# Patient Record
Sex: MALE | Race: BLACK OR AFRICAN AMERICAN | NOT HISPANIC OR LATINO | Employment: OTHER | URBAN - METROPOLITAN AREA
[De-identification: names, ages, dates, MRNs, and addresses within clinical notes are randomized per-mention and may not be internally consistent; named-entity substitution may affect disease eponyms.]

---

## 2018-01-01 ENCOUNTER — HOSPITAL ENCOUNTER (EMERGENCY)
Facility: HOSPITAL | Age: 83
Discharge: HOME | End: 2018-04-05
Attending: EMERGENCY MEDICINE
Payer: MEDICARE

## 2018-01-01 ENCOUNTER — ANESTHESIA EVENT (OUTPATIENT)
Dept: SURGERY | Facility: HOSPITAL | Age: 83
DRG: 335 | End: 2018-01-01
Payer: MEDICARE

## 2018-01-01 ENCOUNTER — PES CALL (OUTPATIENT)
Dept: ADMINISTRATIVE | Facility: CLINIC | Age: 83
End: 2018-01-01

## 2018-01-01 ENCOUNTER — HOSPITAL ENCOUNTER (INPATIENT)
Facility: HOSPITAL | Age: 83
LOS: 16 days | DRG: 335 | End: 2018-05-12
Attending: EMERGENCY MEDICINE | Admitting: INTERNAL MEDICINE
Payer: MEDICARE

## 2018-01-01 ENCOUNTER — HOSPITAL ENCOUNTER (EMERGENCY)
Facility: HOSPITAL | Age: 83
Discharge: HOME | End: 2018-04-22
Attending: EMERGENCY MEDICINE
Payer: MEDICARE

## 2018-01-01 ENCOUNTER — ANESTHESIA (OUTPATIENT)
Dept: SURGERY | Facility: HOSPITAL | Age: 83
DRG: 335 | End: 2018-01-01
Payer: MEDICARE

## 2018-01-01 VITALS
WEIGHT: 180 LBS | OXYGEN SATURATION: 98 % | TEMPERATURE: 98 F | SYSTOLIC BLOOD PRESSURE: 141 MMHG | DIASTOLIC BLOOD PRESSURE: 70 MMHG | BODY MASS INDEX: 24.38 KG/M2 | HEIGHT: 72 IN | RESPIRATION RATE: 20 BRPM | HEART RATE: 58 BPM

## 2018-01-01 VITALS
HEIGHT: 72 IN | DIASTOLIC BLOOD PRESSURE: 55 MMHG | BODY MASS INDEX: 25.06 KG/M2 | OXYGEN SATURATION: 97 % | TEMPERATURE: 98 F | WEIGHT: 185 LBS | HEART RATE: 66 BPM | SYSTOLIC BLOOD PRESSURE: 107 MMHG | RESPIRATION RATE: 18 BRPM

## 2018-01-01 VITALS
WEIGHT: 197.56 LBS | RESPIRATION RATE: 8 BRPM | SYSTOLIC BLOOD PRESSURE: 111 MMHG | DIASTOLIC BLOOD PRESSURE: 61 MMHG | HEART RATE: 26 BPM | BODY MASS INDEX: 26.76 KG/M2 | TEMPERATURE: 96 F | OXYGEN SATURATION: 58 % | HEIGHT: 72 IN

## 2018-01-01 DIAGNOSIS — K56.609 SBO (SMALL BOWEL OBSTRUCTION): ICD-10-CM

## 2018-01-01 DIAGNOSIS — R11.2 NON-INTRACTABLE VOMITING WITH NAUSEA, UNSPECIFIED VOMITING TYPE: Primary | ICD-10-CM

## 2018-01-01 DIAGNOSIS — N39.0 ACUTE UTI: Primary | ICD-10-CM

## 2018-01-01 DIAGNOSIS — K56.7 ILEUS: ICD-10-CM

## 2018-01-01 DIAGNOSIS — R07.9 CHEST PAIN: ICD-10-CM

## 2018-01-01 DIAGNOSIS — K59.00 CONSTIPATION, UNSPECIFIED CONSTIPATION TYPE: Primary | ICD-10-CM

## 2018-01-01 DIAGNOSIS — B18.2 CHRONIC HEPATITIS C WITHOUT HEPATIC COMA: ICD-10-CM

## 2018-01-01 DIAGNOSIS — I25.10 CORONARY ARTERY DISEASE, ANGINA PRESENCE UNSPECIFIED, UNSPECIFIED VESSEL OR LESION TYPE, UNSPECIFIED WHETHER NATIVE OR TRANSPLANTED HEART: ICD-10-CM

## 2018-01-01 DIAGNOSIS — R10.13 EPIGASTRIC PAIN: ICD-10-CM

## 2018-01-01 DIAGNOSIS — E44.0 MALNUTRITION OF MODERATE DEGREE: ICD-10-CM

## 2018-01-01 LAB
ALBUMIN SERPL BCP-MCNC: 1.8 G/DL
ALBUMIN SERPL BCP-MCNC: 1.9 G/DL
ALBUMIN SERPL BCP-MCNC: 1.9 G/DL
ALBUMIN SERPL BCP-MCNC: 2 G/DL
ALBUMIN SERPL BCP-MCNC: 2.1 G/DL
ALBUMIN SERPL BCP-MCNC: 2.2 G/DL
ALBUMIN SERPL BCP-MCNC: 2.4 G/DL
ALBUMIN SERPL BCP-MCNC: 2.5 G/DL
ALBUMIN SERPL BCP-MCNC: 2.8 G/DL
ALBUMIN SERPL BCP-MCNC: 2.9 G/DL
ALBUMIN SERPL BCP-MCNC: 3.1 G/DL
ALLENS TEST: ABNORMAL
ALP SERPL-CCNC: 101 U/L
ALP SERPL-CCNC: 49 U/L
ALP SERPL-CCNC: 49 U/L
ALP SERPL-CCNC: 51 U/L
ALP SERPL-CCNC: 53 U/L
ALP SERPL-CCNC: 55 U/L
ALP SERPL-CCNC: 56 U/L
ALP SERPL-CCNC: 56 U/L
ALP SERPL-CCNC: 58 U/L
ALP SERPL-CCNC: 61 U/L
ALP SERPL-CCNC: 63 U/L
ALP SERPL-CCNC: 69 U/L
ALP SERPL-CCNC: 71 U/L
ALP SERPL-CCNC: 82 U/L
ALP SERPL-CCNC: 85 U/L
ALP SERPL-CCNC: 92 U/L
ALP SERPL-CCNC: 94 U/L
ALT SERPL W/O P-5'-P-CCNC: 18 U/L
ALT SERPL W/O P-5'-P-CCNC: 20 U/L
ALT SERPL W/O P-5'-P-CCNC: 20 U/L
ALT SERPL W/O P-5'-P-CCNC: 21 U/L
ALT SERPL W/O P-5'-P-CCNC: 22 U/L
ALT SERPL W/O P-5'-P-CCNC: 22 U/L
ALT SERPL W/O P-5'-P-CCNC: 24 U/L
ALT SERPL W/O P-5'-P-CCNC: 24 U/L
ALT SERPL W/O P-5'-P-CCNC: 26 U/L
ALT SERPL W/O P-5'-P-CCNC: 27 U/L
ALT SERPL W/O P-5'-P-CCNC: 29 U/L
ALT SERPL W/O P-5'-P-CCNC: 29 U/L
ALT SERPL W/O P-5'-P-CCNC: 32 U/L
ALT SERPL W/O P-5'-P-CCNC: 34 U/L
ALT SERPL W/O P-5'-P-CCNC: 39 U/L
ALT SERPL W/O P-5'-P-CCNC: 40 U/L
ALT SERPL W/O P-5'-P-CCNC: 58 U/L
ALT SERPL W/O P-5'-P-CCNC: 66 U/L
ALT SERPL W/O P-5'-P-CCNC: 75 U/L
ANION GAP SERPL CALC-SCNC: 10 MMOL/L
ANION GAP SERPL CALC-SCNC: 10 MMOL/L
ANION GAP SERPL CALC-SCNC: 4 MMOL/L
ANION GAP SERPL CALC-SCNC: 5 MMOL/L
ANION GAP SERPL CALC-SCNC: 5 MMOL/L
ANION GAP SERPL CALC-SCNC: 6 MMOL/L
ANION GAP SERPL CALC-SCNC: 7 MMOL/L
ANION GAP SERPL CALC-SCNC: 9 MMOL/L
AST SERPL-CCNC: 27 U/L
AST SERPL-CCNC: 27 U/L
AST SERPL-CCNC: 29 U/L
AST SERPL-CCNC: 30 U/L
AST SERPL-CCNC: 31 U/L
AST SERPL-CCNC: 32 U/L
AST SERPL-CCNC: 32 U/L
AST SERPL-CCNC: 33 U/L
AST SERPL-CCNC: 34 U/L
AST SERPL-CCNC: 38 U/L
AST SERPL-CCNC: 41 U/L
AST SERPL-CCNC: 46 U/L
AST SERPL-CCNC: 48 U/L
AST SERPL-CCNC: 49 U/L
AST SERPL-CCNC: 49 U/L
AST SERPL-CCNC: 80 U/L
AST SERPL-CCNC: 82 U/L
BACTERIA #/AREA URNS HPF: ABNORMAL /HPF
BACTERIA #/AREA URNS HPF: ABNORMAL /HPF
BACTERIA BLD CULT: NORMAL
BACTERIA BLD CULT: NORMAL
BACTERIA SPEC AEROBE CULT: NORMAL
BACTERIA UR CULT: NO GROWTH
BACTERIA UR CULT: NORMAL
BACTERIA UR CULT: NORMAL
BASOPHILS # BLD AUTO: 0 K/UL
BASOPHILS # BLD AUTO: 0.1 K/UL
BASOPHILS # BLD AUTO: ABNORMAL K/UL
BASOPHILS NFR BLD: 0 %
BASOPHILS NFR BLD: 0.1 %
BASOPHILS NFR BLD: 0.2 %
BASOPHILS NFR BLD: 0.2 %
BASOPHILS NFR BLD: 0.3 %
BASOPHILS NFR BLD: 0.6 %
BASOPHILS NFR BLD: 0.6 %
BILIRUB SERPL-MCNC: 0.7 MG/DL
BILIRUB SERPL-MCNC: 0.8 MG/DL
BILIRUB SERPL-MCNC: 0.8 MG/DL
BILIRUB SERPL-MCNC: 0.9 MG/DL
BILIRUB SERPL-MCNC: 1 MG/DL
BILIRUB SERPL-MCNC: 1.1 MG/DL
BILIRUB SERPL-MCNC: 1.1 MG/DL
BILIRUB SERPL-MCNC: 1.2 MG/DL
BILIRUB SERPL-MCNC: 1.3 MG/DL
BILIRUB SERPL-MCNC: 1.6 MG/DL
BILIRUB UR QL STRIP: NEGATIVE
BNP SERPL-MCNC: 231 PG/ML
BUN SERPL-MCNC: 10 MG/DL
BUN SERPL-MCNC: 11 MG/DL
BUN SERPL-MCNC: 11 MG/DL
BUN SERPL-MCNC: 12 MG/DL
BUN SERPL-MCNC: 13 MG/DL
BUN SERPL-MCNC: 14 MG/DL
BUN SERPL-MCNC: 14 MG/DL
BUN SERPL-MCNC: 15 MG/DL
BUN SERPL-MCNC: 16 MG/DL
BUN SERPL-MCNC: 16 MG/DL
BUN SERPL-MCNC: 17 MG/DL
BUN SERPL-MCNC: 18 MG/DL
BUN SERPL-MCNC: 21 MG/DL
CALCIUM SERPL-MCNC: 7.8 MG/DL
CALCIUM SERPL-MCNC: 7.8 MG/DL
CALCIUM SERPL-MCNC: 7.9 MG/DL
CALCIUM SERPL-MCNC: 8 MG/DL
CALCIUM SERPL-MCNC: 8 MG/DL
CALCIUM SERPL-MCNC: 8.1 MG/DL
CALCIUM SERPL-MCNC: 8.2 MG/DL
CALCIUM SERPL-MCNC: 8.2 MG/DL
CALCIUM SERPL-MCNC: 8.3 MG/DL
CALCIUM SERPL-MCNC: 8.3 MG/DL
CALCIUM SERPL-MCNC: 8.4 MG/DL
CALCIUM SERPL-MCNC: 8.4 MG/DL
CALCIUM SERPL-MCNC: 8.9 MG/DL
CALCIUM SERPL-MCNC: 9 MG/DL
CALCIUM SERPL-MCNC: 9 MG/DL
CHLORIDE SERPL-SCNC: 100 MMOL/L
CHLORIDE SERPL-SCNC: 101 MMOL/L
CHLORIDE SERPL-SCNC: 102 MMOL/L
CHLORIDE SERPL-SCNC: 103 MMOL/L
CHLORIDE SERPL-SCNC: 107 MMOL/L
CHLORIDE SERPL-SCNC: 95 MMOL/L
CHLORIDE SERPL-SCNC: 95 MMOL/L
CHLORIDE SERPL-SCNC: 96 MMOL/L
CHLORIDE SERPL-SCNC: 97 MMOL/L
CHLORIDE SERPL-SCNC: 98 MMOL/L
CHLORIDE SERPL-SCNC: 98 MMOL/L
CHLORIDE SERPL-SCNC: 99 MMOL/L
CHLORIDE SERPL-SCNC: 99 MMOL/L
CLARITY UR: ABNORMAL
CLARITY UR: ABNORMAL
CLARITY UR: CLEAR
CO2 SERPL-SCNC: 21 MMOL/L
CO2 SERPL-SCNC: 21 MMOL/L
CO2 SERPL-SCNC: 23 MMOL/L
CO2 SERPL-SCNC: 24 MMOL/L
CO2 SERPL-SCNC: 25 MMOL/L
CO2 SERPL-SCNC: 25 MMOL/L
CO2 SERPL-SCNC: 26 MMOL/L
CO2 SERPL-SCNC: 27 MMOL/L
CO2 SERPL-SCNC: 27 MMOL/L
CO2 SERPL-SCNC: 28 MMOL/L
CO2 SERPL-SCNC: 29 MMOL/L
CO2 SERPL-SCNC: 30 MMOL/L
CO2 SERPL-SCNC: 32 MMOL/L
COLOR UR: YELLOW
CREAT SERPL-MCNC: 0.6 MG/DL
CREAT SERPL-MCNC: 0.7 MG/DL
CREAT SERPL-MCNC: 0.8 MG/DL
CREAT SERPL-MCNC: 0.9 MG/DL
DELSYS: ABNORMAL
DIFFERENTIAL METHOD: ABNORMAL
EOSINOPHIL # BLD AUTO: 0 K/UL
EOSINOPHIL # BLD AUTO: 0.1 K/UL
EOSINOPHIL # BLD AUTO: 0.2 K/UL
EOSINOPHIL # BLD AUTO: 0.3 K/UL
EOSINOPHIL # BLD AUTO: 0.3 K/UL
EOSINOPHIL # BLD AUTO: ABNORMAL K/UL
EOSINOPHIL NFR BLD: 0 %
EOSINOPHIL NFR BLD: 0.2 %
EOSINOPHIL NFR BLD: 0.2 %
EOSINOPHIL NFR BLD: 0.3 %
EOSINOPHIL NFR BLD: 0.4 %
EOSINOPHIL NFR BLD: 1 %
EOSINOPHIL NFR BLD: 1.8 %
EOSINOPHIL NFR BLD: 2.1 %
EOSINOPHIL NFR BLD: 2.3 %
EOSINOPHIL NFR BLD: 2.4 %
EOSINOPHIL NFR BLD: 2.9 %
EOSINOPHIL NFR BLD: 3 %
EOSINOPHIL NFR BLD: 3.2 %
EOSINOPHIL NFR BLD: 5 %
EP: 12
EP: 6
EP: 6
ERYTHROCYTE [DISTWIDTH] IN BLOOD BY AUTOMATED COUNT: 12.8 %
ERYTHROCYTE [DISTWIDTH] IN BLOOD BY AUTOMATED COUNT: 13 %
ERYTHROCYTE [DISTWIDTH] IN BLOOD BY AUTOMATED COUNT: 13 %
ERYTHROCYTE [DISTWIDTH] IN BLOOD BY AUTOMATED COUNT: 13.1 %
ERYTHROCYTE [DISTWIDTH] IN BLOOD BY AUTOMATED COUNT: 13.2 %
ERYTHROCYTE [DISTWIDTH] IN BLOOD BY AUTOMATED COUNT: 13.3 %
ERYTHROCYTE [DISTWIDTH] IN BLOOD BY AUTOMATED COUNT: 13.6 %
ERYTHROCYTE [DISTWIDTH] IN BLOOD BY AUTOMATED COUNT: 13.7 %
ERYTHROCYTE [DISTWIDTH] IN BLOOD BY AUTOMATED COUNT: 14.2 %
ERYTHROCYTE [SEDIMENTATION RATE] IN BLOOD BY WESTERGREN METHOD: 10 MM/H
ERYTHROCYTE [SEDIMENTATION RATE] IN BLOOD BY WESTERGREN METHOD: 16 MM/H
ERYTHROCYTE [SEDIMENTATION RATE] IN BLOOD BY WESTERGREN METHOD: 20 MM/H
ERYTHROCYTE [SEDIMENTATION RATE] IN BLOOD BY WESTERGREN METHOD: 20 MM/H
ERYTHROCYTE [SEDIMENTATION RATE] IN BLOOD BY WESTERGREN METHOD: 26 MM/H
ERYTHROCYTE [SEDIMENTATION RATE] IN BLOOD BY WESTERGREN METHOD: 30 MM/H
ERYTHROCYTE [SEDIMENTATION RATE] IN BLOOD BY WESTERGREN METHOD: 30 MM/H
EST. GFR  (AFRICAN AMERICAN): >60 ML/MIN/1.73 M^2
EST. GFR  (NON AFRICAN AMERICAN): >60 ML/MIN/1.73 M^2
ETCO2: 24
ETCO2: 26
ETCO2: 29
ETCO2: 32
FIO2: 0.7
FIO2: 1
FIO2: 1
FIO2: 40
FIO2: 50
FIO2: 60
FIO2: 70
FIO2: 90
FIO2: 90
FLOW: 12
GLUCOSE SERPL-MCNC: 104 MG/DL
GLUCOSE SERPL-MCNC: 110 MG/DL
GLUCOSE SERPL-MCNC: 111 MG/DL
GLUCOSE SERPL-MCNC: 113 MG/DL
GLUCOSE SERPL-MCNC: 113 MG/DL
GLUCOSE SERPL-MCNC: 114 MG/DL
GLUCOSE SERPL-MCNC: 117 MG/DL
GLUCOSE SERPL-MCNC: 119 MG/DL
GLUCOSE SERPL-MCNC: 119 MG/DL
GLUCOSE SERPL-MCNC: 121 MG/DL
GLUCOSE SERPL-MCNC: 122 MG/DL
GLUCOSE SERPL-MCNC: 124 MG/DL
GLUCOSE SERPL-MCNC: 127 MG/DL
GLUCOSE SERPL-MCNC: 129 MG/DL
GLUCOSE SERPL-MCNC: 141 MG/DL
GLUCOSE SERPL-MCNC: 84 MG/DL
GLUCOSE SERPL-MCNC: 88 MG/DL
GLUCOSE SERPL-MCNC: 91 MG/DL
GLUCOSE SERPL-MCNC: 96 MG/DL
GLUCOSE UR QL STRIP: NEGATIVE
GRAM STN SPEC: NORMAL
HCO3 UR-SCNC: 24.6 MMOL/L (ref 24–28)
HCO3 UR-SCNC: 25.6 MMOL/L (ref 24–28)
HCO3 UR-SCNC: 26.7 MMOL/L (ref 24–28)
HCO3 UR-SCNC: 27.6 MMOL/L (ref 24–28)
HCO3 UR-SCNC: 27.6 MMOL/L (ref 24–28)
HCO3 UR-SCNC: 27.7 MMOL/L (ref 24–28)
HCO3 UR-SCNC: 27.9 MMOL/L (ref 24–28)
HCO3 UR-SCNC: 28.7 MMOL/L (ref 24–28)
HCO3 UR-SCNC: 29.5 MMOL/L (ref 24–28)
HCO3 UR-SCNC: 30 MMOL/L (ref 24–28)
HCO3 UR-SCNC: 30.1 MMOL/L (ref 24–28)
HCO3 UR-SCNC: 30.2 MMOL/L (ref 24–28)
HCO3 UR-SCNC: 31.9 MMOL/L (ref 24–28)
HCO3 UR-SCNC: 32.1 MMOL/L (ref 24–28)
HCT VFR BLD AUTO: 29.4 %
HCT VFR BLD AUTO: 29.8 %
HCT VFR BLD AUTO: 30.6 %
HCT VFR BLD AUTO: 30.7 %
HCT VFR BLD AUTO: 30.9 %
HCT VFR BLD AUTO: 31.3 %
HCT VFR BLD AUTO: 32.8 %
HCT VFR BLD AUTO: 33.7 %
HCT VFR BLD AUTO: 34.1 %
HCT VFR BLD AUTO: 34.7 %
HCT VFR BLD AUTO: 34.7 %
HCT VFR BLD AUTO: 35.1 %
HCT VFR BLD AUTO: 35.6 %
HCT VFR BLD AUTO: 35.7 %
HCT VFR BLD AUTO: 37.3 %
HCT VFR BLD AUTO: 37.7 %
HCT VFR BLD AUTO: 37.9 %
HCT VFR BLD AUTO: 39.5 %
HCT VFR BLD AUTO: 42.4 %
HEPARIN INDUCED THROMBOCYTOPENIA: NORMAL
HGB BLD-MCNC: 10 G/DL
HGB BLD-MCNC: 10 G/DL
HGB BLD-MCNC: 10.1 G/DL
HGB BLD-MCNC: 10.2 G/DL
HGB BLD-MCNC: 11 G/DL
HGB BLD-MCNC: 11.2 G/DL
HGB BLD-MCNC: 11.3 G/DL
HGB BLD-MCNC: 11.5 G/DL
HGB BLD-MCNC: 11.6 G/DL
HGB BLD-MCNC: 11.7 G/DL
HGB BLD-MCNC: 11.8 G/DL
HGB BLD-MCNC: 12.1 G/DL
HGB BLD-MCNC: 12.2 G/DL
HGB BLD-MCNC: 12.6 G/DL
HGB BLD-MCNC: 12.6 G/DL
HGB BLD-MCNC: 13 G/DL
HGB BLD-MCNC: 14 G/DL
HGB BLD-MCNC: 9.8 G/DL
HGB BLD-MCNC: 9.9 G/DL
HGB UR QL STRIP: ABNORMAL
IP: 12
IP: 12
IP: 18
KETONES UR QL STRIP: ABNORMAL
KETONES UR QL STRIP: NEGATIVE
KETONES UR QL STRIP: NEGATIVE
LACTATE SERPL-SCNC: 0.9 MMOL/L
LACTATE SERPL-SCNC: 1 MMOL/L
LACTATE SERPL-SCNC: 2.5 MMOL/L
LEUKOCYTE ESTERASE UR QL STRIP: ABNORMAL
LEUKOCYTE ESTERASE UR QL STRIP: ABNORMAL
LEUKOCYTE ESTERASE UR QL STRIP: NEGATIVE
LIPASE SERPL-CCNC: 43 U/L
LIPASE SERPL-CCNC: 72 U/L
LYMPHOCYTES # BLD AUTO: 0.9 K/UL
LYMPHOCYTES # BLD AUTO: 1.1 K/UL
LYMPHOCYTES # BLD AUTO: 1.2 K/UL
LYMPHOCYTES # BLD AUTO: 1.3 K/UL
LYMPHOCYTES # BLD AUTO: 1.4 K/UL
LYMPHOCYTES # BLD AUTO: 1.6 K/UL
LYMPHOCYTES # BLD AUTO: 1.7 K/UL
LYMPHOCYTES # BLD AUTO: 1.7 K/UL
LYMPHOCYTES # BLD AUTO: 1.9 K/UL
LYMPHOCYTES # BLD AUTO: 2 K/UL
LYMPHOCYTES # BLD AUTO: 2 K/UL
LYMPHOCYTES # BLD AUTO: 2.1 K/UL
LYMPHOCYTES # BLD AUTO: 2.5 K/UL
LYMPHOCYTES # BLD AUTO: 2.8 K/UL
LYMPHOCYTES # BLD AUTO: ABNORMAL K/UL
LYMPHOCYTES NFR BLD: 10.5 %
LYMPHOCYTES NFR BLD: 11 %
LYMPHOCYTES NFR BLD: 12.8 %
LYMPHOCYTES NFR BLD: 15.3 %
LYMPHOCYTES NFR BLD: 15.5 %
LYMPHOCYTES NFR BLD: 16.4 %
LYMPHOCYTES NFR BLD: 16.8 %
LYMPHOCYTES NFR BLD: 18 %
LYMPHOCYTES NFR BLD: 18.6 %
LYMPHOCYTES NFR BLD: 21.3 %
LYMPHOCYTES NFR BLD: 32.2 %
LYMPHOCYTES NFR BLD: 38 %
LYMPHOCYTES NFR BLD: 5 %
LYMPHOCYTES NFR BLD: 5.1 %
LYMPHOCYTES NFR BLD: 5.1 %
LYMPHOCYTES NFR BLD: 5.2 %
LYMPHOCYTES NFR BLD: 6 %
LYMPHOCYTES NFR BLD: 7 %
LYMPHOCYTES NFR BLD: 9.2 %
MAGNESIUM SERPL-MCNC: 1.6 MG/DL
MAGNESIUM SERPL-MCNC: 1.7 MG/DL
MAGNESIUM SERPL-MCNC: 1.8 MG/DL
MAGNESIUM SERPL-MCNC: 1.9 MG/DL
MAGNESIUM SERPL-MCNC: 2 MG/DL
MAGNESIUM SERPL-MCNC: 2.1 MG/DL
MAP: 8
MCH RBC QN AUTO: 30.6 PG
MCH RBC QN AUTO: 30.8 PG
MCH RBC QN AUTO: 30.9 PG
MCH RBC QN AUTO: 31.1 PG
MCH RBC QN AUTO: 31.2 PG
MCH RBC QN AUTO: 31.3 PG
MCH RBC QN AUTO: 31.4 PG
MCH RBC QN AUTO: 31.4 PG
MCH RBC QN AUTO: 31.5 PG
MCH RBC QN AUTO: 31.5 PG
MCH RBC QN AUTO: 31.6 PG
MCH RBC QN AUTO: 31.7 PG
MCH RBC QN AUTO: 32 PG
MCHC RBC AUTO-ENTMCNC: 32.2 G/DL
MCHC RBC AUTO-ENTMCNC: 32.4 G/DL
MCHC RBC AUTO-ENTMCNC: 32.6 G/DL
MCHC RBC AUTO-ENTMCNC: 32.7 G/DL
MCHC RBC AUTO-ENTMCNC: 32.8 G/DL
MCHC RBC AUTO-ENTMCNC: 32.9 G/DL
MCHC RBC AUTO-ENTMCNC: 33 G/DL
MCHC RBC AUTO-ENTMCNC: 33 G/DL
MCHC RBC AUTO-ENTMCNC: 33.1 G/DL
MCHC RBC AUTO-ENTMCNC: 33.2 G/DL
MCHC RBC AUTO-ENTMCNC: 33.2 G/DL
MCHC RBC AUTO-ENTMCNC: 33.3 G/DL
MCHC RBC AUTO-ENTMCNC: 33.3 G/DL
MCHC RBC AUTO-ENTMCNC: 33.4 G/DL
MCHC RBC AUTO-ENTMCNC: 33.4 G/DL
MCHC RBC AUTO-ENTMCNC: 33.5 G/DL
MCHC RBC AUTO-ENTMCNC: 33.6 G/DL
MCHC RBC AUTO-ENTMCNC: 33.8 G/DL
MCHC RBC AUTO-ENTMCNC: 33.9 G/DL
MCV RBC AUTO: 93 FL
MCV RBC AUTO: 94 FL
MCV RBC AUTO: 94 FL
MCV RBC AUTO: 95 FL
MCV RBC AUTO: 96 FL
MCV RBC AUTO: 96 FL
METAMYELOCYTES NFR BLD MANUAL: 2 %
MICROSCOPIC COMMENT: ABNORMAL
MICROSCOPIC COMMENT: ABNORMAL
MIN VOL: 12
MIN VOL: 13
MIN VOL: 14
MIN VOL: 14
MIN VOL: 14.8
MIN VOL: 7
MIN VOL: 9.8
MODE: ABNORMAL
MONOCYTES # BLD AUTO: 0.4 K/UL
MONOCYTES # BLD AUTO: 0.5 K/UL
MONOCYTES # BLD AUTO: 0.6 K/UL
MONOCYTES # BLD AUTO: 0.6 K/UL
MONOCYTES # BLD AUTO: 0.7 K/UL
MONOCYTES # BLD AUTO: 0.7 K/UL
MONOCYTES # BLD AUTO: 0.8 K/UL
MONOCYTES # BLD AUTO: 0.8 K/UL
MONOCYTES # BLD AUTO: 0.9 K/UL
MONOCYTES # BLD AUTO: 0.9 K/UL
MONOCYTES # BLD AUTO: 1.2 K/UL
MONOCYTES # BLD AUTO: 1.2 K/UL
MONOCYTES # BLD AUTO: 1.3 K/UL
MONOCYTES # BLD AUTO: 1.4 K/UL
MONOCYTES # BLD AUTO: ABNORMAL K/UL
MONOCYTES NFR BLD: 10.2 %
MONOCYTES NFR BLD: 10.9 %
MONOCYTES NFR BLD: 2 %
MONOCYTES NFR BLD: 2.8 %
MONOCYTES NFR BLD: 3.3 %
MONOCYTES NFR BLD: 4.5 %
MONOCYTES NFR BLD: 5 %
MONOCYTES NFR BLD: 5 %
MONOCYTES NFR BLD: 5.6 %
MONOCYTES NFR BLD: 6 %
MONOCYTES NFR BLD: 6 %
MONOCYTES NFR BLD: 6.6 %
MONOCYTES NFR BLD: 6.8 %
MONOCYTES NFR BLD: 7 %
MONOCYTES NFR BLD: 7 %
MONOCYTES NFR BLD: 7.1 %
MONOCYTES NFR BLD: 9.1 %
MONOCYTES NFR BLD: 9.4 %
MONOCYTES NFR BLD: 9.4 %
NEUTROPHILS # BLD AUTO: 11.4 K/UL
NEUTROPHILS # BLD AUTO: 11.7 K/UL
NEUTROPHILS # BLD AUTO: 13.7 K/UL
NEUTROPHILS # BLD AUTO: 15.1 K/UL
NEUTROPHILS # BLD AUTO: 18.6 K/UL
NEUTROPHILS # BLD AUTO: 21.6 K/UL
NEUTROPHILS # BLD AUTO: 3.2 K/UL
NEUTROPHILS # BLD AUTO: 3.5 K/UL
NEUTROPHILS # BLD AUTO: 6.7 K/UL
NEUTROPHILS # BLD AUTO: 6.8 K/UL
NEUTROPHILS # BLD AUTO: 7.5 K/UL
NEUTROPHILS # BLD AUTO: 8.3 K/UL
NEUTROPHILS # BLD AUTO: 9.4 K/UL
NEUTROPHILS # BLD AUTO: 9.7 K/UL
NEUTROPHILS NFR BLD: 47.6 %
NEUTROPHILS NFR BLD: 55.5 %
NEUTROPHILS NFR BLD: 70.3 %
NEUTROPHILS NFR BLD: 73.2 %
NEUTROPHILS NFR BLD: 74.8 %
NEUTROPHILS NFR BLD: 75 %
NEUTROPHILS NFR BLD: 75 %
NEUTROPHILS NFR BLD: 75.2 %
NEUTROPHILS NFR BLD: 76 %
NEUTROPHILS NFR BLD: 77.7 %
NEUTROPHILS NFR BLD: 78.2 %
NEUTROPHILS NFR BLD: 79 %
NEUTROPHILS NFR BLD: 81.1 %
NEUTROPHILS NFR BLD: 82 %
NEUTROPHILS NFR BLD: 82.8 %
NEUTROPHILS NFR BLD: 87 %
NEUTROPHILS NFR BLD: 88 %
NEUTROPHILS NFR BLD: 88 %
NEUTROPHILS NFR BLD: 90.9 %
NEUTS BAND NFR BLD MANUAL: 1 %
NEUTS BAND NFR BLD MANUAL: 14 %
NEUTS BAND NFR BLD MANUAL: 3 %
NITRITE UR QL STRIP: NEGATIVE
PCO2 BLDA: 41.4 MMHG (ref 35–45)
PCO2 BLDA: 45.3 MMHG (ref 35–45)
PCO2 BLDA: 45.6 MMHG (ref 35–45)
PCO2 BLDA: 46.4 MMHG (ref 35–45)
PCO2 BLDA: 46.5 MMHG (ref 35–45)
PCO2 BLDA: 48.8 MMHG (ref 35–45)
PCO2 BLDA: 49.4 MMHG (ref 35–45)
PCO2 BLDA: 49.6 MMHG (ref 35–45)
PCO2 BLDA: 49.9 MMHG (ref 35–45)
PCO2 BLDA: 52.6 MMHG (ref 35–45)
PCO2 BLDA: 53.1 MMHG (ref 35–45)
PCO2 BLDA: 53.4 MMHG (ref 35–45)
PCO2 BLDA: 55.4 MMHG (ref 35–45)
PCO2 BLDA: 59.3 MMHG (ref 35–45)
PEEP: 10
PEEP: 12
PEEP: 12
PEEP: 15
PEEP: 5
PH SMN: 7.28 [PH] (ref 7.35–7.45)
PH SMN: 7.31 [PH] (ref 7.35–7.45)
PH SMN: 7.32 [PH] (ref 7.35–7.45)
PH SMN: 7.35 [PH] (ref 7.35–7.45)
PH SMN: 7.35 [PH] (ref 7.35–7.45)
PH SMN: 7.36 [PH] (ref 7.35–7.45)
PH SMN: 7.38 [PH] (ref 7.35–7.45)
PH SMN: 7.38 [PH] (ref 7.35–7.45)
PH SMN: 7.39 [PH] (ref 7.35–7.45)
PH SMN: 7.39 [PH] (ref 7.35–7.45)
PH SMN: 7.4 [PH] (ref 7.35–7.45)
PH SMN: 7.41 [PH] (ref 7.35–7.45)
PH SMN: 7.42 [PH] (ref 7.35–7.45)
PH SMN: 7.42 [PH] (ref 7.35–7.45)
PH UR STRIP: 7 [PH] (ref 5–8)
PH UR STRIP: 7 [PH] (ref 5–8)
PH UR STRIP: 8 [PH] (ref 5–8)
PHOSPHATE SERPL-MCNC: 2.4 MG/DL
PHOSPHATE SERPL-MCNC: 2.6 MG/DL
PHOSPHATE SERPL-MCNC: 2.7 MG/DL
PHOSPHATE SERPL-MCNC: 2.7 MG/DL
PHOSPHATE SERPL-MCNC: 2.9 MG/DL
PHOSPHATE SERPL-MCNC: 3 MG/DL
PHOSPHATE SERPL-MCNC: 3.1 MG/DL
PHOSPHATE SERPL-MCNC: 3.1 MG/DL
PHOSPHATE SERPL-MCNC: 3.2 MG/DL
PHOSPHATE SERPL-MCNC: 3.2 MG/DL
PHOSPHATE SERPL-MCNC: 3.6 MG/DL
PIP: 26
PIP: 30
PIP: 30
PIP: 32
PIP: 33
PLATELET # BLD AUTO: 105 K/UL
PLATELET # BLD AUTO: 146 K/UL
PLATELET # BLD AUTO: 16 K/UL
PLATELET # BLD AUTO: 161 K/UL
PLATELET # BLD AUTO: 165 K/UL
PLATELET # BLD AUTO: 166 K/UL
PLATELET # BLD AUTO: 169 K/UL
PLATELET # BLD AUTO: 171 K/UL
PLATELET # BLD AUTO: 173 K/UL
PLATELET # BLD AUTO: 173 K/UL
PLATELET # BLD AUTO: 175 K/UL
PLATELET # BLD AUTO: 180 K/UL
PLATELET # BLD AUTO: 186 K/UL
PLATELET # BLD AUTO: 189 K/UL
PLATELET # BLD AUTO: 190 K/UL
PLATELET # BLD AUTO: 194 K/UL
PLATELET # BLD AUTO: 195 K/UL
PLATELET # BLD AUTO: 32 K/UL
PLATELET # BLD AUTO: 40 K/UL
PLATELET BLD QL SMEAR: ABNORMAL
PMV BLD AUTO: 8.4 FL
PMV BLD AUTO: 8.4 FL
PMV BLD AUTO: 8.6 FL
PMV BLD AUTO: 8.6 FL
PMV BLD AUTO: 8.9 FL
PMV BLD AUTO: 9 FL
PMV BLD AUTO: 9.1 FL
PMV BLD AUTO: 9.3 FL
PMV BLD AUTO: 9.3 FL
PMV BLD AUTO: 9.4 FL
PMV BLD AUTO: 9.4 FL
PMV BLD AUTO: 9.5 FL
PMV BLD AUTO: 9.6 FL
PMV BLD AUTO: 9.8 FL
PMV BLD AUTO: 9.8 FL
PO2 BLDA: 107 MMHG (ref 80–100)
PO2 BLDA: 113 MMHG (ref 80–100)
PO2 BLDA: 143 MMHG (ref 80–100)
PO2 BLDA: 159 MMHG (ref 80–100)
PO2 BLDA: 171 MMHG (ref 80–100)
PO2 BLDA: 59 MMHG (ref 80–100)
PO2 BLDA: 60 MMHG (ref 80–100)
PO2 BLDA: 60 MMHG (ref 80–100)
PO2 BLDA: 67 MMHG (ref 80–100)
PO2 BLDA: 79 MMHG (ref 80–100)
PO2 BLDA: 90 MMHG (ref 80–100)
PO2 BLDA: 92 MMHG (ref 80–100)
PO2 BLDA: 95 MMHG (ref 80–100)
PO2 BLDA: 99 MMHG (ref 80–100)
POC BE: 0 MMOL/L
POC BE: 0 MMOL/L
POC BE: 1 MMOL/L
POC BE: 2 MMOL/L
POC BE: 3 MMOL/L
POC BE: 4 MMOL/L
POC BE: 4 MMOL/L
POC BE: 5 MMOL/L
POC BE: 5 MMOL/L
POC BE: 6 MMOL/L
POC BE: 7 MMOL/L
POC BE: 7 MMOL/L
POC PCO2 TEMP: 44.8 MMHG
POC PH TEMP: 7.43
POC PO2 TEMP: 147 MMHG
POC SATURATED O2: 86 % (ref 95–100)
POC SATURATED O2: 90 % (ref 95–100)
POC SATURATED O2: 91 % (ref 95–100)
POC SATURATED O2: 92 % (ref 95–100)
POC SATURATED O2: 94 % (ref 95–100)
POC SATURATED O2: 97 % (ref 95–100)
POC SATURATED O2: 98 % (ref 95–100)
POC SATURATED O2: 99 % (ref 95–100)
POC TCO2: 26 MMOL/L (ref 23–27)
POC TCO2: 27 MMOL/L (ref 23–27)
POC TCO2: 28 MMOL/L (ref 23–27)
POC TCO2: 29 MMOL/L (ref 23–27)
POC TCO2: 30 MMOL/L (ref 23–27)
POC TCO2: 31 MMOL/L (ref 23–27)
POC TCO2: 31 MMOL/L (ref 23–27)
POC TCO2: 32 MMOL/L (ref 23–27)
POC TCO2: 32 MMOL/L (ref 23–27)
POC TCO2: 33 MMOL/L (ref 23–27)
POC TCO2: 34 MMOL/L (ref 23–27)
POC TEMPERATURE: ABNORMAL
POCT GLUCOSE: 122 MG/DL (ref 70–110)
POLYCHROMASIA BLD QL SMEAR: ABNORMAL
POLYCHROMASIA BLD QL SMEAR: ABNORMAL
POTASSIUM SERPL-SCNC: 3.5 MMOL/L
POTASSIUM SERPL-SCNC: 3.7 MMOL/L
POTASSIUM SERPL-SCNC: 3.8 MMOL/L
POTASSIUM SERPL-SCNC: 3.9 MMOL/L
POTASSIUM SERPL-SCNC: 3.9 MMOL/L
POTASSIUM SERPL-SCNC: 4 MMOL/L
POTASSIUM SERPL-SCNC: 4 MMOL/L
POTASSIUM SERPL-SCNC: 4.1 MMOL/L
POTASSIUM SERPL-SCNC: 4.3 MMOL/L
POTASSIUM SERPL-SCNC: 4.4 MMOL/L
POTASSIUM SERPL-SCNC: 4.7 MMOL/L
POTASSIUM SERPL-SCNC: 4.7 MMOL/L
PROCALCITONIN SERPL IA-MCNC: 0.13 NG/ML
PROT SERPL-MCNC: 4.7 G/DL
PROT SERPL-MCNC: 4.7 G/DL
PROT SERPL-MCNC: 4.8 G/DL
PROT SERPL-MCNC: 4.9 G/DL
PROT SERPL-MCNC: 4.9 G/DL
PROT SERPL-MCNC: 5 G/DL
PROT SERPL-MCNC: 5 G/DL
PROT SERPL-MCNC: 5.1 G/DL
PROT SERPL-MCNC: 5.3 G/DL
PROT SERPL-MCNC: 5.3 G/DL
PROT SERPL-MCNC: 5.4 G/DL
PROT SERPL-MCNC: 5.5 G/DL
PROT SERPL-MCNC: 5.6 G/DL
PROT SERPL-MCNC: 5.9 G/DL
PROT SERPL-MCNC: 6.7 G/DL
PROT SERPL-MCNC: 6.8 G/DL
PROT SERPL-MCNC: 6.8 G/DL
PROT UR QL STRIP: ABNORMAL
PROT UR QL STRIP: NEGATIVE
PROT UR QL STRIP: NEGATIVE
PS: 10
RBC # BLD AUTO: 3.14 M/UL
RBC # BLD AUTO: 3.19 M/UL
RBC # BLD AUTO: 3.22 M/UL
RBC # BLD AUTO: 3.27 M/UL
RBC # BLD AUTO: 3.28 M/UL
RBC # BLD AUTO: 3.29 M/UL
RBC # BLD AUTO: 3.52 M/UL
RBC # BLD AUTO: 3.56 M/UL
RBC # BLD AUTO: 3.58 M/UL
RBC # BLD AUTO: 3.66 M/UL
RBC # BLD AUTO: 3.7 M/UL
RBC # BLD AUTO: 3.71 M/UL
RBC # BLD AUTO: 3.74 M/UL
RBC # BLD AUTO: 3.77 M/UL
RBC # BLD AUTO: 3.87 M/UL
RBC # BLD AUTO: 3.98 M/UL
RBC # BLD AUTO: 4.03 M/UL
RBC # BLD AUTO: 4.18 M/UL
RBC # BLD AUTO: 4.49 M/UL
RBC #/AREA URNS HPF: 18 /HPF (ref 0–4)
RBC #/AREA URNS HPF: >100 /HPF (ref 0–4)
SAMPLE: ABNORMAL
SITE: ABNORMAL
SODIUM SERPL-SCNC: 126 MMOL/L
SODIUM SERPL-SCNC: 127 MMOL/L
SODIUM SERPL-SCNC: 129 MMOL/L
SODIUM SERPL-SCNC: 130 MMOL/L
SODIUM SERPL-SCNC: 132 MMOL/L
SODIUM SERPL-SCNC: 133 MMOL/L
SODIUM SERPL-SCNC: 133 MMOL/L
SODIUM SERPL-SCNC: 135 MMOL/L
SODIUM SERPL-SCNC: 136 MMOL/L
SODIUM SERPL-SCNC: 137 MMOL/L
SODIUM SERPL-SCNC: 138 MMOL/L
SODIUM SERPL-SCNC: 139 MMOL/L
SP GR UR STRIP: 1.01 (ref 1–1.03)
SP GR UR STRIP: 1.01 (ref 1–1.03)
SP GR UR STRIP: 1.02 (ref 1–1.03)
SP02: 100
SP02: 92
SP02: 94
SP02: 96
SP02: 96
SP02: 97
SP02: 97
SP02: 98
SP02: 99
SP02: 99
SPONT RATE: 17
SPONT RATE: 22
SPONT RATE: 24
SPONT RATE: 28
SQUAMOUS #/AREA URNS HPF: 1 /HPF
SQUAMOUS #/AREA URNS HPF: 1 /HPF
TROPONIN I SERPL DL<=0.01 NG/ML-MCNC: <0.006 NG/ML
URN SPEC COLLECT METH UR: ABNORMAL
UROBILINOGEN UR STRIP-ACNC: 1 EU/DL
UROBILINOGEN UR STRIP-ACNC: ABNORMAL EU/DL
UROBILINOGEN UR STRIP-ACNC: NEGATIVE EU/DL
VANCOMYCIN TROUGH SERPL-MCNC: 17.1 UG/ML
VANCOMYCIN TROUGH SERPL-MCNC: 17.1 UG/ML
VOL: 431
VT: 460
VT: 490
VT: 600
WBC # BLD AUTO: 10.3 K/UL
WBC # BLD AUTO: 10.7 K/UL
WBC # BLD AUTO: 12.4 K/UL
WBC # BLD AUTO: 12.9 K/UL
WBC # BLD AUTO: 14.4 K/UL
WBC # BLD AUTO: 14.6 K/UL
WBC # BLD AUTO: 14.8 K/UL
WBC # BLD AUTO: 15.1 K/UL
WBC # BLD AUTO: 16.5 K/UL
WBC # BLD AUTO: 17.4 K/UL
WBC # BLD AUTO: 20.4 K/UL
WBC # BLD AUTO: 21.1 K/UL
WBC # BLD AUTO: 22.4 K/UL
WBC # BLD AUTO: 23.8 K/UL
WBC # BLD AUTO: 30.9 K/UL
WBC # BLD AUTO: 5.8 K/UL
WBC # BLD AUTO: 7.3 K/UL
WBC # BLD AUTO: 8.9 K/UL
WBC # BLD AUTO: 9.6 K/UL
WBC #/AREA URNS HPF: >100 /HPF (ref 0–5)
WBC #/AREA URNS HPF: >100 /HPF (ref 0–5)

## 2018-01-01 PROCEDURE — 94003 VENT MGMT INPAT SUBQ DAY: CPT

## 2018-01-01 PROCEDURE — 80053 COMPREHEN METABOLIC PANEL: CPT

## 2018-01-01 PROCEDURE — 63600175 PHARM REV CODE 636 W HCPCS: Performed by: NURSE PRACTITIONER

## 2018-01-01 PROCEDURE — 25500020 PHARM REV CODE 255

## 2018-01-01 PROCEDURE — 82803 BLOOD GASES ANY COMBINATION: CPT

## 2018-01-01 PROCEDURE — 63600175 PHARM REV CODE 636 W HCPCS: Performed by: EMERGENCY MEDICINE

## 2018-01-01 PROCEDURE — 94770 HC EXHALED C02 TEST: CPT

## 2018-01-01 PROCEDURE — 37799 UNLISTED PX VASCULAR SURGERY: CPT

## 2018-01-01 PROCEDURE — 25000003 PHARM REV CODE 250: Performed by: INTERNAL MEDICINE

## 2018-01-01 PROCEDURE — 99900035 HC TECH TIME PER 15 MIN (STAT)

## 2018-01-01 PROCEDURE — 99285 EMERGENCY DEPT VISIT HI MDM: CPT | Mod: 25

## 2018-01-01 PROCEDURE — 37000009 HC ANESTHESIA EA ADD 15 MINS: Performed by: SURGERY

## 2018-01-01 PROCEDURE — C9113 INJ PANTOPRAZOLE SODIUM, VIA: HCPCS | Performed by: INTERNAL MEDICINE

## 2018-01-01 PROCEDURE — 97162 PT EVAL MOD COMPLEX 30 MIN: CPT

## 2018-01-01 PROCEDURE — 80053 COMPREHEN METABOLIC PANEL: CPT | Mod: 91

## 2018-01-01 PROCEDURE — 25000003 PHARM REV CODE 250: Performed by: SURGERY

## 2018-01-01 PROCEDURE — 25000003 PHARM REV CODE 250: Performed by: NURSE PRACTITIONER

## 2018-01-01 PROCEDURE — 11000001 HC ACUTE MED/SURG PRIVATE ROOM

## 2018-01-01 PROCEDURE — 36000707: Performed by: SURGERY

## 2018-01-01 PROCEDURE — 63600175 PHARM REV CODE 636 W HCPCS: Performed by: SURGERY

## 2018-01-01 PROCEDURE — 99222 1ST HOSP IP/OBS MODERATE 55: CPT | Mod: AI,,, | Performed by: INTERNAL MEDICINE

## 2018-01-01 PROCEDURE — 27100171 HC OXYGEN HIGH FLOW UP TO 24 HOURS

## 2018-01-01 PROCEDURE — 85025 COMPLETE CBC W/AUTO DIFF WBC: CPT

## 2018-01-01 PROCEDURE — 27000221 HC OXYGEN, UP TO 24 HOURS

## 2018-01-01 PROCEDURE — 99232 SBSQ HOSP IP/OBS MODERATE 35: CPT | Mod: ,,, | Performed by: INTERNAL MEDICINE

## 2018-01-01 PROCEDURE — 83735 ASSAY OF MAGNESIUM: CPT

## 2018-01-01 PROCEDURE — 25000003 PHARM REV CODE 250: Performed by: EMERGENCY MEDICINE

## 2018-01-01 PROCEDURE — 80202 ASSAY OF VANCOMYCIN: CPT

## 2018-01-01 PROCEDURE — 93005 ELECTROCARDIOGRAM TRACING: CPT

## 2018-01-01 PROCEDURE — 94799 UNLISTED PULMONARY SVC/PX: CPT

## 2018-01-01 PROCEDURE — 63600175 PHARM REV CODE 636 W HCPCS: Performed by: INTERNAL MEDICINE

## 2018-01-01 PROCEDURE — 94640 AIRWAY INHALATION TREATMENT: CPT

## 2018-01-01 PROCEDURE — 93010 ELECTROCARDIOGRAM REPORT: CPT | Mod: ,,, | Performed by: INTERNAL MEDICINE

## 2018-01-01 PROCEDURE — 20000000 HC ICU ROOM

## 2018-01-01 PROCEDURE — 25000003 PHARM REV CODE 250: Performed by: FAMILY MEDICINE

## 2018-01-01 PROCEDURE — 0DNB4ZZ RELEASE ILEUM, PERCUTANEOUS ENDOSCOPIC APPROACH: ICD-10-PCS | Performed by: SURGERY

## 2018-01-01 PROCEDURE — 94761 N-INVAS EAR/PLS OXIMETRY MLT: CPT

## 2018-01-01 PROCEDURE — 97530 THERAPEUTIC ACTIVITIES: CPT

## 2018-01-01 PROCEDURE — 81000 URINALYSIS NONAUTO W/SCOPE: CPT

## 2018-01-01 PROCEDURE — 25000003 PHARM REV CODE 250: Performed by: ANESTHESIOLOGY

## 2018-01-01 PROCEDURE — 99233 SBSQ HOSP IP/OBS HIGH 50: CPT | Mod: ,,, | Performed by: SURGERY

## 2018-01-01 PROCEDURE — 84100 ASSAY OF PHOSPHORUS: CPT

## 2018-01-01 PROCEDURE — P9612 CATHETERIZE FOR URINE SPEC: HCPCS

## 2018-01-01 PROCEDURE — B4185 PARENTERAL SOL 10 GM LIPIDS: HCPCS | Performed by: INTERNAL MEDICINE

## 2018-01-01 PROCEDURE — 85027 COMPLETE CBC AUTOMATED: CPT

## 2018-01-01 PROCEDURE — 5A1955Z RESPIRATORY VENTILATION, GREATER THAN 96 CONSECUTIVE HOURS: ICD-10-PCS | Performed by: EMERGENCY MEDICINE

## 2018-01-01 PROCEDURE — 97110 THERAPEUTIC EXERCISES: CPT

## 2018-01-01 PROCEDURE — 99284 EMERGENCY DEPT VISIT MOD MDM: CPT | Mod: 25

## 2018-01-01 PROCEDURE — D9220A PRA ANESTHESIA: Mod: CRNA,,, | Performed by: NURSE ANESTHETIST, CERTIFIED REGISTERED

## 2018-01-01 PROCEDURE — 83880 ASSAY OF NATRIURETIC PEPTIDE: CPT

## 2018-01-01 PROCEDURE — 97803 MED NUTRITION INDIV SUBSEQ: CPT

## 2018-01-01 PROCEDURE — 36415 COLL VENOUS BLD VENIPUNCTURE: CPT

## 2018-01-01 PROCEDURE — 86022 PLATELET ANTIBODIES: CPT

## 2018-01-01 PROCEDURE — 85007 BL SMEAR W/DIFF WBC COUNT: CPT

## 2018-01-01 PROCEDURE — 97167 OT EVAL HIGH COMPLEX 60 MIN: CPT

## 2018-01-01 PROCEDURE — 99222 1ST HOSP IP/OBS MODERATE 55: CPT | Mod: ,,, | Performed by: SURGERY

## 2018-01-01 PROCEDURE — 12000002 HC ACUTE/MED SURGE SEMI-PRIVATE ROOM

## 2018-01-01 PROCEDURE — 25000242 PHARM REV CODE 250 ALT 637 W/ HCPCS: Performed by: FAMILY MEDICINE

## 2018-01-01 PROCEDURE — 37000008 HC ANESTHESIA 1ST 15 MINUTES: Performed by: SURGERY

## 2018-01-01 PROCEDURE — 83605 ASSAY OF LACTIC ACID: CPT

## 2018-01-01 PROCEDURE — 0DNU4ZZ RELEASE OMENTUM, PERCUTANEOUS ENDOSCOPIC APPROACH: ICD-10-PCS | Performed by: SURGERY

## 2018-01-01 PROCEDURE — 83690 ASSAY OF LIPASE: CPT

## 2018-01-01 PROCEDURE — 36620 INSERTION CATHETER ARTERY: CPT

## 2018-01-01 PROCEDURE — 71000033 HC RECOVERY, INTIAL HOUR: Performed by: SURGERY

## 2018-01-01 PROCEDURE — 84484 ASSAY OF TROPONIN QUANT: CPT

## 2018-01-01 PROCEDURE — 25000003 PHARM REV CODE 250

## 2018-01-01 PROCEDURE — 94002 VENT MGMT INPAT INIT DAY: CPT

## 2018-01-01 PROCEDURE — 25500020 PHARM REV CODE 255: Performed by: INTERNAL MEDICINE

## 2018-01-01 PROCEDURE — 99233 SBSQ HOSP IP/OBS HIGH 50: CPT | Mod: ,,, | Performed by: INTERNAL MEDICINE

## 2018-01-01 PROCEDURE — 99900026 HC AIRWAY MAINTENANCE (STAT)

## 2018-01-01 PROCEDURE — 63600175 PHARM REV CODE 636 W HCPCS: Performed by: FAMILY MEDICINE

## 2018-01-01 PROCEDURE — 36000706: Performed by: SURGERY

## 2018-01-01 PROCEDURE — 36600 WITHDRAWAL OF ARTERIAL BLOOD: CPT

## 2018-01-01 PROCEDURE — G8987 SELF CARE CURRENT STATUS: HCPCS | Mod: CM

## 2018-01-01 PROCEDURE — 97802 MEDICAL NUTRITION INDIV IN: CPT

## 2018-01-01 PROCEDURE — 85347 COAGULATION TIME ACTIVATED: CPT

## 2018-01-01 PROCEDURE — 96361 HYDRATE IV INFUSION ADD-ON: CPT

## 2018-01-01 PROCEDURE — 87086 URINE CULTURE/COLONY COUNT: CPT

## 2018-01-01 PROCEDURE — 99284 EMERGENCY DEPT VISIT MOD MDM: CPT

## 2018-01-01 PROCEDURE — G0378 HOSPITAL OBSERVATION PER HR: HCPCS

## 2018-01-01 PROCEDURE — G8988 SELF CARE GOAL STATUS: HCPCS | Mod: CL

## 2018-01-01 PROCEDURE — 96365 THER/PROPH/DIAG IV INF INIT: CPT

## 2018-01-01 PROCEDURE — P9045 ALBUMIN (HUMAN), 5%, 250 ML: HCPCS | Performed by: INTERNAL MEDICINE

## 2018-01-01 PROCEDURE — S0020 INJECTION, BUPIVICAINE HYDRO: HCPCS | Performed by: SURGERY

## 2018-01-01 PROCEDURE — 36620 INSERTION CATHETER ARTERY: CPT | Mod: 59,,, | Performed by: ANESTHESIOLOGY

## 2018-01-01 PROCEDURE — 99238 HOSP IP/OBS DSCHRG MGMT 30/<: CPT | Mod: ,,, | Performed by: INTERNAL MEDICINE

## 2018-01-01 PROCEDURE — 84145 PROCALCITONIN (PCT): CPT

## 2018-01-01 PROCEDURE — 96374 THER/PROPH/DIAG INJ IV PUSH: CPT | Mod: 59

## 2018-01-01 PROCEDURE — 27000190 HC CPAP FULL FACE MASK W/VALVE

## 2018-01-01 PROCEDURE — 94660 CPAP INITIATION&MGMT: CPT

## 2018-01-01 PROCEDURE — D9220A PRA ANESTHESIA: Mod: ANES,,, | Performed by: ANESTHESIOLOGY

## 2018-01-01 PROCEDURE — 63600175 PHARM REV CODE 636 W HCPCS: Performed by: NURSE ANESTHETIST, CERTIFIED REGISTERED

## 2018-01-01 PROCEDURE — 63600175 PHARM REV CODE 636 W HCPCS: Performed by: ANESTHESIOLOGY

## 2018-01-01 PROCEDURE — 0BH18EZ INSERTION OF ENDOTRACHEAL AIRWAY INTO TRACHEA, VIA NATURAL OR ARTIFICIAL OPENING ENDOSCOPIC: ICD-10-PCS | Performed by: EMERGENCY MEDICINE

## 2018-01-01 PROCEDURE — 86023 IMMUNOGLOBULIN ASSAY: CPT

## 2018-01-01 PROCEDURE — 27201423 OPTIME MED/SURG SUP & DEVICES STERILE SUPPLY: Performed by: SURGERY

## 2018-01-01 PROCEDURE — 99291 CRITICAL CARE FIRST HOUR: CPT | Mod: ,,, | Performed by: INTERNAL MEDICINE

## 2018-01-01 PROCEDURE — 44180 LAP ENTEROLYSIS: CPT | Mod: 22,,, | Performed by: SURGERY

## 2018-01-01 PROCEDURE — 25000003 PHARM REV CODE 250: Performed by: NURSE ANESTHETIST, CERTIFIED REGISTERED

## 2018-01-01 PROCEDURE — 96366 THER/PROPH/DIAG IV INF ADDON: CPT

## 2018-01-01 PROCEDURE — P9047 ALBUMIN (HUMAN), 25%, 50ML: HCPCS | Performed by: INTERNAL MEDICINE

## 2018-01-01 PROCEDURE — 63600175 PHARM REV CODE 636 W HCPCS: Performed by: HOSPITALIST

## 2018-01-01 PROCEDURE — 87070 CULTURE OTHR SPECIMN AEROBIC: CPT

## 2018-01-01 PROCEDURE — 87040 BLOOD CULTURE FOR BACTERIA: CPT

## 2018-01-01 PROCEDURE — 81003 URINALYSIS AUTO W/O SCOPE: CPT

## 2018-01-01 PROCEDURE — 96376 TX/PRO/DX INJ SAME DRUG ADON: CPT

## 2018-01-01 PROCEDURE — 71000039 HC RECOVERY, EACH ADD'L HOUR: Performed by: SURGERY

## 2018-01-01 PROCEDURE — 63600175 PHARM REV CODE 636 W HCPCS

## 2018-01-01 PROCEDURE — 87205 SMEAR GRAM STAIN: CPT

## 2018-01-01 RX ORDER — ALBUTEROL SULFATE 2.5 MG/.5ML
SOLUTION RESPIRATORY (INHALATION)
Status: DISPENSED
Start: 2018-01-01 | End: 2018-01-01

## 2018-01-01 RX ORDER — CIPROFLOXACIN 2 MG/ML
400 INJECTION, SOLUTION INTRAVENOUS
Status: DISCONTINUED | OUTPATIENT
Start: 2018-01-01 | End: 2018-01-01 | Stop reason: HOSPADM

## 2018-01-01 RX ORDER — PROPOFOL 10 MG/ML
INJECTION, EMULSION INTRAVENOUS
Status: DISPENSED
Start: 2018-01-01 | End: 2018-01-01

## 2018-01-01 RX ORDER — SODIUM CHLORIDE 9 MG/ML
INJECTION, SOLUTION INTRAVENOUS CONTINUOUS
Status: DISCONTINUED | OUTPATIENT
Start: 2018-01-01 | End: 2018-01-01

## 2018-01-01 RX ORDER — PANTOPRAZOLE SODIUM 40 MG/10ML
40 INJECTION, POWDER, LYOPHILIZED, FOR SOLUTION INTRAVENOUS 2 TIMES DAILY
Status: DISCONTINUED | OUTPATIENT
Start: 2018-01-01 | End: 2018-01-01 | Stop reason: HOSPADM

## 2018-01-01 RX ORDER — TRIAMTERENE AND HYDROCHLOROTHIAZIDE 37.5; 25 MG/1; MG/1
1 CAPSULE ORAL EVERY MORNING
COMMUNITY

## 2018-01-01 RX ORDER — MAG HYDROX/ALUMINUM HYD/SIMETH 200-200-20
30 SUSPENSION, ORAL (FINAL DOSE FORM) ORAL EVERY 6 HOURS PRN
Status: DISCONTINUED | OUTPATIENT
Start: 2018-01-01 | End: 2018-01-01 | Stop reason: HOSPADM

## 2018-01-01 RX ORDER — LIDOCAINE HCL/PF 100 MG/5ML
SYRINGE (ML) INTRAVENOUS
Status: DISCONTINUED | OUTPATIENT
Start: 2018-01-01 | End: 2018-01-01

## 2018-01-01 RX ORDER — DOCUSATE SODIUM 100 MG/1
100 CAPSULE, LIQUID FILLED ORAL 2 TIMES DAILY
Status: DISCONTINUED | OUTPATIENT
Start: 2018-01-01 | End: 2018-01-01 | Stop reason: HOSPADM

## 2018-01-01 RX ORDER — CEFTRIAXONE 1 G/50ML
1 INJECTION, SOLUTION INTRAVENOUS
Status: COMPLETED | OUTPATIENT
Start: 2018-01-01 | End: 2018-01-01

## 2018-01-01 RX ORDER — PROPOFOL 10 MG/ML
10 INJECTION, EMULSION INTRAVENOUS CONTINUOUS
Status: DISCONTINUED | OUTPATIENT
Start: 2018-01-01 | End: 2018-01-01

## 2018-01-01 RX ORDER — ALBUMIN HUMAN 50 G/1000ML
25 SOLUTION INTRAVENOUS ONCE
Status: COMPLETED | OUTPATIENT
Start: 2018-01-01 | End: 2018-01-01

## 2018-01-01 RX ORDER — SODIUM CHLORIDE 0.9 % (FLUSH) 0.9 %
3 SYRINGE (ML) INJECTION
Status: DISCONTINUED | OUTPATIENT
Start: 2018-01-01 | End: 2018-01-01 | Stop reason: HOSPADM

## 2018-01-01 RX ORDER — ALBUMIN HUMAN 250 G/1000ML
50 SOLUTION INTRAVENOUS ONCE
Status: COMPLETED | OUTPATIENT
Start: 2018-01-01 | End: 2018-01-01

## 2018-01-01 RX ORDER — LANOLIN ALCOHOL/MO/W.PET/CERES
1000 CREAM (GRAM) TOPICAL DAILY
Status: DISCONTINUED | OUTPATIENT
Start: 2018-01-01 | End: 2018-01-01 | Stop reason: HOSPADM

## 2018-01-01 RX ORDER — POLYETHYLENE GLYCOL 3350 17 G/17G
17 POWDER, FOR SOLUTION ORAL 2 TIMES DAILY
Status: DISCONTINUED | OUTPATIENT
Start: 2018-01-01 | End: 2018-01-01 | Stop reason: HOSPADM

## 2018-01-01 RX ORDER — SODIUM CHLORIDE 9 MG/ML
INJECTION, SOLUTION INTRAVENOUS
Status: COMPLETED
Start: 2018-01-01 | End: 2018-01-01

## 2018-01-01 RX ORDER — HYDROMORPHONE HYDROCHLORIDE 2 MG/ML
0.1 INJECTION, SOLUTION INTRAMUSCULAR; INTRAVENOUS; SUBCUTANEOUS EVERY 5 MIN PRN
Status: DISCONTINUED | OUTPATIENT
Start: 2018-01-01 | End: 2018-01-01

## 2018-01-01 RX ORDER — SYRING-NEEDL,DISP,INSUL,0.3 ML 29 G X1/2"
296 SYRINGE, EMPTY DISPOSABLE MISCELLANEOUS
Status: COMPLETED | OUTPATIENT
Start: 2018-01-01 | End: 2018-01-01

## 2018-01-01 RX ORDER — FUROSEMIDE 10 MG/ML
20 INJECTION INTRAMUSCULAR; INTRAVENOUS ONCE
Status: COMPLETED | OUTPATIENT
Start: 2018-01-01 | End: 2018-01-01

## 2018-01-01 RX ORDER — CEPHALEXIN 500 MG/1
500 CAPSULE ORAL 4 TIMES DAILY
Qty: 24 CAPSULE | Refills: 0 | Status: SHIPPED | OUTPATIENT
Start: 2018-01-01 | End: 2018-01-01

## 2018-01-01 RX ORDER — SYRING-NEEDL,DISP,INSUL,0.3 ML 29 G X1/2"
296 SYRINGE, EMPTY DISPOSABLE MISCELLANEOUS ONCE
Status: COMPLETED | OUTPATIENT
Start: 2018-01-01 | End: 2018-01-01

## 2018-01-01 RX ORDER — BISACODYL 10 MG
10 SUPPOSITORY, RECTAL RECTAL DAILY PRN
Status: DISCONTINUED | OUTPATIENT
Start: 2018-01-01 | End: 2018-01-01 | Stop reason: HOSPADM

## 2018-01-01 RX ORDER — ONDANSETRON 2 MG/ML
INJECTION INTRAMUSCULAR; INTRAVENOUS
Status: DISCONTINUED | OUTPATIENT
Start: 2018-01-01 | End: 2018-01-01

## 2018-01-01 RX ORDER — FENTANYL CITRATE 50 UG/ML
INJECTION, SOLUTION INTRAMUSCULAR; INTRAVENOUS
Status: DISCONTINUED | OUTPATIENT
Start: 2018-01-01 | End: 2018-01-01

## 2018-01-01 RX ORDER — MAGNESIUM SULFATE HEPTAHYDRATE 40 MG/ML
2 INJECTION, SOLUTION INTRAVENOUS ONCE
Status: COMPLETED | OUTPATIENT
Start: 2018-01-01 | End: 2018-01-01

## 2018-01-01 RX ORDER — CEPHALEXIN 500 MG/1
500 CAPSULE ORAL 4 TIMES DAILY
Status: DISCONTINUED | OUTPATIENT
Start: 2018-01-01 | End: 2018-01-01

## 2018-01-01 RX ORDER — ENOXAPARIN SODIUM 100 MG/ML
40 INJECTION SUBCUTANEOUS
Status: DISCONTINUED | OUTPATIENT
Start: 2018-01-01 | End: 2018-01-01

## 2018-01-01 RX ORDER — FUROSEMIDE 10 MG/ML
INJECTION INTRAMUSCULAR; INTRAVENOUS
Status: DISCONTINUED
Start: 2018-01-01 | End: 2018-01-01 | Stop reason: HOSPADM

## 2018-01-01 RX ORDER — PROPOFOL 10 MG/ML
50 INJECTION, EMULSION INTRAVENOUS CONTINUOUS
Status: DISCONTINUED | OUTPATIENT
Start: 2018-01-01 | End: 2018-01-01

## 2018-01-01 RX ORDER — ROCURONIUM BROMIDE 10 MG/ML
INJECTION, SOLUTION INTRAVENOUS
Status: DISCONTINUED | OUTPATIENT
Start: 2018-01-01 | End: 2018-01-01

## 2018-01-01 RX ORDER — FUROSEMIDE 10 MG/ML
20 INJECTION INTRAMUSCULAR; INTRAVENOUS EVERY 8 HOURS
Status: DISCONTINUED | OUTPATIENT
Start: 2018-01-01 | End: 2018-01-01 | Stop reason: HOSPADM

## 2018-01-01 RX ORDER — ALBUTEROL SULFATE 5 MG/ML
2.5 SOLUTION RESPIRATORY (INHALATION)
Status: DISCONTINUED | OUTPATIENT
Start: 2018-01-01 | End: 2018-01-01

## 2018-01-01 RX ORDER — RAMELTEON 8 MG/1
8 TABLET ORAL NIGHTLY PRN
Status: DISCONTINUED | OUTPATIENT
Start: 2018-01-01 | End: 2018-01-01 | Stop reason: HOSPADM

## 2018-01-01 RX ORDER — ONDANSETRON 2 MG/ML
4 INJECTION INTRAMUSCULAR; INTRAVENOUS EVERY 8 HOURS PRN
Status: DISCONTINUED | OUTPATIENT
Start: 2018-01-01 | End: 2018-01-01

## 2018-01-01 RX ORDER — FUROSEMIDE 10 MG/ML
40 INJECTION INTRAMUSCULAR; INTRAVENOUS ONCE
Status: COMPLETED | OUTPATIENT
Start: 2018-01-01 | End: 2018-01-01

## 2018-01-01 RX ORDER — NOREPINEPHRINE BITARTRATE/D5W 8 MG/250ML
0.02 PLASTIC BAG, INJECTION (ML) INTRAVENOUS CONTINUOUS
Status: DISCONTINUED | OUTPATIENT
Start: 2018-01-01 | End: 2018-01-01

## 2018-01-01 RX ORDER — SODIUM CHLORIDE 9 MG/ML
INJECTION, SOLUTION INTRAVENOUS
Status: DISPENSED
Start: 2018-01-01 | End: 2018-01-01

## 2018-01-01 RX ORDER — POLYETHYLENE GLYCOL 3350 17 G/17G
17 POWDER, FOR SOLUTION ORAL 2 TIMES DAILY PRN
Status: DISCONTINUED | OUTPATIENT
Start: 2018-01-01 | End: 2018-01-01

## 2018-01-01 RX ORDER — ONDANSETRON 2 MG/ML
4 INJECTION INTRAMUSCULAR; INTRAVENOUS DAILY PRN
Status: DISCONTINUED | OUTPATIENT
Start: 2018-01-01 | End: 2018-01-01

## 2018-01-01 RX ORDER — ONDANSETRON 2 MG/ML
4 INJECTION INTRAMUSCULAR; INTRAVENOUS EVERY 6 HOURS PRN
Status: DISCONTINUED | OUTPATIENT
Start: 2018-01-01 | End: 2018-01-01 | Stop reason: HOSPADM

## 2018-01-01 RX ORDER — METOCLOPRAMIDE HYDROCHLORIDE 5 MG/ML
10 INJECTION INTRAMUSCULAR; INTRAVENOUS EVERY 6 HOURS
Status: DISCONTINUED | OUTPATIENT
Start: 2018-01-01 | End: 2018-01-01 | Stop reason: HOSPADM

## 2018-01-01 RX ORDER — ETOMIDATE 2 MG/ML
INJECTION INTRAVENOUS
Status: DISCONTINUED | OUTPATIENT
Start: 2018-01-01 | End: 2018-01-01

## 2018-01-01 RX ORDER — PHENYLEPHRINE HYDROCHLORIDE 10 MG/ML
INJECTION INTRAVENOUS
Status: DISCONTINUED | OUTPATIENT
Start: 2018-01-01 | End: 2018-01-01

## 2018-01-01 RX ORDER — FOLIC ACID 1 MG/1
1 TABLET ORAL DAILY
Status: DISCONTINUED | OUTPATIENT
Start: 2018-01-01 | End: 2018-01-01 | Stop reason: HOSPADM

## 2018-01-01 RX ORDER — TRIAMTERENE AND HYDROCHLOROTHIAZIDE 37.5; 25 MG/1; MG/1
1 CAPSULE ORAL EVERY MORNING
Status: DISCONTINUED | OUTPATIENT
Start: 2018-01-01 | End: 2018-01-01

## 2018-01-01 RX ORDER — TAMSULOSIN HYDROCHLORIDE 0.4 MG/1
0.4 CAPSULE ORAL DAILY
Status: DISCONTINUED | OUTPATIENT
Start: 2018-01-01 | End: 2018-01-01 | Stop reason: HOSPADM

## 2018-01-01 RX ORDER — PHYTONADIONE 5 MG/1
10 TABLET ORAL DAILY
Status: DISCONTINUED | OUTPATIENT
Start: 2018-01-01 | End: 2018-01-01 | Stop reason: HOSPADM

## 2018-01-01 RX ORDER — DEXAMETHASONE SODIUM PHOSPHATE 4 MG/ML
INJECTION, SOLUTION INTRA-ARTICULAR; INTRALESIONAL; INTRAMUSCULAR; INTRAVENOUS; SOFT TISSUE
Status: DISCONTINUED | OUTPATIENT
Start: 2018-01-01 | End: 2018-01-01

## 2018-01-01 RX ORDER — EPHEDRINE SULFATE 50 MG/ML
INJECTION, SOLUTION INTRAVENOUS
Status: DISCONTINUED | OUTPATIENT
Start: 2018-01-01 | End: 2018-01-01

## 2018-01-01 RX ORDER — ENOXAPARIN SODIUM 100 MG/ML
40 INJECTION SUBCUTANEOUS EVERY 24 HOURS
Status: DISCONTINUED | OUTPATIENT
Start: 2018-01-01 | End: 2018-01-01

## 2018-01-01 RX ORDER — SODIUM CHLORIDE, SODIUM LACTATE, POTASSIUM CHLORIDE, CALCIUM CHLORIDE 600; 310; 30; 20 MG/100ML; MG/100ML; MG/100ML; MG/100ML
INJECTION, SOLUTION INTRAVENOUS CONTINUOUS PRN
Status: DISCONTINUED | OUTPATIENT
Start: 2018-01-01 | End: 2018-01-01

## 2018-01-01 RX ORDER — RAMELTEON 8 MG/1
TABLET ORAL
Status: DISPENSED
Start: 2018-01-01 | End: 2018-01-01

## 2018-01-01 RX ORDER — GLYCOPYRROLATE 0.2 MG/ML
INJECTION INTRAMUSCULAR; INTRAVENOUS
Status: DISCONTINUED | OUTPATIENT
Start: 2018-01-01 | End: 2018-01-01

## 2018-01-01 RX ORDER — BUPIVACAINE HYDROCHLORIDE 5 MG/ML
INJECTION, SOLUTION EPIDURAL; INTRACAUDAL
Status: DISCONTINUED | OUTPATIENT
Start: 2018-01-01 | End: 2018-01-01 | Stop reason: HOSPADM

## 2018-01-01 RX ORDER — NEOSTIGMINE METHYLSULFATE 1 MG/ML
INJECTION, SOLUTION INTRAVENOUS
Status: DISCONTINUED | OUTPATIENT
Start: 2018-01-01 | End: 2018-01-01

## 2018-01-01 RX ORDER — HYDROMORPHONE HYDROCHLORIDE 2 MG/ML
0.5 INJECTION, SOLUTION INTRAMUSCULAR; INTRAVENOUS; SUBCUTANEOUS EVERY 4 HOURS PRN
Status: DISCONTINUED | OUTPATIENT
Start: 2018-01-01 | End: 2018-01-01 | Stop reason: HOSPADM

## 2018-01-01 RX ADMIN — METOCLOPRAMIDE 10 MG: 5 INJECTION, SOLUTION INTRAMUSCULAR; INTRAVENOUS at 06:05

## 2018-01-01 RX ADMIN — DOCUSATE SODIUM 100 MG: 100 CAPSULE, LIQUID FILLED ORAL at 09:05

## 2018-01-01 RX ADMIN — FUROSEMIDE 20 MG: 10 INJECTION, SOLUTION INTRAMUSCULAR; INTRAVENOUS at 07:05

## 2018-01-01 RX ADMIN — PIPERACILLIN SODIUM AND TAZOBACTAM SODIUM 3.38 G: 3; .375 INJECTION, POWDER, LYOPHILIZED, FOR SOLUTION INTRAVENOUS at 03:05

## 2018-01-01 RX ADMIN — ENOXAPARIN SODIUM 40 MG: 100 INJECTION SUBCUTANEOUS at 06:04

## 2018-01-01 RX ADMIN — SODIUM CHLORIDE, SODIUM LACTATE, POTASSIUM CHLORIDE, AND CALCIUM CHLORIDE 1000 ML: .6; .31; .03; .02 INJECTION, SOLUTION INTRAVENOUS at 01:04

## 2018-01-01 RX ADMIN — PIPERACILLIN SODIUM AND TAZOBACTAM SODIUM 3.38 G: 3; .375 INJECTION, POWDER, LYOPHILIZED, FOR SOLUTION INTRAVENOUS at 10:05

## 2018-01-01 RX ADMIN — POLYETHYLENE GLYCOL (3350) 17 G: 17 POWDER, FOR SOLUTION ORAL at 09:05

## 2018-01-01 RX ADMIN — DOCUSATE SODIUM 100 MG: 100 CAPSULE, LIQUID FILLED ORAL at 08:04

## 2018-01-01 RX ADMIN — ALBUTEROL SULFATE 2.5 MG: 2.5 SOLUTION RESPIRATORY (INHALATION) at 07:05

## 2018-01-01 RX ADMIN — FUROSEMIDE 40 MG: 10 INJECTION, SOLUTION INTRAMUSCULAR; INTRAVENOUS at 10:05

## 2018-01-01 RX ADMIN — CIPROFLOXACIN 400 MG: 2 INJECTION, SOLUTION INTRAVENOUS at 12:05

## 2018-01-01 RX ADMIN — METOCLOPRAMIDE 10 MG: 5 INJECTION, SOLUTION INTRAMUSCULAR; INTRAVENOUS at 11:05

## 2018-01-01 RX ADMIN — PIPERACILLIN SODIUM AND TAZOBACTAM SODIUM 3.38 G: 3; .375 INJECTION, POWDER, LYOPHILIZED, FOR SOLUTION INTRAVENOUS at 09:05

## 2018-01-01 RX ADMIN — ONDANSETRON 4 MG: 2 INJECTION, SOLUTION INTRAMUSCULAR; INTRAVENOUS at 02:04

## 2018-01-01 RX ADMIN — PANTOPRAZOLE SODIUM 40 MG: 40 INJECTION, POWDER, LYOPHILIZED, FOR SOLUTION INTRAVENOUS at 09:05

## 2018-01-01 RX ADMIN — MIDAZOLAM 1 MG/HR: 5 INJECTION INTRAMUSCULAR; INTRAVENOUS at 07:05

## 2018-01-01 RX ADMIN — FUROSEMIDE 40 MG: 10 INJECTION, SOLUTION INTRAMUSCULAR; INTRAVENOUS at 08:05

## 2018-01-01 RX ADMIN — METOCLOPRAMIDE 10 MG: 5 INJECTION, SOLUTION INTRAMUSCULAR; INTRAVENOUS at 05:05

## 2018-01-01 RX ADMIN — TAMSULOSIN HYDROCHLORIDE 0.4 MG: 0.4 CAPSULE ORAL at 11:05

## 2018-01-01 RX ADMIN — GLYCOPYRROLATE 0.5 MG: 0.2 INJECTION, SOLUTION INTRAMUSCULAR; INTRAVENOUS at 04:04

## 2018-01-01 RX ADMIN — LEUCINE, PHENYLALANINE, LYSINE, METHIONINE, ISOLEUCINE, VALINE, HISTIDINE, THREONINE, TRYPTOPHAN, ALANINE, GLYCINE, ARGININE, PROLINE, SERINE, TYROSINE, SODIUM ACETATE, DIBASIC POTASSIUM PHOSPHATE, MAGNESIUM CHLORIDE, SODIUM CHLORIDE, CALCIUM CHLORIDE, DEXTROSE
201; 154; 159; 110; 165; 160; 132; 116; 50; 570; 283; 316; 187; 138; 11; 217; 261; 51; 112; 33; 5 INJECTION INTRAVENOUS at 10:05

## 2018-01-01 RX ADMIN — PIPERACILLIN SODIUM AND TAZOBACTAM SODIUM 3.38 G: 3; .375 INJECTION, POWDER, LYOPHILIZED, FOR SOLUTION INTRAVENOUS at 05:05

## 2018-01-01 RX ADMIN — PANTOPRAZOLE SODIUM 40 MG: 40 INJECTION, POWDER, LYOPHILIZED, FOR SOLUTION INTRAVENOUS at 08:05

## 2018-01-01 RX ADMIN — ALBUTEROL SULFATE 2.5 MG: 2.5 SOLUTION RESPIRATORY (INHALATION) at 10:05

## 2018-01-01 RX ADMIN — LEUCINE, PHENYLALANINE, LYSINE, METHIONINE, ISOLEUCINE, VALINE, HISTIDINE, THREONINE, TRYPTOPHAN, ALANINE, GLYCINE, ARGININE, PROLINE, SERINE, TYROSINE, SODIUM ACETATE, DIBASIC POTASSIUM PHOSPHATE, MAGNESIUM CHLORIDE, SODIUM CHLORIDE, CALCIUM CHLORIDE, DEXTROSE
365; 280; 290; 200; 300; 290; 240; 210; 90; 1035; 515; 575; 340; 250; 20; 340; 261; 51; 59; 33; 15 INJECTION INTRAVENOUS at 09:05

## 2018-01-01 RX ADMIN — ALBUMIN HUMAN 25 G: 0.05 INJECTION, SOLUTION INTRAVENOUS at 10:05

## 2018-01-01 RX ADMIN — SODIUM CHLORIDE 1500 MG: 9 INJECTION, SOLUTION INTRAVENOUS at 10:05

## 2018-01-01 RX ADMIN — PIPERACILLIN SODIUM AND TAZOBACTAM SODIUM 3.38 G: 3; .375 INJECTION, POWDER, LYOPHILIZED, FOR SOLUTION INTRAVENOUS at 04:05

## 2018-01-01 RX ADMIN — TAMSULOSIN HYDROCHLORIDE 0.4 MG: 0.4 CAPSULE ORAL at 10:05

## 2018-01-01 RX ADMIN — TAMSULOSIN HYDROCHLORIDE 0.4 MG: 0.4 CAPSULE ORAL at 08:04

## 2018-01-01 RX ADMIN — CALCIUM CHLORIDE 250 MG: 100 INJECTION, SOLUTION INTRAVENOUS at 02:04

## 2018-01-01 RX ADMIN — METOCLOPRAMIDE 10 MG: 5 INJECTION, SOLUTION INTRAMUSCULAR; INTRAVENOUS at 12:05

## 2018-01-01 RX ADMIN — CEFTRIAXONE 1 G: 1 INJECTION, SOLUTION INTRAVENOUS at 10:04

## 2018-01-01 RX ADMIN — ROCURONIUM BROMIDE 5 MG: 10 INJECTION, SOLUTION INTRAVENOUS at 04:04

## 2018-01-01 RX ADMIN — TAMSULOSIN HYDROCHLORIDE 0.4 MG: 0.4 CAPSULE ORAL at 09:05

## 2018-01-01 RX ADMIN — IOHEXOL 75 ML: 350 INJECTION, SOLUTION INTRAVENOUS at 01:04

## 2018-01-01 RX ADMIN — CIPROFLOXACIN 400 MG: 2 INJECTION, SOLUTION INTRAVENOUS at 11:05

## 2018-01-01 RX ADMIN — SODIUM PHOSPHATE, MONOBASIC, MONOHYDRATE 30 MMOL: 276; 142 INJECTION, SOLUTION INTRAVENOUS at 11:05

## 2018-01-01 RX ADMIN — PIPERACILLIN SODIUM AND TAZOBACTAM SODIUM 3.38 G: 3; .375 INJECTION, POWDER, LYOPHILIZED, FOR SOLUTION INTRAVENOUS at 08:04

## 2018-01-01 RX ADMIN — ROCURONIUM BROMIDE 50 MG: 10 INJECTION, SOLUTION INTRAVENOUS at 02:04

## 2018-01-01 RX ADMIN — PIPERACILLIN SODIUM AND TAZOBACTAM SODIUM 3.38 G: 3; .375 INJECTION, POWDER, LYOPHILIZED, FOR SOLUTION INTRAVENOUS at 11:05

## 2018-01-01 RX ADMIN — LEUCINE, PHENYLALANINE, LYSINE, METHIONINE, ISOLEUCINE, VALINE, HISTIDINE, THREONINE, TRYPTOPHAN, ALANINE, GLYCINE, ARGININE, PROLINE, SERINE, TYROSINE, SODIUM ACETATE, DIBASIC POTASSIUM PHOSPHATE, MAGNESIUM CHLORIDE, SODIUM CHLORIDE, CALCIUM CHLORIDE, DEXTROSE
201; 154; 159; 110; 165; 160; 132; 116; 50; 570; 283; 316; 187; 138; 11; 217; 261; 51; 112; 33; 5 INJECTION INTRAVENOUS at 09:05

## 2018-01-01 RX ADMIN — LEUCINE, PHENYLALANINE, LYSINE, METHIONINE, ISOLEUCINE, VALINE, HISTIDINE, THREONINE, TRYPTOPHAN, ALANINE, GLYCINE, ARGININE, PROLINE, SERINE, TYROSINE, SODIUM ACETATE, DIBASIC POTASSIUM PHOSPHATE, MAGNESIUM CHLORIDE, SODIUM CHLORIDE, CALCIUM CHLORIDE, DEXTROSE
201; 154; 159; 110; 165; 160; 132; 116; 50; 570; 283; 316; 187; 138; 11; 217; 261; 51; 112; 33; 5 INJECTION INTRAVENOUS at 04:05

## 2018-01-01 RX ADMIN — DOCUSATE SODIUM 100 MG: 100 CAPSULE, LIQUID FILLED ORAL at 10:05

## 2018-01-01 RX ADMIN — ONDANSETRON 4 MG: 2 INJECTION INTRAMUSCULAR; INTRAVENOUS at 07:05

## 2018-01-01 RX ADMIN — IOHEXOL 100 ML: 350 INJECTION, SOLUTION INTRAVENOUS at 12:05

## 2018-01-01 RX ADMIN — I.V. FAT EMULSION 250 ML: 20 EMULSION INTRAVENOUS at 09:05

## 2018-01-01 RX ADMIN — CIPROFLOXACIN 400 MG: 2 INJECTION, SOLUTION INTRAVENOUS at 04:04

## 2018-01-01 RX ADMIN — ENOXAPARIN SODIUM 40 MG: 100 INJECTION SUBCUTANEOUS at 05:05

## 2018-01-01 RX ADMIN — ENOXAPARIN SODIUM 40 MG: 100 INJECTION SUBCUTANEOUS at 05:04

## 2018-01-01 RX ADMIN — PIPERACILLIN SODIUM AND TAZOBACTAM SODIUM 3.38 G: 3; .375 INJECTION, POWDER, LYOPHILIZED, FOR SOLUTION INTRAVENOUS at 06:05

## 2018-01-01 RX ADMIN — HYDROMORPHONE HYDROCHLORIDE 0.5 MG: 2 INJECTION INTRAMUSCULAR; INTRAVENOUS; SUBCUTANEOUS at 05:05

## 2018-01-01 RX ADMIN — POLYETHYLENE GLYCOL (3350) 17 G: 17 POWDER, FOR SOLUTION ORAL at 05:04

## 2018-01-01 RX ADMIN — METOCLOPRAMIDE 10 MG: 5 INJECTION, SOLUTION INTRAMUSCULAR; INTRAVENOUS at 07:05

## 2018-01-01 RX ADMIN — FENTANYL CITRATE 100 MCG/HR: 50 INJECTION INTRAVENOUS at 10:05

## 2018-01-01 RX ADMIN — CIPROFLOXACIN 400 MG: 2 INJECTION, SOLUTION INTRAVENOUS at 05:04

## 2018-01-01 RX ADMIN — PIPERACILLIN SODIUM AND TAZOBACTAM SODIUM 3.38 G: 3; .375 INJECTION, POWDER, LYOPHILIZED, FOR SOLUTION INTRAVENOUS at 02:04

## 2018-01-01 RX ADMIN — FENTANYL CITRATE 50 MCG/HR: 50 INJECTION INTRAVENOUS at 03:05

## 2018-01-01 RX ADMIN — HYDROMORPHONE HYDROCHLORIDE 0.1 MG: 2 INJECTION INTRAMUSCULAR; INTRAVENOUS; SUBCUTANEOUS at 12:04

## 2018-01-01 RX ADMIN — FENTANYL CITRATE 25 MCG: 50 INJECTION, SOLUTION INTRAMUSCULAR; INTRAVENOUS at 03:04

## 2018-01-01 RX ADMIN — RAMELTEON 8 MG: 8 TABLET, FILM COATED ORAL at 08:04

## 2018-01-01 RX ADMIN — FENTANYL CITRATE 25 MCG: 50 INJECTION, SOLUTION INTRAMUSCULAR; INTRAVENOUS at 02:04

## 2018-01-01 RX ADMIN — VANCOMYCIN HYDROCHLORIDE 1750 MG: 1 INJECTION, POWDER, LYOPHILIZED, FOR SOLUTION INTRAVENOUS at 02:05

## 2018-01-01 RX ADMIN — HYDROMORPHONE HYDROCHLORIDE 0.1 MG: 2 INJECTION INTRAMUSCULAR; INTRAVENOUS; SUBCUTANEOUS at 06:04

## 2018-01-01 RX ADMIN — MIDAZOLAM 2 MG/HR: 5 INJECTION INTRAMUSCULAR; INTRAVENOUS at 05:05

## 2018-01-01 RX ADMIN — PIPERACILLIN AND TAZOBACTAM 3.38 G: 3; .375 INJECTION, POWDER, LYOPHILIZED, FOR SOLUTION INTRAVENOUS; PARENTERAL at 02:04

## 2018-01-01 RX ADMIN — PIPERACILLIN AND TAZOBACTAM 3.38 G: 3; .375 INJECTION, POWDER, LYOPHILIZED, FOR SOLUTION INTRAVENOUS; PARENTERAL at 07:04

## 2018-01-01 RX ADMIN — CIPROFLOXACIN 400 MG: 2 INJECTION, SOLUTION INTRAVENOUS at 10:05

## 2018-01-01 RX ADMIN — FUROSEMIDE 20 MG: 10 INJECTION, SOLUTION INTRAMUSCULAR; INTRAVENOUS at 05:05

## 2018-01-01 RX ADMIN — ONDANSETRON HYDROCHLORIDE 4 MG: 2 INJECTION INTRAMUSCULAR; INTRAVENOUS at 03:04

## 2018-01-01 RX ADMIN — MAGESIUM CITRATE 296 ML: 1.75 LIQUID ORAL at 03:04

## 2018-01-01 RX ADMIN — LIDOCAINE HYDROCHLORIDE 100 MG: 20 INJECTION, SOLUTION INTRAVENOUS at 02:04

## 2018-01-01 RX ADMIN — ONDANSETRON HYDROCHLORIDE 4 MG: 2 INJECTION INTRAMUSCULAR; INTRAVENOUS at 12:04

## 2018-01-01 RX ADMIN — MIDAZOLAM 2 MG/HR: 5 INJECTION INTRAMUSCULAR; INTRAVENOUS at 03:05

## 2018-01-01 RX ADMIN — SODIUM CHLORIDE, SODIUM GLUCONATE, SODIUM ACETATE, POTASSIUM CHLORIDE, MAGNESIUM CHLORIDE, SODIUM PHOSPHATE, DIBASIC, AND POTASSIUM PHOSPHATE: .53; .5; .37; .037; .03; .012; .00082 INJECTION, SOLUTION INTRAVENOUS at 04:04

## 2018-01-01 RX ADMIN — POLYETHYLENE GLYCOL (3350) 17 G: 17 POWDER, FOR SOLUTION ORAL at 10:05

## 2018-01-01 RX ADMIN — NEOSTIGMINE METHYLSULFATE 4 MG: 1 INJECTION INTRAVENOUS at 04:04

## 2018-01-01 RX ADMIN — DEXAMETHASONE SODIUM PHOSPHATE 4 MG: 4 INJECTION, SOLUTION INTRAMUSCULAR; INTRAVENOUS at 02:04

## 2018-01-01 RX ADMIN — HYDROMORPHONE HYDROCHLORIDE 0.5 MG: 2 INJECTION INTRAMUSCULAR; INTRAVENOUS; SUBCUTANEOUS at 03:05

## 2018-01-01 RX ADMIN — POLYETHYLENE GLYCOL (3350) 17 G: 17 POWDER, FOR SOLUTION ORAL at 08:05

## 2018-01-01 RX ADMIN — FOLIC ACID 1 MG: 1 TABLET ORAL at 02:05

## 2018-01-01 RX ADMIN — ENOXAPARIN SODIUM 40 MG: 100 INJECTION SUBCUTANEOUS at 04:05

## 2018-01-01 RX ADMIN — LEUCINE, PHENYLALANINE, LYSINE, METHIONINE, ISOLEUCINE, VALINE, HISTIDINE, THREONINE, TRYPTOPHAN, ALANINE, GLYCINE, ARGININE, PROLINE, SERINE, TYROSINE, SODIUM ACETATE, DIBASIC POTASSIUM PHOSPHATE, MAGNESIUM CHLORIDE, SODIUM CHLORIDE, CALCIUM CHLORIDE, DEXTROSE
201; 154; 159; 110; 165; 160; 132; 116; 50; 570; 283; 316; 187; 138; 11; 217; 261; 51; 112; 33; 5 INJECTION INTRAVENOUS at 10:04

## 2018-01-01 RX ADMIN — SODIUM CHLORIDE 1750 MG: 9 INJECTION, SOLUTION INTRAVENOUS at 08:05

## 2018-01-01 RX ADMIN — SODIUM CHLORIDE, SODIUM LACTATE, POTASSIUM CHLORIDE, AND CALCIUM CHLORIDE: .6; .31; .03; .02 INJECTION, SOLUTION INTRAVENOUS at 04:04

## 2018-01-01 RX ADMIN — FUROSEMIDE 20 MG: 10 INJECTION, SOLUTION INTRAMUSCULAR; INTRAVENOUS at 09:05

## 2018-01-01 RX ADMIN — ALUMINUM HYDROXIDE, MAGNESIUM HYDROXIDE, AND SIMETHICONE 30 ML: 200; 200; 20 SUSPENSION ORAL at 04:04

## 2018-01-01 RX ADMIN — POLYETHYLENE GLYCOL (3350) 17 G: 17 POWDER, FOR SOLUTION ORAL at 08:04

## 2018-01-01 RX ADMIN — PIPERACILLIN AND TAZOBACTAM 3.38 G: 3; .375 INJECTION, POWDER, LYOPHILIZED, FOR SOLUTION INTRAVENOUS; PARENTERAL at 08:04

## 2018-01-01 RX ADMIN — DOCUSATE SODIUM 100 MG: 100 CAPSULE, LIQUID FILLED ORAL at 08:05

## 2018-01-01 RX ADMIN — DOCUSATE SODIUM 100 MG: 100 CAPSULE, LIQUID FILLED ORAL at 11:05

## 2018-01-01 RX ADMIN — LEUCINE, PHENYLALANINE, LYSINE, METHIONINE, ISOLEUCINE, VALINE, HISTIDINE, THREONINE, TRYPTOPHAN, ALANINE, GLYCINE, ARGININE, PROLINE, SERINE, TYROSINE, SODIUM ACETATE, DIBASIC POTASSIUM PHOSPHATE, MAGNESIUM CHLORIDE, SODIUM CHLORIDE, CALCIUM CHLORIDE, DEXTROSE
201; 154; 159; 110; 165; 160; 132; 116; 50; 570; 283; 316; 187; 138; 11; 217; 261; 51; 112; 33; 5 INJECTION INTRAVENOUS at 03:05

## 2018-01-01 RX ADMIN — CIPROFLOXACIN 400 MG: 2 INJECTION, SOLUTION INTRAVENOUS at 09:04

## 2018-01-01 RX ADMIN — FENTANYL CITRATE 50 MCG/HR: 50 INJECTION INTRAVENOUS at 05:05

## 2018-01-01 RX ADMIN — PIPERACILLIN SODIUM AND TAZOBACTAM SODIUM 3.38 G: 3; .375 INJECTION, POWDER, LYOPHILIZED, FOR SOLUTION INTRAVENOUS at 02:05

## 2018-01-01 RX ADMIN — RAMELTEON 8 MG: 8 TABLET, FILM COATED ORAL at 02:05

## 2018-01-01 RX ADMIN — HYDROMORPHONE HYDROCHLORIDE 0.1 MG: 2 INJECTION INTRAMUSCULAR; INTRAVENOUS; SUBCUTANEOUS at 10:04

## 2018-01-01 RX ADMIN — BISACODYL 10 MG: 10 SUPPOSITORY RECTAL at 11:04

## 2018-01-01 RX ADMIN — CYANOCOBALAMIN TAB 1000 MCG 1000 MCG: 1000 TAB at 02:05

## 2018-01-01 RX ADMIN — MAGESIUM CITRATE 296 ML: 1.75 LIQUID ORAL at 07:04

## 2018-01-01 RX ADMIN — SODIUM CHLORIDE: 0.9 INJECTION, SOLUTION INTRAVENOUS at 05:04

## 2018-01-01 RX ADMIN — PANTOPRAZOLE SODIUM 40 MG: 40 INJECTION, POWDER, LYOPHILIZED, FOR SOLUTION INTRAVENOUS at 10:05

## 2018-01-01 RX ADMIN — SODIUM CHLORIDE 1500 MG: 9 INJECTION, SOLUTION INTRAVENOUS at 11:05

## 2018-01-01 RX ADMIN — ENOXAPARIN SODIUM 40 MG: 100 INJECTION SUBCUTANEOUS at 06:05

## 2018-01-01 RX ADMIN — VANCOMYCIN HYDROCHLORIDE 1750 MG: 1 INJECTION, POWDER, LYOPHILIZED, FOR SOLUTION INTRAVENOUS at 07:05

## 2018-01-01 RX ADMIN — CIPROFLOXACIN 400 MG: 2 INJECTION, SOLUTION INTRAVENOUS at 01:05

## 2018-01-01 RX ADMIN — MIDAZOLAM 3 MG/HR: 5 INJECTION INTRAMUSCULAR; INTRAVENOUS at 02:05

## 2018-01-01 RX ADMIN — PHENYLEPHRINE HYDROCHLORIDE 100 MCG: 10 INJECTION INTRAVENOUS at 02:04

## 2018-01-01 RX ADMIN — I.V. FAT EMULSION 250 ML: 20 EMULSION INTRAVENOUS at 10:05

## 2018-01-01 RX ADMIN — FUROSEMIDE 20 MG: 10 INJECTION, SOLUTION INTRAMUSCULAR; INTRAVENOUS at 11:05

## 2018-01-01 RX ADMIN — ETOMIDATE 14 MG: 2 INJECTION, SOLUTION INTRAVENOUS at 02:04

## 2018-01-01 RX ADMIN — LIDOCAINE HYDROCHLORIDE: 20 SOLUTION ORAL; TOPICAL at 03:04

## 2018-01-01 RX ADMIN — VANCOMYCIN HYDROCHLORIDE 1750 MG: 1 INJECTION, POWDER, LYOPHILIZED, FOR SOLUTION INTRAVENOUS at 11:05

## 2018-01-01 RX ADMIN — METOCLOPRAMIDE 10 MG: 5 INJECTION, SOLUTION INTRAMUSCULAR; INTRAVENOUS at 01:05

## 2018-01-01 RX ADMIN — POLYETHYLENE GLYCOL (3350) 17 G: 17 POWDER, FOR SOLUTION ORAL at 11:05

## 2018-01-01 RX ADMIN — SODIUM CHLORIDE, SODIUM LACTATE, POTASSIUM CHLORIDE, AND CALCIUM CHLORIDE: .6; .31; .03; .02 INJECTION, SOLUTION INTRAVENOUS at 02:04

## 2018-01-01 RX ADMIN — SODIUM CHLORIDE 1750 MG: 9 INJECTION, SOLUTION INTRAVENOUS at 04:05

## 2018-01-01 RX ADMIN — PHYTONADIONE 10 MG: 5 TABLET ORAL at 02:05

## 2018-01-01 RX ADMIN — RAMELTEON 8 MG: 8 TABLET, FILM COATED ORAL at 02:04

## 2018-01-01 RX ADMIN — MAGNESIUM SULFATE HEPTAHYDRATE 2 G: 40 INJECTION, SOLUTION INTRAVENOUS at 12:05

## 2018-01-01 RX ADMIN — ALBUMIN HUMAN 50 G: 0.25 SOLUTION INTRAVENOUS at 08:05

## 2018-01-01 RX ADMIN — FUROSEMIDE 20 MG: 10 INJECTION, SOLUTION INTRAMUSCULAR; INTRAVENOUS at 02:05

## 2018-01-01 RX ADMIN — DOCUSATE SODIUM 100 MG: 100 CAPSULE, LIQUID FILLED ORAL at 05:04

## 2018-01-01 RX ADMIN — VANCOMYCIN HYDROCHLORIDE 1250 MG: 500 INJECTION, POWDER, LYOPHILIZED, FOR SOLUTION INTRAVENOUS at 11:05

## 2018-01-01 RX ADMIN — FENTANYL CITRATE 25 MCG: 50 INJECTION, SOLUTION INTRAMUSCULAR; INTRAVENOUS at 04:04

## 2018-01-01 RX ADMIN — MIDAZOLAM 4 MG/HR: 5 INJECTION INTRAMUSCULAR; INTRAVENOUS at 10:05

## 2018-01-01 RX ADMIN — SODIUM CHLORIDE: 0.9 INJECTION, SOLUTION INTRAVENOUS at 04:04

## 2018-01-01 RX ADMIN — FUROSEMIDE 40 MG: 10 INJECTION, SOLUTION INTRAMUSCULAR; INTRAVENOUS at 11:05

## 2018-01-01 RX ADMIN — FENTANYL CITRATE 25 MCG/HR: 50 INJECTION INTRAVENOUS at 07:05

## 2018-01-01 RX ADMIN — IOHEXOL 100 ML: 350 INJECTION, SOLUTION INTRAVENOUS at 03:04

## 2018-01-01 RX ADMIN — ASCORBIC ACID, VITAMIN A PALMITATE, CHOLECALCIFEROL, THIAMINE HYDROCHLORIDE, RIBOFLAVIN-5 PHOSPHATE SODIUM, PYRIDOXINE HYDROCHLORIDE, NIACINAMIDE, DEXPANTHENOL, ALPHA-TOCOPHEROL ACETATE, VITAMIN K1, FOLIC ACID, BIOTIN, CYANOCOBALAMIN: 200; 3300; 200; 6; 3.6; 6; 40; 15; 10; 150; 600; 60; 5 INJECTION, SOLUTION INTRAVENOUS at 09:05

## 2018-01-01 RX ADMIN — EPHEDRINE SULFATE 25 MG: 50 INJECTION, SOLUTION INTRAMUSCULAR; INTRAVENOUS; SUBCUTANEOUS at 02:04

## 2018-01-01 RX ADMIN — VANCOMYCIN HYDROCHLORIDE 1750 MG: 1 INJECTION, POWDER, LYOPHILIZED, FOR SOLUTION INTRAVENOUS at 05:05

## 2018-04-05 NOTE — ED NOTES
Call placed to [facility] for authorization for transport. Awaiting return call with number

## 2018-04-05 NOTE — ED PROVIDER NOTES
Encounter Date: 4/5/2018       History     Chief Complaint   Patient presents with    Chest Pain     pt reports Dull/ache in chest x 1-2hrs, mild sob and swelling in bilat. lower ext.     Chief complaint: Abdominal pain    History of present illness:Yamil Santiago Jr. is a 89 y.o. male who presents with  abdominal pain which radiates cephalad that began approximately one hour prior to arrival.  He denies any chest pain and has had no nausea or vomiting.  There has been no diaphoresis or shortness of breath.  Pain has improved.  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  He also has hepatitis C.          Review of patient's allergies indicates:  No Known Allergies  Past Medical History:   Diagnosis Date    Hepatitis C     Neck injury     Paraplegic spinal paralysis      History reviewed. No pertinent surgical history.  History reviewed. No pertinent family history.  Social History   Substance Use Topics    Smoking status: Never Smoker    Smokeless tobacco: Not on file    Alcohol use No     Review of Systems   Constitutional: Negative for activity change, appetite change, chills, fatigue and fever.   Eyes: Negative for visual disturbance.   Respiratory: Negative for apnea and shortness of breath.    Cardiovascular: Negative for chest pain and palpitations.   Gastrointestinal: Positive for abdominal pain. Negative for abdominal distention.   Genitourinary: Negative for difficulty urinating.   Musculoskeletal: Negative for neck pain.   Skin: Negative for pallor and rash.   Neurological: Negative for headaches.   Hematological: Does not bruise/bleed easily.   Psychiatric/Behavioral: Negative for agitation.       Physical Exam     Initial Vitals [04/05/18 0324]   BP Pulse Resp Temp SpO2   -- 83 18 98 °F (36.7 °C) 95 %      MAP       --         Physical Exam    Nursing note and vitals reviewed.  Constitutional: He appears well-developed and well-nourished.   HENT:   Head:  Normocephalic and atraumatic.   Eyes: Conjunctivae are normal.   Neck: Normal range of motion. Neck supple.   Cardiovascular: Normal rate, regular rhythm and normal heart sounds. Exam reveals no gallop and no friction rub.    No murmur heard.  Pulmonary/Chest: Breath sounds normal. No respiratory distress. He has no wheezes. He has no rhonchi. He has no rales.   Abdominal: Soft. Bowel sounds are normal. He exhibits no distension. There is no tenderness. There is no rebound.   Musculoskeletal: Normal range of motion. He exhibits edema.   Neurological: He is alert and oriented to person, place, and time.   Skin: Skin is warm and dry.   Psychiatric: He has a normal mood and affect.         ED Course   Procedures  Labs Reviewed   CBC W/ AUTO DIFFERENTIAL   COMPREHENSIVE METABOLIC PANEL   LIPASE   URINALYSIS   TROPONIN I     EKG Readings: (Independently Interpreted)   Rhythm: Normal Sinus Rhythm. Heart Rate: 87. Ectopy: No Ectopy. Conduction: Normal. ST Segments: Normal ST Segments. T Waves: Normal. Axis: Normal. Clinical Impression: Normal Sinus Rhythm          Medical Decision Making:   ED Management:  Yamil Santiago Jr. is a 89 y.o. male who presents with  abdominal pain.  CT of the abdomen and pelvis reveals Prominent colon with air fluid levels. This may be related to the increased stool within the sigmoidand rectum. Obstipation versus constipation should be considered.  He denies any chest pain but has no evidence of ischemia/MI.  There is no evidence of aortic dissection/aneurysm, bowel obstruction or intra-abdominal infection.  He will be treated with oral magnesium citrate.                      Clinical Impression:   The primary encounter diagnosis was Constipation, unspecified constipation type. Diagnoses of Epigastric pain and Chest pain were also pertinent to this visit.                           Alex Young III, MD  04/05/18 3993

## 2018-04-22 NOTE — ED PROVIDER NOTES
"89 Encounter Date: 4/21/2018    SCRIBE #1 NOTE: I, Con Stringer, am scribing for, and in the presence of, Dr. Tejada.       History     Chief Complaint   Patient presents with    Cystitis     "stinging in bladder" and weakness       04/21/2018 8:34 PM     Chief complaint: Dysuria      Yamil Santiago Jr. is a 89 y.o. male with a past medical history of neck injury and paraplegic spinal paralysis presenting to the ED via EMS with a gradual onset of acute dysuria beginning 3 days ago. EMS reported BM during transfer, urinary frequency, and no fever. The pt reported he has not visited a urologist in over a year.  Associated symptoms of urinary frequency, dizziness, and hematuria. The pt denied fever, back pain, and abdominal pain. He stated he has not taken any pain medication PTA. The pt denied history of smoking.        The history is provided by the patient and the EMS personnel.     Review of patient's allergies indicates:  No Known Allergies  Past Medical History:   Diagnosis Date    Hepatitis C     Neck injury     Paraplegic spinal paralysis      History reviewed. No pertinent surgical history.  History reviewed. No pertinent family history.  Social History   Substance Use Topics    Smoking status: Never Smoker    Smokeless tobacco: Never Used    Alcohol use No     Review of Systems   Constitutional: Negative for fever.   HENT: Negative for congestion.    Eyes: Negative for visual disturbance.   Respiratory: Negative for wheezing.    Cardiovascular: Negative for chest pain.   Gastrointestinal: Negative for abdominal pain.   Genitourinary: Positive for dysuria, frequency and hematuria.   Musculoskeletal: Negative for back pain and joint swelling.   Skin: Negative for rash.   Neurological: Positive for dizziness. Negative for syncope.   Hematological: Does not bruise/bleed easily.   Psychiatric/Behavioral: Negative for confusion.       Physical Exam     Initial Vitals   BP Pulse Resp Temp SpO2   04/21/18 2017 " 04/21/18 2017 04/21/18 2017 04/21/18 2026 04/21/18 2017   (!) 136/58 (!) 59 18 98.1 °F (36.7 °C) 98 %      MAP       04/21/18 2017       84         Physical Exam    Nursing note and vitals reviewed.  Constitutional: He appears well-nourished.   HENT:   Head: Normocephalic and atraumatic.   Eyes: Conjunctivae and EOM are normal.   Neck: Normal range of motion. Neck supple. No thyroid mass present.   Cardiovascular: Normal rate, regular rhythm and normal heart sounds. Exam reveals no gallop and no friction rub.    No murmur heard.  Pulmonary/Chest: Breath sounds normal. He has no wheezes. He has no rhonchi. He has no rales.   Abdominal: Soft. Normal appearance and bowel sounds are normal. He exhibits no distension. There is no tenderness. There is no rigidity.   Musculoskeletal:   Paraplegic  RUE contraction   Neurological: He is alert and oriented to person, place, and time. He has normal strength. No cranial nerve deficit or sensory deficit.   Skin: Skin is warm and dry. No rash noted. No erythema.   Psychiatric: He has a normal mood and affect. His speech is normal. Cognition and memory are normal.         ED Course   Procedures  Labs Reviewed   CULTURE, URINE   URINALYSIS             Medical Decision Making:   History:   Old Medical Records: I decided to obtain old medical records.  Clinical Tests:   Lab Tests: Reviewed and Ordered  ED Management:  This is an urgent evaluation for dysuria.  The differential includes UTI, pyelonephritis, and urethritis.   Patient was UA consistent with UTI without suprapubic tenderness. No CVA tenderness, afebrile and nontoxic.  Current vital signs, symptoms, and blood analyses do not suggest evidence of pyelonephritis, sepsis, appendicitis.  The patient does not self catheter.  IV was established and he was provided Rocephin and will discharge with a six-day course of Keflex.  He is asked to follow-up with his urologist or primary care doctor soon as possible.  He is additionally  advised to return to the ER for any new, concerning, or worsening symptoms.  Patient agreeable with this plan for follow-up and he was discharged in stable condition.      Urine culture pending.              Scribe Attestation:   Scribe #1: I performed the above scribed service and the documentation accurately describes the services I performed. I attest to the accuracy of the note.    I, Dr. Jared Tejada, personally performed the services described in this documentation. All medical record entries made by the scribe were at my direction and in my presence.  I have reviewed the chart and agree that the record reflects my personal performance and is accurate and complete. Jared Tejada MD.  6:17 AM 04/22/2018             Clinical Impression:   The encounter diagnosis was Acute UTI.    Disposition:   Disposition: Discharged  Condition: Stable                        Jared Tejdaa MD  04/22/18 0617

## 2018-04-22 NOTE — ED NOTES
AAOx4, skin warm/dry, respirations even/unlabored.  NAD.  Cleaned of incontinent feces.  Collected catherized urine specimen with in/out cath. Patient tolerated well.

## 2018-04-26 PROBLEM — R11.2 NON-INTRACTABLE VOMITING WITH NAUSEA: Status: ACTIVE | Noted: 2018-01-01

## 2018-04-26 PROBLEM — B19.20 HEPATITIS C: Status: ACTIVE | Noted: 2018-01-01

## 2018-04-26 PROBLEM — E44.0 MALNUTRITION OF MODERATE DEGREE: Status: ACTIVE | Noted: 2018-01-01

## 2018-04-26 NOTE — ED NOTES
Pt presents to ED with c/o nausea and vomiting x 2 days. Pt states that he has been feeling bloated also. Denies abdominal pain. Pt recently diagnosed with UTI and prescribed keflex. Believes sx are due to antibiotics. Pt placed on BP, pulse ox monitor. VSS. Awaiting MD arango. Will monitor prn.

## 2018-04-26 NOTE — PROGRESS NOTES
Spoke with patient and wife at bedside. Mrs Santiago requesting home health. Call placed to [facility], informed by Wanda, home health and NP visits in process pending approval. Cell number provided to member services,[phone]. Information given to Mr&Mrs Santiago. Notified dr mitchell.

## 2018-04-26 NOTE — ED NOTES
Pt in room with NAD noted. VSS. Pt denies needs. None identified. Awaiting further labs for dispo. Pt updated on status of care. Will monitor prn.

## 2018-04-26 NOTE — ED PROVIDER NOTES
Encounter Date: 4/26/2018    SCRIBE #1 NOTE: I, Krys Greenberg , am scribing for, and in the presence of, Dr. Mccauley.       History     Chief Complaint   Patient presents with    Emesis     Presents to ED via [facility] with c/o vomiting x 2 days. Recently in ED and diagnosed with a UTI.       04/26/2018 12:24 PM     Chief complaint: Emesis       Yamil Santiago Jr. is a 89 y.o. male with a PMHx of paraplegic spinal paralysis and Hepatits C who presents to the ED with vomiting, constipation,frequent urination, decreased urination, decreased appetite with an onset x 1 day PTA. The patient reports that he was recently discharged from the hospital x 5 days ago for a UTI. Patient reports that he has been taking his antibiotics (500mg Keflex)  as directed, but believes they are making him feel badly. He states that he began vomiting yesterday. He denies nausea at this time .  He states that the vomit was clear. Patient states that he has not eaten in two days. He claims that he has not had a bowel movement in 2-3 days. Patient denies cough, swelling,hematuria, hematemesis, bright red blood in stool, or any other symptoms at this time.  Family reports that they are concerned that the patient may have diabetes. They report that the patient has not seen his PCP since August 2017. Wife reports that the patient became paralysed as a result of a neck injury from football in his early 30's. Patient denies any exacerbation.             Review of patient's allergies indicates:  No Known Allergies  Past Medical History:   Diagnosis Date    Hepatitis C     Neck injury     Paraplegic spinal paralysis      History reviewed. No pertinent surgical history.  History reviewed. No pertinent family history.  Social History   Substance Use Topics    Smoking status: Never Smoker    Smokeless tobacco: Never Used    Alcohol use No     Review of Systems   Constitutional: Positive for appetite change. Negative for fever.   HENT: Negative for  ear discharge, mouth sores, rhinorrhea, sinus pressure and sneezing.    Respiratory: Negative for shortness of breath.    Gastrointestinal: Positive for constipation and vomiting. Negative for diarrhea, nausea and rectal pain.   Genitourinary: Positive for decreased urine volume and urgency. Negative for difficulty urinating, dysuria and flank pain.   Musculoskeletal: Negative for gait problem.   Neurological: Negative for weakness.   Psychiatric/Behavioral: Negative for confusion.   All other systems reviewed and are negative.      Physical Exam     Initial Vitals   BP Pulse Resp Temp SpO2   04/26/18 1148 04/26/18 1148 04/26/18 1148 04/26/18 1221 04/26/18 1148   116/68 89 18 97.9 °F (36.6 °C) 99 %      MAP       04/26/18 1148       84         Physical Exam    Nursing note and vitals reviewed.  Constitutional: He appears well-developed and well-nourished. He is not diaphoretic. No distress.   HENT:   Head: Normocephalic and atraumatic.   Mouth/Throat: Oropharynx is clear and moist.   Eyes: Conjunctivae are normal.   Neck: Normal range of motion. Neck supple.   Cardiovascular: Normal rate, regular rhythm, normal heart sounds and intact distal pulses. Exam reveals no gallop and no friction rub.    No murmur heard.  3+ pitting edema    Pulmonary/Chest: Breath sounds normal. No respiratory distress. He has no wheezes. He has no rhonchi. He has no rales.   Abdominal: Soft. There is generalized tenderness. There is no rigidity, no rebound and no CVA tenderness. No hernia.   Mild voluntary gaurding .No involuntary gaurding.   Musculoskeletal: Normal range of motion.   Neurological: He is alert and oriented to person, place, and time.   Paraplegic spinal paralysis of the bilateral lower extremities.    Skin: No rash noted. No erythema.   Psychiatric: He has a normal mood and affect. His behavior is normal. Judgment and thought content normal.         ED Course   Procedures  Labs Reviewed   CBC W/ AUTO DIFFERENTIAL -  Abnormal; Notable for the following:        Result Value    RBC 4.49 (*)     MCH 31.1 (*)     Gran # (ANC) 8.3 (*)     Gran% 77.7 (*)     Mono% 3.3 (*)     All other components within normal limits   COMPREHENSIVE METABOLIC PANEL - Abnormal; Notable for the following:     Sodium 133 (*)     CO2 21 (*)     Glucose 113 (*)     Albumin 2.8 (*)     Total Bilirubin 1.1 (*)     AST 49 (*)     ALT 58 (*)     All other components within normal limits   URINALYSIS - Abnormal; Notable for the following:     Appearance, UA Hazy (*)     Protein, UA Trace (*)     Ketones, UA Trace (*)     Occult Blood UA 3+ (*)     Leukocytes, UA 3+ (*)     All other components within normal limits   URINALYSIS MICROSCOPIC - Abnormal; Notable for the following:     RBC, UA 18 (*)     WBC, UA >100 (*)     Bacteria, UA Moderate (*)     All other components within normal limits   LIPASE             Medical Decision Making:   History:   I obtained history from: someone other than patient.  Old Medical Records: I decided to obtain old medical records.  Clinical Tests:   Lab Tests: Ordered and Reviewed  Radiological Study: Ordered and Reviewed        Imaging Results          CT Abdomen Pelvis With Contrast (Final result)  Result time 04/26/18 13:43:56    Final result by Shubham Austin Jr., MD (04/26/18 13:43:56)                 Impression:      Probable adynamic ileus with all the small bowel loops containing fluid but without marked distention seen.  The colon contains stool but is not distended.  Severe prostate hypertrophy.  Bilateral small pleural effusions right greater than left and trace ascites around the liver      Electronically signed by: Shubham Austin MD  Date:    04/26/2018  Time:    13:43             Narrative:    EXAMINATION:  CT ABDOMEN PELVIS WITH CONTRAST    CLINICAL HISTORY:  Emesis, diffuse abdominal pain, r/o obstruction;    TECHNIQUE:  Low dose axial images, sagittal and coronal reformations were obtained from the lung  bases to the pubic symphysis following the IV administration of 75 mL of Omnipaque 350.  The patient did not receive oral contrast.  Prior CT of 5 April 2018.    COMPARISON:  None.    FINDINGS:  There are bilateral small pleural effusions right greater than left and moderate atelectasis at both lung bases.    The liver is of normal size and CT density.  A trace ascites is noted adjacent to the liver.  A gallbladder is not seen.  The pancreas is atrophied without mass or edema.  The spleen is of normal size and CT density as well.    The adrenal glands are not enlarged.  The kidneys are of normal size contour and contrast enhancement.  The abdominal aorta and inferior vena cava are of normal caliber.    All the small bowel loops contain liquid but are not markedly distended.  The colon contains stool and is not dilated on today's study.  No free fluid or free air is seen.    In the pelvis there is marked prostate hypertrophy.  The bladder is otherwise of normal size and wall thickness.                                     Scribe Attestation:   Scribe #1: I performed the above scribed service and the documentation accurately describes the services I performed. I attest to the accuracy of the note.      Imaging Results          CT Abdomen Pelvis With Contrast (Final result)  Result time 04/26/18 13:43:56    Final result by Shubham Austin Jr., MD (04/26/18 13:43:56)                 Impression:      Probable adynamic ileus with all the small bowel loops containing fluid but without marked distention seen.  The colon contains stool but is not distended.  Severe prostate hypertrophy.  Bilateral small pleural effusions right greater than left and trace ascites around the liver      Electronically signed by: Shubham Austin MD  Date:    04/26/2018  Time:    13:43             Narrative:    EXAMINATION:  CT ABDOMEN PELVIS WITH CONTRAST    CLINICAL HISTORY:  Emesis, diffuse abdominal pain, r/o  obstruction;    TECHNIQUE:  Low dose axial images, sagittal and coronal reformations were obtained from the lung bases to the pubic symphysis following the IV administration of 75 mL of Omnipaque 350.  The patient did not receive oral contrast.  Prior CT of 5 April 2018.    COMPARISON:  None.    FINDINGS:  There are bilateral small pleural effusions right greater than left and moderate atelectasis at both lung bases.    The liver is of normal size and CT density.  A trace ascites is noted adjacent to the liver.  A gallbladder is not seen.  The pancreas is atrophied without mass or edema.  The spleen is of normal size and CT density as well.    The adrenal glands are not enlarged.  The kidneys are of normal size contour and contrast enhancement.  The abdominal aorta and inferior vena cava are of normal caliber.    All the small bowel loops contain liquid but are not markedly distended.  The colon contains stool and is not dilated on today's study.  No free fluid or free air is seen.    In the pelvis there is marked prostate hypertrophy.  The bladder is otherwise of normal size and wall thickness.                            (rad read)    The patient was informed of the incidental finding(s) as well as the need for PCP or specialist follow-up for reevaluation and possible further investigation or monitoring.          ED Course as of Apr 26 2308   Thu Apr 26, 2018   1313 EKG:  Sinus rhythm with PACs, rate of 69, normal intervals and axis.  No new ST/T wave changes compared to last prior.  No sign of acute ischemia or infarction.    [MR]      ED Course User Index  [MR] Ash Mccauley MD     I, Dr. Ash Mccauley, personally performed the services described in this documentation. All medical record entries made by the scribe were at my direction and in my presence.  I have reviewed the chart and agree that the record reflects my personal performance and is accurate and complete. Ash Mccauley MD.  11:07  PM 04/26/2018    Yamil Santiago Jr. is a 89 y.o. male presenting with emesis in the setting of questionable UTI as well as adynamic ileus noted on CT.  No sign of fixed obstruction.  No sign of other emergent process such as cholecystitis, appendicitis, perforated viscus.  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  Patient and family are very much in favor of admission and have spoken with Dr. Sorensen from the hospitalist service with bowel regimen initiated.  I do not think immediate surgical intervention is indicated.  Questionable colonization versus UTI would last urine culture nondiagnostic.  I will continue current course of Keflex.  I do not think change in antibiotics or burning of coverage is indicated at this point.  I doubt sepsis.  He has had no further emesis over several hours in the emergency department.    Clinical Impression:   The primary encounter diagnosis was Non-intractable vomiting with nausea, unspecified vomiting type. A diagnosis of Ileus was also pertinent to this visit.    Disposition:   Disposition: Admitted                        Ash Mccauley MD  04/26/18 2880

## 2018-04-26 NOTE — H&P
PCP: Tima Schuster MD    History & Physical    Chief Complaint: Vomiting for 2 days and dysuria    History of Present Illness:  Patient is a 89 y.o. male admitted to Hospitalist Service from [facility] with complaint of vomiting for 2 days and dysuria. 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 Part of the history obtained from patient's wife. Patient has been taking PO Keflex for UTI diagnosed 4 days ago.  presents to the ED with vomiting, constipation,frequent urination, decreased urination, decreased appetite with an onset x 1 day PTA. The patient reports that he was recently discharged from the hospital x 5 days ago for a UTI. Patient reports that he has been taking his antibiotics (500mg Keflex)  as directed, but believes they are making him feel badly. He states that he began vomiting yesterday. He denies nausea at this time .  He states that the vomit was clear. Patient states that he has not eaten in two days. He claims that he has not had a bowel movement in 2-3 days. Patient denies cough, swelling,hematuria, hematemesis, bright red blood in stool, or any other symptoms at this time.  Family reports that they are concerned that the patient may have diabetes. They report that the patient has not seen his PCP since August 2017. No bed sores reported. Patient denied chest pain, shortness of breath, headache, vision changes, focal neuro-deficits, cough or fever.    Past Medical History:   Diagnosis Date    Hepatitis C     Neck injury     Paraplegic spinal paralysis      No past surgical history on file.  No family history on file.  Social History   Substance Use Topics    Smoking status: Never Smoker    Smokeless tobacco: Never Used    Alcohol use No      Review of patient's allergies indicates:  No Known Allergies    (Not in a hospital admission)  Review of  Systems:  Constitutional: no fever or chills  Eyes: no visual changes  Ears, nose, mouth, throat, and face: no nasal congestion or sore throat  Respiratory: no cough or shorness of breath  Cardiovascular: no chest pain or palpitations  Gastrointestinal: see HPI  Genitourinary: no hematuria . + see HPI  Integument/breast: no rash or pruritis  Hematologic/lymphatic: no easy bruising or lymphadenopathy  Musculoskeletal: no arthralgias or myalgias  Neurological: no seizures or tremors.  Behavioral/Psych: no auditory or visual hallucinations  Endocrine: no heat or cold intolerance     OBJECTIVE:     Vital Signs (Most Recent)  Temp: 97.9 °F (36.6 °C) (04/26/18 1221)  Pulse: 62 (04/26/18 1402)  Resp: 18 (04/26/18 1148)  BP: 137/80 (04/26/18 1402)  SpO2: 97 % (04/26/18 1402)    Physical Exam:  General appearance: well developed, appears stated age  Head: normocephalic, atraumatic  Eyes:  conjunctivae/corneas clear. PERRL.  Nose: Nares normal. Septum midline.  Throat: lips, mucosa, and tongue normal; teeth and gums normal, no throat erythema.  Neck: supple, symmetrical, trachea midline, no JVD and thyroid not enlarged, symmetric, no tenderness/mass/nodules  Lungs:  clear to auscultation bilaterally and normal respiratory effort  Chest wall: no tenderness  Heart: regular rate and rhythm, S1, S2 normal, no murmur, click, rub or gallop  Abdomen: soft, non-tender non-distented; bowel sounds normal; no masses,  no organomegaly  Extremities: no cyanosis, clubbing. 2+ edema.   Pulses: 2+ and symmetric  Skin: Skin color, texture, turgor normal. No rashes or lesions.  Lymph nodes: Cervical, supraclavicular, and axillary nodes normal.  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 CNII-XII intact.      Laboratory:   CBC:   Recent Labs  Lab 04/26/18  1255   WBC 10.70   RBC 4.49*   HGB 14.0   HCT 42.4      MCV 95   MCH 31.1*   MCHC 32.9     CMP:   Recent Labs  Lab 04/26/18  1255   *    CALCIUM 9.0   ALBUMIN 2.8*   PROT 6.7   *   K 4.7   CO2 21*      BUN 13   CREATININE 0.7   ALKPHOS 82   ALT 58*   AST 49*   BILITOT 1.1*     Microbiology Results (last 7 days)     Procedure Component Value Units Date/Time    Urine culture [[id number]] Collected:  04/26/18 1332    Order Status:  Sent Specimen:  Urine from Urine, Catheterized Updated:  04/26/18 1332          Recent Labs  Lab 04/26/18 1332   COLORU Yellow   SPECGRAV 1.020   PHUR 7.0   PROTEINUA Trace*   BACTERIA Moderate*   NITRITE Negative   LEUKOCYTESUR 3+*   UROBILINOGEN 1.0     Microbiology Results (last 7 days)     Procedure Component Value Units Date/Time    Urine culture [[id number]] Collected:  04/26/18 1332    Order Status:  Sent Specimen:  Urine from Urine, Catheterized Updated:  04/26/18 1332        Diagnostic Results:  CT abdomen and pelvis with contrast: Probable adynamic ileus with all the small bowel loops containing fluid but without marked distention seen.  The colon contains stool but is not distended.  Severe prostate hypertrophy.  Bilateral small pleural effusions right greater than left and trace ascites around the liver    Assessment/Plan:     Active Hospital Problems    Diagnosis  POA    Non-intractable vomiting with nausea [R11.2]  Constipation  Start Laxative and stool softeners.  Repeat KUB in AM.  Continue clear liquids for now.    Yes    Malnutrition of moderate degree [E44.0]  Nutrition consulted. Encourage maximal PO intake. Diet supplementation ordered per nutrition approval. Will encourage PO and monitor closely for weight changes.    Yes    Hepatitis C [B19.20]  Trend LFTs.    UTI  Urine C/S. Start IV Cipro.    Yes    CAD (coronary artery disease) [I25.10]  Patient with known CAD and monitor for S/Sx of angina/ACS. Continue to monitor on telemetry.    Yes    History of hypertension [Z86.79]  Chronic problem. Will continue chronic medications and monitor for any changes, adjusting as needed.    Not  Applicable    Paraplegia [G82.20]  Supportive care.  Fall precautions.  Skin care.  Yes        DVT prophylaxis: Lovenox 40 mg SQ q day.    Discussed with patient's wife.    Kimberly Sorensen MD  Department of Hospital Medicine   [facility]

## 2018-04-26 NOTE — ED NOTES
Report called to floor. Pt updated. Denies needs at present. None identified. Pt transferred to  via stretcher without difficulty.

## 2018-04-26 NOTE — ED NOTES
Pt c/o nausea after drinking Mg Citrate. PRN Zofran released and given per MD orders. No other needs identified.

## 2018-04-27 PROBLEM — K56.609 SBO (SMALL BOWEL OBSTRUCTION): Status: ACTIVE | Noted: 2018-01-01

## 2018-04-27 NOTE — PLAN OF CARE
Patient reports living with spouse, Nicole Santiago [phone], denies  home health services. POA is son Umesh Santiago, son, patient not able to provide contact number. CM tried to call spouse but unable to reach or leave message.  PCP is Dr. Schuster, pharmacy is [facility], patient reports that he is non-ambulatory and uses power chair. Patient reports needing assistance with ADL's and is usually provided by son or spouse.      04/27/18 1200   Discharge Assessment   Assessment Type Discharge Planning Assessment   Confirmed/corrected address and phone number on facesheet? Yes   Assessment information obtained from? Patient   Communicated expected length of stay with patient/caregiver yes   Prior to hospitilization cognitive status: Alert/Oriented   Prior to hospitalization functional status: Assistive Equipment   Current cognitive status: Alert/Oriented   Current Functional Status: Assistive Equipment   Lives With spouse   Able to Return to Prior Arrangements yes   Is patient able to care for self after discharge? Yes   Patient's perception of discharge disposition home or selfcare   Readmission Within The Last 30 Days no previous admission in last 30 days   Patient currently being followed by outpatient case management? No   Patient currently receives any other outside agency services? No   Equipment Currently Used at Home power chair   Do you have any problems affording any of your prescribed medications? No   Is the patient taking medications as prescribed? yes   Does the patient have transportation home? Yes   Transportation Available family or friend will provide   Does the patient receive services at the [facility]? No   Discharge Plan A Home   Patient/Family In Agreement With Plan yes

## 2018-04-27 NOTE — HPI
Patient is a 89 y.o. male admitted to Hospitalist Service from [facility] Room with complaint of vomiting for 2 days and dysuria. 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 Part of the history obtained from patient's wife. Patient has been taking PO Keflex for UTI diagnosed 4 days ago.  presents to the ED with vomiting, constipation,frequent urination, decreased urination, decreased appetite with an onset x 1 day PTA. The patient reports that he was recently discharged from the hospital x 5 days ago for a UTI. Patient reports that he has been taking his antibiotics (500mg Keflex)  as directed, but believes they are making him feel badly. He states that he began vomiting yesterday. He denies nausea at this time .  He states that the vomit was clear. Patient states that he has not eaten in two days. He claims that he has not had a bowel movement in 2-3 days. Patient denies cough, swelling,hematuria, hematemesis, bright red blood in stool, or any other symptoms at this time.  Family reports that they are concerned that the patient may have diabetes. They report that the patient has not seen his PCP since August 2017. No bed sores reported. Patient denied chest pain, shortness of breath, headache, vision changes, focal neuro-deficits, cough or fever.

## 2018-04-27 NOTE — ASSESSMENT & PLAN NOTE
Ileus vs early SBO.  Will keep on NG suction overnight and repeat KUB  May need SBS if no improvement  Dulcolax ordered by hospitalist.

## 2018-04-27 NOTE — PROGRESS NOTES
Progress Note  Hospital Medicine  Patient Name:Yamil Santiago Jr.  MRN:  [id number]  Patient Class: OP- Observation  Admit Date: 4/26/2018  Length of Stay: 0 days  Expected Discharge Date:   Attending Physician: Kimberly Sorensen MD  Primary Care Provider:  Tima Schuster MD    SUBJECTIVE:     Principal Problem: Vomiting for 2 days and dysuria  Initial history of present illness: Patient is a 89 y.o. male admitted to Hospitalist Service from [facility] with complaint of vomiting for 2 days and dysuria. 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 Part of the history obtained from patient's wife. Patient has been taking PO Keflex for UTI diagnosed 4 days ago.  presents to the ED with vomiting, constipation,frequent urination, decreased urination, decreased appetite with an onset x 1 day PTA. The patient reports that he was recently discharged from the hospital x 5 days ago for a UTI. Patient reports that he has been taking his antibiotics (500mg Keflex)  as directed, but believes they are making him feel badly. He states that he began vomiting yesterday. He denies nausea at this time .  He states that the vomit was clear. Patient states that he has not eaten in two days. He claims that he has not had a bowel movement in 2-3 days. Patient denies cough, swelling,hematuria, hematemesis, bright red blood in stool, or any other symptoms at this time. Family reports that they are concerned that the patient may have diabetes. They report that the patient has not seen his PCP since August 2017. No bed sores reported. Patient denied chest pain, shortness of breath, headache, vision changes, focal neuro-deficits, cough or fever.    PMH/PSH/SH/FH/Meds: reviewed.    Symptoms/Review of Systems: Patient is vomiting bile, no BM despite laxatives use. No shortness of breath, cough, chest pain or  headache, fever or abdominal pain.     Diet:  NPO  Activity level: Quadriplegic  Pain:  Patient reports no pain.       OBJECTIVE:   Vital Signs (Most Recent):      Temp: 98.8 °F (37.1 °C) (04/27/18 0805)  Pulse: 92 (04/27/18 0805)  Resp: 20 (04/27/18 0805)  BP: 127/61 (04/27/18 0805)  SpO2: (!) 90 % (04/27/18 0813)       Vital Signs Range (Last 24H):  Temp:  [97.8 °F (36.6 °C)-98.8 °F (37.1 °C)]   Pulse:  [62-92]   Resp:  [15-20]   BP: (116-137)/(58-91)   SpO2:  [84 %-99 %]     Weight: 81.8 kg (180 lb 5.4 oz)  Body mass index is 24.46 kg/m².    Intake/Output Summary (Last 24 hours) at 04/27/18 1104  Last data filed at 04/27/18 0345   Gross per 24 hour   Intake          1842.08 ml   Output             1150 ml   Net           692.08 ml     Physical Examination:  General appearance: well developed, appears stated age  Head: normocephalic, atraumatic  Eyes:  conjunctivae/corneas clear. PERRL.  Nose: Nares normal. Septum midline.  Throat: lips, mucosa, and tongue normal; teeth and gums normal, no throat erythema.  Neck: supple, symmetrical, trachea midline, no JVD and thyroid not enlarged, symmetric, no tenderness/mass/nodules  Lungs:  clear to auscultation bilaterally and normal respiratory effort  Chest wall: no tenderness  Heart: regular rate and rhythm, S1, S2 normal, no murmur, click, rub or gallop  Abdomen: soft, non-tender non-distented; bowel sounds hypoactive; no masses,  no organomegaly  Extremities: no cyanosis, clubbing. 2+ edema.   Pulses: 2+ and symmetric  Skin: Skin color, texture, turgor normal. No rashes or lesions.  Lymph nodes: Cervical, supraclavicular, and axillary nodes normal.  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 CNII-XII intact.      CBC:    Recent Labs  Lab 04/21/18 2110 04/26/18  1255   WBC 7.30 10.70   RBC 4.03* 4.49*   HGB 12.6* 14.0   HCT 37.9* 42.4    194   MCV 94 95   MCH 31.4* 31.1*   MCHC 33.4 32.9   BMP    Recent Labs  Lab  04/21/18  2110 04/26/18  1255    113*   * 133*   K 4.7 4.7    102   CO2 27 21*   BUN 12 13   CREATININE 0.8 0.7   CALCIUM 8.9 9.0      Diagnostic Results:  Microbiology Results (last 7 days)     Procedure Component Value Units Date/Time    Urine culture [[id number]] Collected:  04/26/18 1332    Order Status:  Sent Specimen:  Urine from Urine, Catheterized Updated:  04/26/18 2109    Urine culture [[id number]] Collected:  04/26/18 1332    Order Status:  Canceled Specimen:  Urine from Urine, Catheterized Updated:  04/26/18 1638         CT abdomen and pelvis with contrast: Probable adynamic ileus with all the small bowel loops containing fluid but without marked distention seen.  The colon contains stool but is not distended.  Severe prostate hypertrophy.  Bilateral small pleural effusions right greater than left and trace ascites around the liver    Assessment/Plan:      Non-intractable vomiting with nausea [R11.2]  Small Bowel Obstruction  Constipation  NPO now. Place NGT with LIS. Consult general surgeon  Repeat KUB in AM.      Yes    Malnutrition of moderate degree [E44.0]  Nutrition consulted. Encourage maximal PO intake. Diet supplementation ordered per nutrition approval. Will encourage PO and monitor closely for weight changes.      Yes    Hepatitis C [B19.20]  Trend LFTs.     UTI  Urine C/S. Continue IV Cipro.      Yes    CAD (coronary artery disease) [I25.10]  Patient with known CAD and monitor for S/Sx of angina/ACS. Continue to monitor on telemetry.      Yes    History of hypertension [Z86.79]  Chronic problem. Will continue chronic medications and monitor for any changes, adjusting as needed.      Not Applicable    Paraplegia [G82.20]  Supportive care.  Fall precautions.  Skin care.   Yes          DVT prophylaxis: Lovenox 40 mg SQ q day.     Discussed with patient's wife.    VTE Risk Mitigation         Ordered     enoxaparin injection 40 mg  Daily      04/26/18 1628     IP VTE HIGH  RISK PATIENT  Once      04/26/18 2931        Kimberly Sorensen MD  Department of Hospital Medicine   [facility]

## 2018-04-27 NOTE — NURSING
14 [place] NG placed.    Awaiting xray for placement verification  Patient tolerated procedure well

## 2018-04-27 NOTE — NURSING
Dr Peña in.  NG changed to 18 [place].    Bile return    1000cc's.  Patient bladder scanned for 200cc's

## 2018-04-27 NOTE — PLAN OF CARE
Problem: Patient Care Overview  Goal: Plan of Care Review  Patient alert and oriented.  Full liquid diet.  PRN medication given for nausea.  Full code.  Fall precautions maintained.  Elevated legs to help with edema.   Call light in reach.  Bed in low/locked position.

## 2018-04-27 NOTE — CONSULTS
[facility]  General Surgery  Consult Note    Patient Name: Yamil Santiago Jr.  MRN: [id number]  Code Status: Full Code  Admission Date: 4/26/2018  Hospital Length of Stay: 0 days  Attending Physician: Joshua Sorensen MD  Primary Care Provider: Tima Schuster MD    Patient information was obtained from patient, spouse/SO, past medical records and ER records.     Inpatient consult to General Surgery  Consult performed by: XIOMARA VOGEL  Consult ordered by: JOSHUA SORENSEN        Subjective:     Principal Problem: <principal problem not specified>    History of Present Illness:   Patient is a 89 y.o. male admitted to Hospitalist Service from [facility] with complaint of vomiting for 2 days and dysuria. 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 Part of the history obtained from patient's wife. Patient has been taking PO Keflex for UTI diagnosed 4 days ago.  presents to the ED with vomiting, constipation,frequent urination, decreased urination, decreased appetite with an onset x 1 day PTA. The patient reports that he was recently discharged from the hospital x 5 days ago for a UTI. Patient reports that he has been taking his antibiotics (500mg Keflex)  as directed, but believes they are making him feel badly. He states that he began vomiting yesterday. He denies nausea at this time .  He states that the vomit was clear. Patient states that he has not eaten in two days. He claims that he has not had a bowel movement in 2-3 days. Patient denies cough, swelling,hematuria, hematemesis, bright red blood in stool, or any other symptoms at this time.  Family reports that they are concerned that the patient may have diabetes. They report that the patient has not seen his PCP since August 2017. No bed sores reported. Patient denied chest pain, shortness of breath, headache,  vision changes, focal neuro-deficits, cough or fever.  CT showed:  FINDINGS:  There are bilateral small pleural effusions right greater than left and moderate atelectasis at both lung bases.    The liver is of normal size and CT density.  A trace ascites is noted adjacent to the liver.  A gallbladder is not seen.  The pancreas is atrophied without mass or edema.  The spleen is of normal size and CT density as well.    The adrenal glands are not enlarged.  The kidneys are of normal size contour and contrast enhancement.  The abdominal aorta and inferior vena cava are of normal caliber.    All the small bowel loops contain liquid but are not markedly distended.  The colon contains stool and is not dilated on today's study.  No free fluid or free air is seen.    In the pelvis there is marked prostate hypertrophy.  The bladder is otherwise of normal size and wall thickness.   Impression       Probable adynamic ileus with all the small bowel loops containing fluid but without marked distention seen.  The colon contains stool but is not distended.  Severe prostate hypertrophy.  Bilateral small pleural effusions right greater than left and trace ascites around the liver       Patient had NG tube placed today, but not in good position.  Replaced by me with good return of stomach contents (1000cc)       No current facility-administered medications on file prior to encounter.      Current Outpatient Prescriptions on File Prior to Encounter   Medication Sig    calcitonin, salmon, (FORTICAL) 200 unit/actuation nasal spray 1 spray by Nasal route once daily. Alternate nares.    cephALEXin (KEFLEX) 500 MG capsule Take 1 capsule (500 mg total) by mouth 4 (four) times daily.    tamsulosin (FLOMAX) 0.4 mg Cp24 Take 0.4 mg by mouth once daily.    triamterene-hydrochlorothiazide 37.5-25 mg (DYAZIDE) 37.5-25 mg per capsule Take 1 capsule by mouth every morning.       Review of patient's allergies indicates:  No Known  Allergies    Past Medical History:   Diagnosis Date    Hepatitis C     Neck injury     Paraplegic spinal paralysis      History reviewed. No pertinent surgical history.  Family History     None        Social History Main Topics    Smoking status: Never Smoker    Smokeless tobacco: Never Used    Alcohol use No    Drug use: No    Sexual activity: No     Review of Systems   Constitutional: Negative for activity change, appetite change, chills, fatigue, fever and unexpected weight change.   HENT: Negative for congestion, dental problem, hearing loss, nosebleeds, sinus pressure, sore throat and voice change.    Eyes: Negative for pain and visual disturbance.   Respiratory: Negative for cough, chest tightness and shortness of breath.    Cardiovascular: Negative for chest pain, palpitations and leg swelling.   Gastrointestinal: Positive for abdominal distention, nausea and vomiting. Negative for abdominal pain, constipation and diarrhea.   Endocrine: Negative for cold intolerance and heat intolerance.   Genitourinary: Negative for difficulty urinating, flank pain, frequency and hematuria.   Musculoskeletal: Negative for arthralgias, back pain, gait problem, joint swelling, myalgias, neck pain and neck stiffness.   Skin: Negative for rash.   Allergic/Immunologic: Negative for immunocompromised state.   Neurological: Positive for weakness (paraplegia). Negative for dizziness, tremors, seizures, syncope, light-headedness, numbness and headaches.   Hematological: Negative for adenopathy. Does not bruise/bleed easily.   Psychiatric/Behavioral: Negative for confusion, decreased concentration, hallucinations, sleep disturbance and suicidal ideas. The patient is not nervous/anxious.      Objective:     Vital Signs (Most Recent):  Temp: 98.5 °F (36.9 °C) (04/27/18 1516)  Pulse: 100 (04/27/18 1516)  Resp: (!) 22 (04/27/18 1516)  BP: 135/68 (04/27/18 1516)  SpO2: (!) 90 % (04/27/18 1516) Vital Signs (24h Range):  Temp:  [97.8  °F (36.6 °C)-98.8 °F (37.1 °C)] 98.5 °F (36.9 °C)  Pulse:  [] 100  Resp:  [15-22] 22  SpO2:  [84 %-95 %] 90 %  BP: (126-135)/(61-68) 135/68     Weight: 81.8 kg (180 lb 5.4 oz)  Body mass index is 24.46 kg/m².    Physical Exam   Constitutional: He is oriented to person, place, and time. He appears well-developed and well-nourished.   HENT:   Head: Normocephalic and atraumatic.   Right Ear: External ear normal.   Left Ear: External ear normal.   Eyes: Conjunctivae are normal. Pupils are equal, round, and reactive to light.   Neck: Normal range of motion.   Cardiovascular: Normal rate and regular rhythm.    Pulmonary/Chest: Effort normal. No respiratory distress. He exhibits no tenderness.   Abdominal: Soft. Bowel sounds are normal. He exhibits no distension and no mass.   Musculoskeletal: He exhibits no edema or tenderness.   Neurological: He is alert and oriented to person, place, and time. He displays abnormal reflex. No cranial nerve deficit. He exhibits abnormal muscle tone.   Skin: Skin is warm and dry. No rash noted. No erythema. No pallor.   Psychiatric: He has a normal mood and affect. His behavior is normal. Judgment and thought content normal.   Nursing note and vitals reviewed.      Significant Labs:  CBC:   Recent Labs  Lab 04/26/18  1255   WBC 10.70   RBC 4.49*   HGB 14.0   HCT 42.4      MCV 95   MCH 31.1*   MCHC 32.9     CMP:   Recent Labs  Lab 04/26/18  1255   *   CALCIUM 9.0   ALBUMIN 2.8*   PROT 6.7   *   K 4.7   CO2 21*      BUN 13   CREATININE 0.7   ALKPHOS 82   ALT 58*   AST 49*   BILITOT 1.1*     Coagulation: No results for input(s): LABPROT, INR, APTT in the last 168 hours.    Recent Labs  Lab 04/26/18  1332   COLORU Yellow   SPECGRAV 1.020   PHUR 7.0   PROTEINUA Trace*   BACTERIA Moderate*   NITRITE Negative   LEUKOCYTESUR 3+*   UROBILINOGEN 1.0       Significant Diagnostics:  I have reviewed all pertinent imaging results/findings within the past 24  hours.    Assessment/Plan:     Non-intractable vomiting with nausea    Ileus vs early SBO.  Will keep on NG suction overnight and repeat KUB  May need SBS if no improvement  Dulcolax ordered by hospitalist.          VTE Risk Mitigation         Ordered     enoxaparin injection 40 mg  Daily      04/26/18 1628     IP VTE HIGH RISK PATIENT  Once      04/26/18 1628          Thank you for your consult. I will follow-up with patient. Please contact us if you have any additional questions.    Morales Peña MD  General Surgery  [facility]

## 2018-04-27 NOTE — SUBJECTIVE & OBJECTIVE
No current facility-administered medications on file prior to encounter.      Current Outpatient Prescriptions on File Prior to Encounter   Medication Sig    calcitonin, salmon, (FORTICAL) 200 unit/actuation nasal spray 1 spray by Nasal route once daily. Alternate nares.    cephALEXin (KEFLEX) 500 MG capsule Take 1 capsule (500 mg total) by mouth 4 (four) times daily.    tamsulosin (FLOMAX) 0.4 mg Cp24 Take 0.4 mg by mouth once daily.    triamterene-hydrochlorothiazide 37.5-25 mg (DYAZIDE) 37.5-25 mg per capsule Take 1 capsule by mouth every morning.       Review of patient's allergies indicates:  No Known Allergies    Past Medical History:   Diagnosis Date    Hepatitis C     Neck injury     Paraplegic spinal paralysis      History reviewed. No pertinent surgical history.  Family History     None        Social History Main Topics    Smoking status: Never Smoker    Smokeless tobacco: Never Used    Alcohol use No    Drug use: No    Sexual activity: No     Review of Systems   Constitutional: Negative for activity change, appetite change, chills, fatigue, fever and unexpected weight change.   HENT: Negative for congestion, dental problem, hearing loss, nosebleeds, sinus pressure, sore throat and voice change.    Eyes: Negative for pain and visual disturbance.   Respiratory: Negative for cough, chest tightness and shortness of breath.    Cardiovascular: Negative for chest pain, palpitations and leg swelling.   Gastrointestinal: Positive for abdominal distention, nausea and vomiting. Negative for abdominal pain, constipation and diarrhea.   Endocrine: Negative for cold intolerance and heat intolerance.   Genitourinary: Negative for difficulty urinating, flank pain, frequency and hematuria.   Musculoskeletal: Negative for arthralgias, back pain, gait problem, joint swelling, myalgias, neck pain and neck stiffness.   Skin: Negative for rash.   Allergic/Immunologic: Negative for immunocompromised state.    Neurological: Positive for weakness (paraplegia). Negative for dizziness, tremors, seizures, syncope, light-headedness, numbness and headaches.   Hematological: Negative for adenopathy. Does not bruise/bleed easily.   Psychiatric/Behavioral: Negative for confusion, decreased concentration, hallucinations, sleep disturbance and suicidal ideas. The patient is not nervous/anxious.      Objective:     Vital Signs (Most Recent):  Temp: 98.5 °F (36.9 °C) (04/27/18 1516)  Pulse: 100 (04/27/18 1516)  Resp: (!) 22 (04/27/18 1516)  BP: 135/68 (04/27/18 1516)  SpO2: (!) 90 % (04/27/18 1516) Vital Signs (24h Range):  Temp:  [97.8 °F (36.6 °C)-98.8 °F (37.1 °C)] 98.5 °F (36.9 °C)  Pulse:  [] 100  Resp:  [15-22] 22  SpO2:  [84 %-95 %] 90 %  BP: (126-135)/(61-68) 135/68     Weight: 81.8 kg (180 lb 5.4 oz)  Body mass index is 24.46 kg/m².    Physical Exam   Constitutional: He is oriented to person, place, and time. He appears well-developed and well-nourished.   HENT:   Head: Normocephalic and atraumatic.   Right Ear: External ear normal.   Left Ear: External ear normal.   Eyes: Conjunctivae are normal. Pupils are equal, round, and reactive to light.   Neck: Normal range of motion.   Cardiovascular: Normal rate and regular rhythm.    Pulmonary/Chest: Effort normal. No respiratory distress. He exhibits no tenderness.   Abdominal: Soft. Bowel sounds are normal. He exhibits no distension and no mass.   Musculoskeletal: He exhibits no edema or tenderness.   Neurological: He is alert and oriented to person, place, and time. He displays abnormal reflex. No cranial nerve deficit. He exhibits abnormal muscle tone.   Skin: Skin is warm and dry. No rash noted. No erythema. No pallor.   Psychiatric: He has a normal mood and affect. His behavior is normal. Judgment and thought content normal.   Nursing note and vitals reviewed.      Significant Labs:  CBC:   Recent Labs  Lab 04/26/18  1255   WBC 10.70   RBC 4.49*   HGB 14.0   HCT 42.4       MCV 95   MCH 31.1*   MCHC 32.9     CMP:   Recent Labs  Lab 04/26/18  1255   *   CALCIUM 9.0   ALBUMIN 2.8*   PROT 6.7   *   K 4.7   CO2 21*      BUN 13   CREATININE 0.7   ALKPHOS 82   ALT 58*   AST 49*   BILITOT 1.1*     Coagulation: No results for input(s): LABPROT, INR, APTT in the last 168 hours.    Recent Labs  Lab 04/26/18  1332   COLORU Yellow   SPECGRAV 1.020   PHUR 7.0   PROTEINUA Trace*   BACTERIA Moderate*   NITRITE Negative   LEUKOCYTESUR 3+*   UROBILINOGEN 1.0       Significant Diagnostics:  I have reviewed all pertinent imaging results/findings within the past 24 hours.

## 2018-04-28 NOTE — PLAN OF CARE
Problem: Patient Care Overview  Goal: Plan of Care Review  Outcome: Ongoing (interventions implemented as appropriate)  Patient AAO, VSS. IVF infusing as ordered.  No reports of pain or NV.  NG tube draining bile.  Patient remains NPO. Heels floated. Pt verbalized understanding of POC.  Purposeful rounding done during shift to promote patient safety. Patient free from falls and injury during shift.  Bed in lowest position, brakes locked, and call light within reach.  Will continue to monitor.

## 2018-04-28 NOTE — SUBJECTIVE & OBJECTIVE
Interval History: Feels well.  No abdominal pain.  NG draining 2500 since insertion - 800 cc last shift.  WBC 30,000!  No fever.    Medications:  Continuous Infusions:   sodium chloride 0.9% 125 mL/hr at 04/27/18 0345     Scheduled Meds:   ciprofloxacin (CIPRO)400mg/200ml D5W IVPB  400 mg Intravenous Q12H    docusate sodium  100 mg Oral BID    enoxaparin  40 mg Subcutaneous Daily    polyethylene glycol  17 g Oral BID    tamsulosin  0.4 mg Oral Daily     PRN Meds:aluminum-magnesium hydroxide-simethicone, bisacodyl, ondansetron, pneumoc 13-lopez conj-dip cr(PF)     Review of patient's allergies indicates:  No Known Allergies  Objective:     Vital Signs (Most Recent):  Temp: 98.7 °F (37.1 °C) (04/28/18 0725)  Pulse: 72 (04/28/18 0725)  Resp: 17 (04/28/18 0725)  BP: 128/68 (04/28/18 0725)  SpO2: (!) 90 % (04/28/18 0725) Vital Signs (24h Range):  Temp:  [98.5 °F (36.9 °C)-99.7 °F (37.6 °C)] 98.7 °F (37.1 °C)  Pulse:  [] 72  Resp:  [16-22] 17  SpO2:  [90 %-95 %] 90 %  BP: (120-135)/(60-68) 128/68     Weight: 81.8 kg (180 lb 5.4 oz)  Body mass index is 24.46 kg/m².    Intake/Output - Last 3 Shifts       04/26 0700 - 04/27 0659 04/27 0700 - 04/28 0659 04/28 0700 - 04/29 0659    P.O. 240  0    I.V. (mL/kg) 1402.1 (17.1)      IV Piggyback 200      Total Intake(mL/kg) 1842.1 (22.5)  0 (0)    Urine (mL/kg/hr) 1100  0 (0)    Emesis/NG output 50  0 (0)    Drains  1700 (0.9) 800 (3.6)    Other   0 (0)    Stool 0  0 (0)    Blood   0 (0)    Total Output 1150 1700 800    Net +692.1 -1700 -800           Urine Occurrence   2 x          Physical Exam   Constitutional: He is oriented to person, place, and time. He appears well-developed and well-nourished.   HENT:   Head: Normocephalic and atraumatic.   Right Ear: External ear normal.   Left Ear: External ear normal.   Eyes: Conjunctivae are normal. Pupils are equal, round, and reactive to light.   Neck: Normal range of motion.   Cardiovascular: Normal rate and regular rhythm.     Pulmonary/Chest: Effort normal. No respiratory distress. He exhibits no tenderness.   Abdominal: Soft. He exhibits no distension and no mass.   Musculoskeletal: He exhibits no edema or tenderness.   Neurological: He is alert and oriented to person, place, and time. He has normal reflexes. No cranial nerve deficit.   Skin: Skin is warm and dry. No rash noted. No erythema. No pallor.   Psychiatric: He has a normal mood and affect. His behavior is normal. Judgment and thought content normal.   Nursing note and vitals reviewed.      Significant Labs:  CBC:   Recent Labs  Lab 04/28/18  0434   WBC 30.90*   RBC 3.71*   HGB 11.7*   HCT 34.7*      MCV 93   MCH 31.7*   MCHC 33.9     CMP:   Recent Labs  Lab 04/28/18  0434   GLU 96   CALCIUM 8.4*   ALBUMIN 2.5*   PROT 5.9*      K 3.9   CO2 30*      BUN 17   CREATININE 0.8   ALKPHOS 71   ALT 39   AST 38   BILITOT 1.6*       Significant Diagnostics:  I have reviewed all pertinent imaging results/findings within the past 24 hours.

## 2018-04-28 NOTE — PLAN OF CARE
Problem: Patient Care Overview  Goal: Plan of Care Review  Outcome: Ongoing (interventions implemented as appropriate)  POC reviewed with pt, understanding verbalized. AAOX4. No c/o pain throughout shift. Assist x 1/Incontinent to a brief. O2 at 4L via NC. NPO. NG tube to Right Nare intact/Aspirated to confirm placement and connected to Low suction. CT of abdomen performed, awaiting results. Safety Maintained. Will continue to monitor.

## 2018-04-28 NOTE — NURSING
Contrast given Via NG tube. Aspiration of bile performed prior to administration. Tolerated well. Will continue to monitor.

## 2018-04-28 NOTE — PROGRESS NOTES
Progress Note  Hospital Medicine  Patient Name:Yamil Santiago Jr.  MRN:  [id number]  Patient Class: IP- Inpatient  Admit Date: 4/26/2018  Length of Stay: 1 days  Expected Discharge Date:   Attending Physician: Kimberly Sorensen MD  Primary Care Provider:  Tima Schuster MD    SUBJECTIVE:     Principal Problem: Vomiting for 2 days and dysuria  Initial history of present illness: Patient is a 89 y.o. male admitted to Hospitalist Service from [facility] with complaint of vomiting for 2 days and dysuria. 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 Part of the history obtained from patient's wife. Patient has been taking PO Keflex for UTI diagnosed 4 days ago.  presents to the ED with vomiting, constipation,frequent urination, decreased urination, decreased appetite with an onset x 1 day PTA. The patient reports that he was recently discharged from the hospital x 5 days ago for a UTI. Patient reports that he has been taking his antibiotics (500mg Keflex)  as directed, but believes they are making him feel badly. He states that he began vomiting yesterday. He denies nausea at this time .  He states that the vomit was clear. Patient states that he has not eaten in two days. He claims that he has not had a bowel movement in 2-3 days. Patient denies cough, swelling,hematuria, hematemesis, bright red blood in stool, or any other symptoms at this time. Family reports that they are concerned that the patient may have diabetes. They report that the patient has not seen his PCP since August 2017. No bed sores reported. Patient denied chest pain, shortness of breath, headache, vision changes, focal neuro-deficits, cough or fever.    Interval history   Patient seen and examined. Patient stable. No no complaints. Pending CT scan abdomen.     PMH/PSH/SH/FH/Meds: reviewed.    Symptoms/Review of Systems:     Diet:  NPO  Activity level: Quadriplegic  Pain:  Patient reports no pain.       OBJECTIVE:   Vital Signs (Most Recent):      Temp: 98.3 °F (36.8 °C) (04/28/18 1211)  Pulse: 69 (04/28/18 1211)  Resp: 18 (04/28/18 1211)  BP: 135/71 (04/28/18 1211)  SpO2: 98 % (04/28/18 1211)       Vital Signs Range (Last 24H):  Temp:  [98.3 °F (36.8 °C)-99.7 °F (37.6 °C)]   Pulse:  []   Resp:  [16-22]   BP: (120-135)/(60-71)   SpO2:  [90 %-98 %]     Weight: 81.8 kg (180 lb 5.4 oz)  Body mass index is 24.46 kg/m².    Intake/Output Summary (Last 24 hours) at 04/28/18 1225  Last data filed at 04/28/18 0700   Gross per 24 hour   Intake                0 ml   Output             2500 ml   Net            -2500 ml     Physical Examination:  General appearance: well developed, appears stated age  Head: normocephalic, atraumatic  Eyes:  conjunctivae/corneas clear. PERRL.  Nose: Nares normal. Septum midline.  Throat: lips, mucosa, and tongue normal; teeth and gums normal, no throat erythema.  Neck: supple, symmetrical, trachea midline, no JVD and thyroid not enlarged, symmetric, no tenderness/mass/nodules  Lungs:  clear to auscultation bilaterally and normal respiratory effort  Chest wall: no tenderness  Heart: regular rate and rhythm, S1, S2 normal, no murmur, click, rub or gallop  Abdomen: soft, non-tender non-distented; bowel sounds hypoactive; no masses,  no organomegaly  Extremities: no cyanosis, clubbing. 2+ edema.   Pulses: 2+ and symmetric  Skin: Skin color, texture, turgor normal. No rashes or lesions.  Lymph nodes: Cervical, supraclavicular, and axillary nodes normal.  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 CNII-XII intact.      CBC:    Recent Labs  Lab 04/21/18  2110 04/26/18  1255 04/28/18  0434   WBC 7.30 10.70 30.90*   RBC 4.03* 4.49* 3.71*   HGB 12.6* 14.0 11.7*   HCT 37.9* 42.4 34.7*    194 165   MCV 94 95 93   MCH 31.4* 31.1* 31.7*   MCHC 33.4 32.9 33.9   BMP    Recent  Labs  Lab 04/21/18  2110 04/26/18  1255 04/28/18  0434    113* 96   * 133* 138   K 4.7 4.7 3.9    102 101   CO2 27 21* 30*   BUN 12 13 17   CREATININE 0.8 0.7 0.8   CALCIUM 8.9 9.0 8.4*   MG  --   --  2.1      Diagnostic Results:  Microbiology Results (last 7 days)     Procedure Component Value Units Date/Time    Urine culture [[id number]] Collected:  04/26/18 1332    Order Status:  Completed Specimen:  Urine from Urine, Catheterized Updated:  04/28/18 0004     Urine Culture, Routine No growth    Narrative:       Cath if necessary    Urine culture [[id number]] Collected:  04/26/18 1332    Order Status:  Canceled Specimen:  Urine from Urine, Catheterized Updated:  04/26/18 1638         CT abdomen and pelvis with contrast: Probable adynamic ileus with all the small bowel loops containing fluid but without marked distention seen.  The colon contains stool but is not distended.  Severe prostate hypertrophy.  Bilateral small pleural effusions right greater than left and trace ascites around the liver    Assessment/Plan:      Non-intractable vomiting with nausea [R11.2]  Small Bowel Obstruction  Constipation  NPO now. Place NGT with LIS. Consult general surgeon  Follow up CT abdomen per general surgery    IV zosyn incase of abdominal infection    Yes    Malnutrition of moderate degree [E44.0]  Nutrition consulted. Encourage maximal PO intake. Diet supplementation ordered per nutrition approval. Will encourage PO and monitor closely for weight changes.      Yes    Hepatitis C [B19.20]  Trend LFTs.     UTI  Urine C/S. Continue IV Cipro.      Yes    CAD (coronary artery disease) [I25.10]  Patient with known CAD and monitor for S/Sx of angina/ACS. Continue to monitor on telemetry.      Yes    History of hypertension [Z86.79]  Chronic problem. Will continue chronic medications and monitor for any changes, adjusting as needed.      Not Applicable    Paraplegia [G82.20]  Supportive care.  Fall  precautions.  Skin care.   Yes          DVT prophylaxis: Lovenox 40 mg SQ q day.     Discussed with patient's wife.    VTE Risk Mitigation         Ordered     enoxaparin injection 40 mg  Daily      04/26/18 1628     IP VTE HIGH RISK PATIENT  Once      04/26/18 1628        Domingo Weiner MD  Department of Hospital Medicine   [facility]

## 2018-04-28 NOTE — ASSESSMENT & PLAN NOTE
Today's KUB somewhat improved.  Concerned about elevated WBC.  Will repeat CT with oral contrast to evaluate for blockage or bowel ischemia.

## 2018-04-28 NOTE — PROGRESS NOTES
[facility] Surgery  Progress Note    Subjective:     History of Present Illness:  Patient is a 89 y.o. male admitted to Hospitalist Service from [facility] Emergency Room with complaint of vomiting for 2 days and dysuria. 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 Part of the history obtained from patient's wife. Patient has been taking PO Keflex for UTI diagnosed 4 days ago.  presents to the ED with vomiting, constipation,frequent urination, decreased urination, decreased appetite with an onset x 1 day PTA. The patient reports that he was recently discharged from the hospital x 5 days ago for a UTI. Patient reports that he has been taking his antibiotics (500mg Keflex)  as directed, but believes they are making him feel badly. He states that he began vomiting yesterday. He denies nausea at this time .  He states that the vomit was clear. Patient states that he has not eaten in two days. He claims that he has not had a bowel movement in 2-3 days. Patient denies cough, swelling,hematuria, hematemesis, bright red blood in stool, or any other symptoms at this time.  Family reports that they are concerned that the patient may have diabetes. They report that the patient has not seen his PCP since August 2017. No bed sores reported. Patient denied chest pain, shortness of breath, headache, vision changes, focal neuro-deficits, cough or fever.  CT showed:  FINDINGS:  There are bilateral small pleural effusions right greater than left and moderate atelectasis at both lung bases.    The liver is of normal size and CT density.  A trace ascites is noted adjacent to the liver.  A gallbladder is not seen.  The pancreas is atrophied without mass or edema.  The spleen is of normal size and CT density as well.    The adrenal glands are not enlarged.  The kidneys are of normal  size contour and contrast enhancement.  The abdominal aorta and inferior vena cava are of normal caliber.    All the small bowel loops contain liquid but are not markedly distended.  The colon contains stool and is not dilated on today's study.  No free fluid or free air is seen.    In the pelvis there is marked prostate hypertrophy.  The bladder is otherwise of normal size and wall thickness.   Impression       Probable adynamic ileus with all the small bowel loops containing fluid but without marked distention seen.  The colon contains stool but is not distended.  Severe prostate hypertrophy.  Bilateral small pleural effusions right greater than left and trace ascites around the liver       Patient had NG tube placed today, but not in good position.  Replaced by me with good return of stomach contents (1000cc)       Post-Op Info:  * No surgery found *         Interval History: Feels well.  No abdominal pain.  NG draining 2500 since insertion - 800 cc last shift.  WBC 30,000!  No fever.    Medications:  Continuous Infusions:   sodium chloride 0.9% 125 mL/hr at 04/27/18 0345     Scheduled Meds:   ciprofloxacin (CIPRO)400mg/200ml D5W IVPB  400 mg Intravenous Q12H    docusate sodium  100 mg Oral BID    enoxaparin  40 mg Subcutaneous Daily    polyethylene glycol  17 g Oral BID    tamsulosin  0.4 mg Oral Daily     PRN Meds:aluminum-magnesium hydroxide-simethicone, bisacodyl, ondansetron, pneumoc 13-lopez conj-dip cr(PF)     Review of patient's allergies indicates:  No Known Allergies  Objective:     Vital Signs (Most Recent):  Temp: 98.7 °F (37.1 °C) (04/28/18 0725)  Pulse: 72 (04/28/18 0725)  Resp: 17 (04/28/18 0725)  BP: 128/68 (04/28/18 0725)  SpO2: (!) 90 % (04/28/18 0725) Vital Signs (24h Range):  Temp:  [98.5 °F (36.9 °C)-99.7 °F (37.6 °C)] 98.7 °F (37.1 °C)  Pulse:  [] 72  Resp:  [16-22] 17  SpO2:  [90 %-95 %] 90 %  BP: (120-135)/(60-68) 128/68     Weight: 81.8 kg (180 lb 5.4 oz)  Body mass index is  24.46 kg/m².    Intake/Output - Last 3 Shifts       04/26 0700 - 04/27 0659 04/27 0700 - 04/28 0659 04/28 0700 - 04/29 0659    P.O. 240  0    I.V. (mL/kg) 1402.1 (17.1)      IV Piggyback 200      Total Intake(mL/kg) 1842.1 (22.5)  0 (0)    Urine (mL/kg/hr) 1100  0 (0)    Emesis/NG output 50  0 (0)    Drains  1700 (0.9) 800 (3.6)    Other   0 (0)    Stool 0  0 (0)    Blood   0 (0)    Total Output 1150 1700 800    Net +692.1 -1700 -800           Urine Occurrence   2 x          Physical Exam   Constitutional: He is oriented to person, place, and time. He appears well-developed and well-nourished.   HENT:   Head: Normocephalic and atraumatic.   Right Ear: External ear normal.   Left Ear: External ear normal.   Eyes: Conjunctivae are normal. Pupils are equal, round, and reactive to light.   Neck: Normal range of motion.   Cardiovascular: Normal rate and regular rhythm.    Pulmonary/Chest: Effort normal. No respiratory distress. He exhibits no tenderness.   Abdominal: Soft. He exhibits no distension and no mass.   Musculoskeletal: He exhibits no edema or tenderness.   Neurological: He is alert and oriented to person, place, and time. He has normal reflexes. No cranial nerve deficit.   Skin: Skin is warm and dry. No rash noted. No erythema. No pallor.   Psychiatric: He has a normal mood and affect. His behavior is normal. Judgment and thought content normal.   Nursing note and vitals reviewed.      Significant Labs:  CBC:   Recent Labs  Lab 04/28/18  0434   WBC 30.90*   RBC 3.71*   HGB 11.7*   HCT 34.7*      MCV 93   MCH 31.7*   MCHC 33.9     CMP:   Recent Labs  Lab 04/28/18  0434   GLU 96   CALCIUM 8.4*   ALBUMIN 2.5*   PROT 5.9*      K 3.9   CO2 30*      BUN 17   CREATININE 0.8   ALKPHOS 71   ALT 39   AST 38   BILITOT 1.6*       Significant Diagnostics:  I have reviewed all pertinent imaging results/findings within the past 24 hours.    Assessment/Plan:     SBO (small bowel obstruction)    Today's KUB  somewhat improved.  Concerned about elevated WBC.  Will repeat CT with oral contrast to evaluate for blockage or bowel ischemia.         Non-intractable vomiting with nausea    Ileus vs early SBO.  Will keep on NG suction overnight and repeat KUB  May need SBS if no improvement  Dulcolax ordered by hospitalist.            Morales Peña MD  General Surgery  [facility]

## 2018-04-28 NOTE — NURSING
Dr. Weiner notified of Abd CT results, ordered to call Dr. Peña. Dr. Peña notified of abd CT results revealing partial small bowel obstruction, and ordered to order KUB tomorrow and possible surgery, also to hold the lovenox.

## 2018-04-29 NOTE — PROGRESS NOTES
Progress Note  Hospital Medicine  Patient Name:Yamil Santiago Jr.  MRN:  [id number]  Patient Class: IP- Inpatient  Admit Date: 4/26/2018  Length of Stay: 2 days  Expected Discharge Date:   Attending Physician: Kimberly Sorensen MD  Primary Care Provider:  Tima Schuster MD    SUBJECTIVE:     Principal Problem: Vomiting for 2 days and dysuria  Initial history of present illness: Patient is a 89 y.o. male admitted to Hospitalist Service from [facility] Room with complaint of vomiting for 2 days and dysuria. 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 Part of the history obtained from patient's wife. Patient has been taking PO Keflex for UTI diagnosed 4 days ago.  presents to the ED with vomiting, constipation,frequent urination, decreased urination, decreased appetite with an onset x 1 day PTA. The patient reports that he was recently discharged from the hospital x 5 days ago for a UTI. Patient reports that he has been taking his antibiotics (500mg Keflex)  as directed, but believes they are making him feel badly. He states that he began vomiting yesterday. He denies nausea at this time .  He states that the vomit was clear. Patient states that he has not eaten in two days. He claims that he has not had a bowel movement in 2-3 days. Patient denies cough, swelling,hematuria, hematemesis, bright red blood in stool, or any other symptoms at this time. Family reports that they are concerned that the patient may have diabetes. They report that the patient has not seen his PCP since August 2017. No bed sores reported. Patient denied chest pain, shortness of breath, headache, vision changes, focal neuro-deficits, cough or fever.    Interval history   Patient sucessfully underwent exploratory laparoscopy. Extensive lysis of adhesions as well as release of SBO. Patient intubated post up. Will try to  extubate tonight. If not patient will go to ICU overnight.     PMH/PSH/SH/FH/Meds: reviewed.    Symptoms/Review of Systems:    - unable to attain patient intubated / sedated post op     OBJECTIVE:   Vital Signs (Most Recent):      Temp: 96.3 °F (35.7 °C) (04/29/18 1130)  Pulse: (!) 59 (04/29/18 1130)  Resp: 18 (04/29/18 1130)  BP: (!) 147/77 (04/29/18 1130)  SpO2: 100 % (04/29/18 1130)       Vital Signs Range (Last 24H):  Temp:  [96.3 °F (35.7 °C)-98.3 °F (36.8 °C)]   Pulse:  [59-67]   Resp:  [16-18]   BP: (123-147)/(67-77)   SpO2:  [93 %-100 %]     Weight: 81.8 kg (180 lb 5.4 oz)  Body mass index is 24.46 kg/m².    Intake/Output Summary (Last 24 hours) at 04/29/18 1444  Last data filed at 04/29/18 0546   Gross per 24 hour   Intake              550 ml   Output             1400 ml   Net             -850 ml     Physical Examination:  General appearance: intubated sedated   Head: normocephalic, atraumatic  Lungs:  clear to auscultation bilaterally   Heart: regular rate and rhythm, S1, S2 normal, no murmur, click, rub or gallop  Abdomen: soft, non-tender non-distented; bowel sounds hypoactive; no masses,  no organomegaly  Extremities: no cyanosis, clubbing. 2+ edema.   Pulses: 2+ and symmetric  Skin: Skin color, texture, turgor normal. No rashes or lesions.  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 CNII-XII intact.      CBC:    Recent Labs  Lab 04/26/18  1255 04/28/18  0434 04/29/18  0453   WBC 10.70 30.90* 20.40*   RBC 4.49* 3.71* 3.74*   HGB 14.0 11.7* 11.8*   HCT 42.4 34.7* 35.7*    165 171   MCV 95 93 95   MCH 31.1* 31.7* 31.5*   MCHC 32.9 33.9 33.1   BMP    Recent Labs  Lab 04/26/18  1255 04/28/18  0434 04/29/18  0453   * 96 88   * 138 139   K 4.7 3.9 3.7    101 101   CO2 21* 30* 32*   BUN 13 17 16   CREATININE 0.7 0.8 0.8   CALCIUM 9.0 8.4* 8.4*   MG  --  2.1 2.0      Diagnostic Results:  Microbiology Results (last 7 days)     Procedure Component  Value Units Date/Time    Urine culture [[id number]] Collected:  04/26/18 1332    Order Status:  Completed Specimen:  Urine from Urine, Catheterized Updated:  04/28/18 0004     Urine Culture, Routine No growth    Narrative:       Cath if necessary    Urine culture [[id number]] Collected:  04/26/18 1332    Order Status:  Canceled Specimen:  Urine from Urine, Catheterized Updated:  04/26/18 1638         CT abdomen and pelvis with contrast: Probable adynamic ileus with all the small bowel loops containing fluid but without marked distention seen.  The colon contains stool but is not distended.  Severe prostate hypertrophy.  Bilateral small pleural effusions right greater than left and trace ascites around the liver    Assessment/Plan:      Non-intractable vomiting with nausea [R11.2]  Small Bowel Obstruction  Constipation  NPO now. Place NGT with LIS. Consult general surgeon  Follow up CT abdomen per general surgery    IV zosyn incase of abdominal infection   Patient surgery today for SBO  Intubated / Sedated post op    Yes    Malnutrition of moderate degree [E44.0]  Nutrition consulted. Encourage maximal PO intake. Diet supplementation ordered per nutrition approval. Will encourage PO and monitor closely for weight changes.      Yes    Hepatitis C [B19.20]  Trend LFTs.     UTI  Urine C/S. Continue IV Cipro.      Yes    CAD (coronary artery disease) [I25.10]  Patient with known CAD and monitor for S/Sx of angina/ACS. Continue to monitor on telemetry.      Yes    History of hypertension [Z86.79]  Chronic problem. Will continue chronic medications and monitor for any changes, adjusting as needed.      Not Applicable    Paraplegia [G82.20]  Supportive care.  Fall precautions.  Skin care.   Yes       Hypocalcemia  -1 gram calcium gluconate     DVT prophylaxis: Lovenox 40 mg SQ q day.     Discussed with patient's wife.    VTE Risk Mitigation         Ordered     enoxaparin injection 40 mg  Daily      04/26/18 1628     IP  VTE HIGH RISK PATIENT  Once      04/26/18 6613        Domingo Weiner MD  Department of Hospital Medicine   [facility]

## 2018-04-29 NOTE — ANESTHESIA PROCEDURE NOTES
Arterial    Diagnosis: small bowel obstruction  Doctor requesting consult: Casey    Patient location during procedure: done in OR  Procedure start time: 4/29/2018 2:37 PM  Timeout: 4/29/2018 2:37 PM  Procedure end time: 4/29/2018 2:38 PM  Staffing  Anesthesiologist: CLOTILDE JOE  Performed: anesthesiologist   Anesthesiologist was present at the time of the procedure.  Preanesthetic Checklist  Completed: patient identified, site marked, surgical consent, pre-op evaluation, timeout performed, IV checked, risks and benefits discussed, monitors and equipment checked and anesthesia consent givenArterial  Skin Prep: chlorhexidine gluconate  Local Infiltration: lidocaine  Orientation: left  Location: radial  Catheter Size: 20 G  Catheter placement by Anatomical landmarks. Heme positive aspiration all ports.Insertion Attempts: 1  Assessment  Dressing: secured with tape and tegaderm  Patient: Tolerated well

## 2018-04-29 NOTE — PLAN OF CARE
Problem: Patient Care Overview  Goal: Plan of Care Review  Outcome: Ongoing (interventions implemented as appropriate)  Patient alert and oriented resting in bed. Denies pain or SOB. VSS, Pt in NAD.  Plan of care reviewed with patient. Verbalizes understanding.Call light in reach. Pt free from fall or injury. Pt incontinent, using brief.  NG is using intermittent suction.  Will continue to monitor.

## 2018-04-29 NOTE — ANESTHESIA PREPROCEDURE EVALUATION
04/29/2018  Yamil Santiago Jr. is a 89 y.o., male.    Anesthesia Evaluation    I have reviewed the Patient Summary Reports.    I have reviewed the Nursing Notes.   I have reviewed the Medications.     Review of Systems  Anesthesia Hx:  Denies Family Hx of Anesthesia complications.   Denies Personal Hx of Anesthesia complications.   Hematology/Oncology:         -- Anemia: Hematology Comments: hypoalbuminemia   Cardiovascular:   ECG has been reviewed. Septal MI on recent EKG   Pulmonary:  Pulmonary Normal    Renal/:  Renal/ Normal     Hepatic/GI:   Liver Disease, Hepatitis, C SBO/NGS   Neurological:   Cervical level quadraplegia   Endocrine:  Endocrine Normal        Physical Exam  General:  Malnutrition    Airway/Jaw/Neck:  Airway Findings: Mouth Opening: Normal Tongue: Normal  General Airway Assessment: Adult  Mallampati: II  TM Distance: Normal, at least 6 cm  Jaw/Neck Findings:  Neck ROM: Extension Decreased, Mild      Dental:  Dental Findings: Upper Dentures, Lower Dentures   Chest/Lungs:  Chest/Lungs Findings: Decreased Breath Sounds Bilateral     Heart/Vascular:  Heart Findings: Rate: Normal  Rhythm: Regular Rhythm  Sounds: Normal  Heart Murmur  Systolic Heart Murmur Description: L Upper Sternal Border  Systolic Heart Murmur Grade: Grade II  Diastolic Heart Murmur Description: L Upper Sternal Border  Diastolic Heart Murmur Grade II             Anesthesia Plan  Type of Anesthesia, risks & benefits discussed:  Anesthesia Type:  general  Patient's Preference:   Intra-op Monitoring Plan: arterial line  Intra-op Monitoring Plan Comments:   Post Op Pain Control Plan:   Post Op Pain Control Plan Comments:   Induction:   IV  Beta Blocker:  Patient is not currently on a Beta-Blocker (No further documentation required).       Informed Consent: Patient understands risks and agrees with Anesthesia plan.  Questions  answered. Anesthesia consent signed with patient.  ASA Score: 3     Day of Surgery Review of History & Physical:    H&P update referred to the surgeon.     Anesthesia Plan Notes: Discussed possible ICU and ventilator support post-op and invasive monitoring with Arterial line and CVL with patient and spouse who understand and wish to proceed.        Ready For Surgery From Anesthesia Perspective.

## 2018-04-29 NOTE — BRIEF OP NOTE
[facility]  Brief Operative Note    SUMMARY     Surgery Date: 4/29/2018     Surgeon(s) and Role:     * Morales Peña MD - Primary    Assisting Surgeon: None    Pre-op Diagnosis:  SBO (small bowel obstruction) [K56.609]    Post-op Diagnosis:  Post-Op Diagnosis Codes:     * SBO (small bowel obstruction) [K56.609]    Procedure(s) (LRB):  EXPLORATORY-LAPAROSCOPY AND LYSIS OF EXTENSIVE ADHESIONS WITH RELEASE OF SBO (justifying -22 modifier)    Anesthesia: General    Description of the findings of the procedure: Extensive adhesions with site of kinking in the RLQ    Estimated Blood Loss: 25 mL         Specimens:   Specimen (12h ago through future)    None

## 2018-04-29 NOTE — OR NURSING
Dr Castillo and rt at bedside discussed poc with pt and rt tried to wean off vent see anesthesia notewants to keep overnite  will transfer to icu bed 514   Pt eyes open able to communicate pain controlled 02 sat 100 on vent see rt setting s

## 2018-04-29 NOTE — PLAN OF CARE
04/28/18 1951   Patient Assessment/Suction   Level of Consciousness (AVPU) alert   PRE-TX-O2-ETCO2   O2 Device (Oxygen Therapy) nasal cannula   Flow (L/min) 3   Oxygen Concentration (%) 32   SpO2 97 %   Pulse Oximetry Type Intermittent   Pulse 67   Resp 18   Incentive Spirometer   $ Incentive Spirometer Charges done with encouragement   Administration (Incentive Spirometer) done with encouragement   Number of Repetitions (Incentive Spirometer) 10   Level (mL) (Incentive Spirometer) 750   Patient Tolerance good   Ready to Wean/Extubation Screen   FIO2<=50 (chart decimal) 0.32

## 2018-04-29 NOTE — TRANSFER OF CARE
Anesthesia Transfer of Care Note    Patient: Yamil Santiago Jr.    Procedure(s) Performed: Procedure(s) (LRB):  EXPLORATORY-LAPAROTOMY (N/A)    Patient location: PACU    Anesthesia Type: general    Transport from OR: Transported from OR on 2-3 L/min O2 by NC with adequate spontaneous ventilation. Transported from OR intubated on 100% O2 by AMBU with adequate controlled ventilation. Upon arrival to PACU/ICU, patient attached to ventilator and auscultated to confirm bilateral breath sounds and adequate TV. Continuous SpO2 monitoring in transport    Post pain: adequate analgesia    Post assessment: no apparent anesthetic complications and tolerated procedure well    Post vital signs: stable    Level of consciousness: sedated    Nausea/Vomiting: no nausea/vomiting    Complications: none    Transfer of care protocol was followed      Last vitals:   Visit Vitals  BP (!) 147/77 (BP Location: Left arm, Patient Position: Lying)   Pulse (!) 59   Temp 35.7 °C (96.3 °F) (Oral)   Resp 18   Ht 6' (1.829 m)   Wt 81.8 kg (180 lb 5.4 oz)   SpO2 100%   BMI 24.46 kg/m²

## 2018-04-29 NOTE — PROGRESS NOTES
04/29/18 0835   Patient Assessment/Suction   Level of Consciousness (AVPU) alert   PRE-TX-O2-ETCO2   O2 Device (Oxygen Therapy) nasal cannula   $ Is the patient on Low Flow Oxygen? Yes   Flow (L/min) 3   Incentive Spirometer   $ Incentive Spirometer Charges done with encouragement   Administration (Incentive Spirometer) done with encouragement   Number of Repetitions (Incentive Spirometer) 10   Level (mL) (Incentive Spirometer) 750   Patient Tolerance good

## 2018-04-29 NOTE — NURSING
Pt is requesting PRN for sleep, states has not been able to sleep past couple of days.  Possible surgery tomorrow, pt is NPO.  Notified VASILIY WYNN

## 2018-04-29 NOTE — ANESTHESIA POSTPROCEDURE EVALUATION
Anesthesia Post Evaluation    Patient: Yamil Santiago Jr.    Procedure(s) Performed: Procedure(s) (LRB):  EXPLORATORY-LAPAROTOMY (N/A)    Final Anesthesia Type: general  Patient location during evaluation: PACU  Patient participation: Yes- Able to Participate  Level of consciousness: awake and alert  Post-procedure vital signs: reviewed and stable  Pain management: adequate  Airway patency: patent  PONV status at discharge: No PONV  Anesthetic complications: no      Cardiovascular status: blood pressure returned to baseline  Respiratory status: intubated and ventilator  Hydration status: euvolemic  Follow-up not needed.        Visit Vitals  BP (!) 142/75   Pulse (!) 58   Temp 36.2 °C (97.2 °F) (Temporal)   Resp 16   Ht 6' (1.829 m)   Wt 81.8 kg (180 lb 5.4 oz)   SpO2 100%   BMI 24.46 kg/m²       Pain/Hadley Score: Pain Assessment Performed: Yes (4/29/2018  4:50 PM)  Presence of Pain: denies (4/29/2018  5:15 PM)  Hadley Score: 8 (4/29/2018  5:15 PM)

## 2018-04-30 NOTE — PROGRESS NOTES
Patient received from PACU Intubated and on vent. Connected to ICU monitor . Patient alert and nods heads to questions. Able to squeeze with hands bilat weakly. N/G intact to LIS with scant amount brown drainage noted. Gonzales intact with ehsan urine noted. 175cc emptied by PACU nurse. 4 lap sites noted to abd with steristrips and bandaids intact. Scant amount drainage noted. Report received from Marsha.    1900Famikirill at bedside. Patient nodded head giving permission to give them information. Phone number for wife and angelica written on white board. Phone number provided for family to call and check on patient and password set up.

## 2018-04-30 NOTE — PLAN OF CARE
04/29/18 1930   Patient Assessment/Suction   Level of Consciousness (AVPU) responds to voice   Respiratory Effort Unlabored   Expansion/Accessory Muscles/Retractions no use of accessory muscles   All Lung Fields Breath Sounds diminished   Rhythm/Pattern, Respiratory artificial airway;mechanical device;ventilator assisted   PRE-TX-O2-ETCO2   O2 Device (Oxygen Therapy) ventilator   Oxygen Concentration (%) 50   SpO2 100 %   Pulse (!) 53   Resp 10   BP (!) 160/77       Airway - Non-Surgical 04/29/18 1442 Endotracheal Tube   Placement Date/Time: 04/29/18 1442   Present Prior to Hospital Arrival?: No  Method of Intubation: Direct laryngoscopy  Inserted by: CRNA  Airway Device: Endotracheal Tube  Mask Ventilation: Easy with oral airway  Intubated: Postinduction  Blade: Mill...   Secured at 22 cm   Measured At Lips   Secured Location Right   Secured by Silk tape   Bite Block none   Site Condition Dry   Status Intact;Secured;Patent   Vent Select   Conventional Vent Y   Charged w/in last 24h YES   Preset Conventional Ventilator Settings   Vent Type    Ventilation Type VC   Vent Mode SIMV   Humidity HME   Set Rate 8 bmp   Vt Set 600 mL   PEEP/CPAP 5 cmH20   Pressure Support 10 cmH20   Waveform RAMP   Peak Flow 70 L/min   Set Inspiratory Pressure 0 cmH20   Insp Time 0 Sec(s)   Plateau Set/Insp. Hold (sec) 0   Insp Rise Time  50 %   Trigger Sensitivity Flow/I-Trigger 2 L/min   P High 0 cm H2O   P Low 0 cm H2O   T High 0 sec   T Low 0 sec   Patient Ventilator Parameters   Resp Rate Total 21 br/min   Peak Airway Pressure 15 cmH2O   Mean Airway Pressure 9.8 cmH20   Plateau Pressure 0 cmH20   Exhaled Vt 248 mL   Total Ve 8.15 mL   Spont Ve 3.29 L   I:E Ratio Measured 1:1.80   Conventional Ventilator Alarms   Alarms On Y   Ve High Alarm 15 L/min   Resp Rate High Alarm 70 br/min   Press High Alarm 40 cmH2O   Apnea Rate 10   Apnea Volume (mL) 800 mL   Apnea Oxygen Concentration  100   Apnea Flow Rate (L/min) 96   T Apnea 20  sec(s)   Pt stable on current vent settings. Vent to red outlet with alarms on and functioning. Ambu/flowmeter at [facility].

## 2018-04-30 NOTE — PROGRESS NOTES
04/30/18 0530   Patient Assessment/Suction   Level of Consciousness (AVPU) alert   Respiratory Effort Normal;Unlabored   Expansion/Accessory Muscles/Retractions no use of accessory muscles   PRE-TX-O2-ETCO2   O2 Device (Oxygen Therapy) nasal cannula w/ humidification   Flow (L/min) 3   Oxygen Concentration (%) 50   SpO2 100 %   Pulse (!) 53   Resp 17   /81   Patient Ventilator Parameters   Resp Rate Total 23 br/min   Ready to Wean Parameters   Extubated? Yes   Reason for Extubation Extubated   Ventilator Discontinued Yes   Pt extubated at this time and placed on 3Lpm NC. ERIN.

## 2018-04-30 NOTE — PLAN OF CARE
Problem: Patient Care Overview  Goal: Plan of Care Review  Outcome: Ongoing (interventions implemented as appropriate)  Awake and alert. Medicated x2 with dilaudid 0.1mg with relief. (HR 40-55) Denied further pain. NPO, NG to LIS with only 50cc output. Gonzales intact with ehsan to yellow urine with sediment noted. Generalized edema, greatest to lower ext and Rt foot/ankle > left. Extubated at 0530 this am without difficulty. O2 at 3 liters. Resp even and unlabored , diminished breath sounds. O2 Sats 100%. Patient alert and oriented. Pleasant and cooperative. Lap sites x 4 with  bandaid and steristrips intact. Scant drainage noted.

## 2018-04-30 NOTE — PROGRESS NOTES
[facility] Surgery  Progress Note    Subjective:     History of Present Illness:  Patient is a 89 y.o. male admitted to Hospitalist Service from [facility] Emergency Room with complaint of vomiting for 2 days and dysuria. 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 Part of the history obtained from patient's wife. Patient has been taking PO Keflex for UTI diagnosed 4 days ago.  presents to the ED with vomiting, constipation,frequent urination, decreased urination, decreased appetite with an onset x 1 day PTA. The patient reports that he was recently discharged from the hospital x 5 days ago for a UTI. Patient reports that he has been taking his antibiotics (500mg Keflex)  as directed, but believes they are making him feel badly. He states that he began vomiting yesterday. He denies nausea at this time .  He states that the vomit was clear. Patient states that he has not eaten in two days. He claims that he has not had a bowel movement in 2-3 days. Patient denies cough, swelling,hematuria, hematemesis, bright red blood in stool, or any other symptoms at this time.  Family reports that they are concerned that the patient may have diabetes. They report that the patient has not seen his PCP since August 2017. No bed sores reported. Patient denied chest pain, shortness of breath, headache, vision changes, focal neuro-deficits, cough or fever.  CT showed:  FINDINGS:  There are bilateral small pleural effusions right greater than left and moderate atelectasis at both lung bases.    The liver is of normal size and CT density.  A trace ascites is noted adjacent to the liver.  A gallbladder is not seen.  The pancreas is atrophied without mass or edema.  The spleen is of normal size and CT density as well.    The adrenal glands are not enlarged.  The kidneys are of normal  size contour and contrast enhancement.  The abdominal aorta and inferior vena cava are of normal caliber.    All the small bowel loops contain liquid but are not markedly distended.  The colon contains stool and is not dilated on today's study.  No free fluid or free air is seen.    In the pelvis there is marked prostate hypertrophy.  The bladder is otherwise of normal size and wall thickness.   Impression       Probable adynamic ileus with all the small bowel loops containing fluid but without marked distention seen.  The colon contains stool but is not distended.  Severe prostate hypertrophy.  Bilateral small pleural effusions right greater than left and trace ascites around the liver       Patient had NG tube placed today, but not in good position.  Replaced by me with good return of stomach contents (1000cc)       Post-Op Info:  Procedure(s) (LRB):  EXPLORATORY-LAPAROTOMY (N/A)   1 Day Post-Op     Interval History: POD 1.  Extubated, comortable.  Incisions clean.  NG only drained 50 cc    Medications:  Continuous Infusions:   amino acid 2.75% - dextrose 5% (CLINIMIX-E) solution with additives (1L provides 27.5 gm AA, 50 gm CHO (170 kcal/L dextrose), Na 35, K 30, Mg 5, Ca 4.5, Acetate 51, Cl 39, Phos 15) 100 mL/hr at 04/30/18 1052    electrolyte-S (pH 7.4) 75 mL/hr at 04/29/18 1650     Scheduled Meds:   ciprofloxacin (CIPRO)400mg/200ml D5W IVPB  400 mg Intravenous Q12H    docusate sodium  100 mg Oral BID    enoxaparin  40 mg Subcutaneous Daily    piperacillin-tazobactam (ZOSYN) IVPB  3.375 g Intravenous Q6H    polyethylene glycol  17 g Oral BID    tamsulosin  0.4 mg Oral Daily     PRN Meds:aluminum-magnesium hydroxide-simethicone, bisacodyl, HYDROmorphone, ondansetron, pneumoc 13-lopez conj-dip cr(PF), ramelteon, sodium chloride 0.9%     Review of patient's allergies indicates:  No Known Allergies  Objective:     Vital Signs (Most Recent):  Temp: 97 °F (36.1 °C) (04/30/18 0800)  Pulse: (!) 57 (04/30/18  1000)  Resp: 15 (04/30/18 1000)  BP: (!) 140/67 (04/30/18 1000)  SpO2: 100 % (04/30/18 1000) Vital Signs (24h Range):  Temp:  [97 °F (36.1 °C)-98.8 °F (37.1 °C)] 97 °F (36.1 °C)  Pulse:  [41-80] 57  Resp:  [8-22] 15  SpO2:  [100 %] 100 %  BP: (119-169)/() 140/67  Arterial Line BP: ()/(53-78) 151/64     Weight: 84.7 kg (186 lb 11.7 oz)  Body mass index is 25.33 kg/m².    Intake/Output - Last 3 Shifts       04/28 0700 - 04/29 0659 04/29 0700 - 04/30 0659 04/30 0700 - 05/01 0659    P.O. 0      I.V. (mL/kg)  2912.5 (34.4)     IV Piggyback 550 500 50    Total Intake(mL/kg) 550 (6.7) 3412.5 (40.3) 50 (0.6)    Urine (mL/kg/hr) 0 (0) 1078 (0.5) 175 (0.4)    Emesis/NG output 0 (0) 0 (0)     Drains 2200 (1.1) 350 (0.2) 10 (0)    Other 0 (0)      Stool 0 (0)      Blood 0 (0) 25 (0)     Total Output 2200 1453 185    Net -1650 +1959.5 -135           Urine Occurrence 6 x            Physical Exam   Constitutional: He is oriented to person, place, and time. He appears well-developed and well-nourished.   HENT:   Head: Normocephalic and atraumatic.   Right Ear: External ear normal.   Left Ear: External ear normal.   Eyes: Conjunctivae are normal. Pupils are equal, round, and reactive to light.   Neck: Normal range of motion.   Cardiovascular: Normal rate and regular rhythm.    Pulmonary/Chest: Effort normal. No respiratory distress. He exhibits no tenderness.   Abdominal: Soft. He exhibits no distension and no mass. Bowel sounds are decreased.   Musculoskeletal: He exhibits no edema or tenderness.   Neurological: He is alert and oriented to person, place, and time. He has normal reflexes. No cranial nerve deficit.   Skin: Skin is warm and dry. No rash noted. No erythema. No pallor.   Psychiatric: He has a normal mood and affect. His behavior is normal. Judgment and thought content normal.   Nursing note and vitals reviewed.      Significant Labs:  CBC:   Recent Labs  Lab 04/30/18  0320   WBC 14.80*   RBC 3.58*   HGB  11.3*   HCT 34.1*      MCV 95   MCH 31.6*   MCHC 33.2     CMP:   Recent Labs  Lab 04/30/18  0320   *   CALCIUM 7.9*   ALBUMIN 2.2*   PROT 5.1*      K 4.1   CO2 26      BUN 14   CREATININE 0.7   ALKPHOS 58   ALT 32   AST 33   BILITOT 1.2*       Significant Diagnostics:  I have reviewed all pertinent imaging results/findings within the past 24 hours.    Assessment/Plan:     SBO (small bowel obstruction)    POD 1.  Stable  Clamp NG   Transfer to floor.         Non-intractable vomiting with nausea    Ileus vs early SBO.  Will keep on NG suction overnight and repeat KUB  May need SBS if no improvement  Dulcolax ordered by hospitalist.            Morales Peña MD  General Surgery  [facility]

## 2018-04-30 NOTE — SUBJECTIVE & OBJECTIVE
Interval History: POD 1.  Extubated, comortable.  Incisions clean.  NG only drained 50 cc    Medications:  Continuous Infusions:   amino acid 2.75% - dextrose 5% (CLINIMIX-E) solution with additives (1L provides 27.5 gm AA, 50 gm CHO (170 kcal/L dextrose), Na 35, K 30, Mg 5, Ca 4.5, Acetate 51, Cl 39, Phos 15) 100 mL/hr at 04/30/18 1052    electrolyte-S (pH 7.4) 75 mL/hr at 04/29/18 1650     Scheduled Meds:   ciprofloxacin (CIPRO)400mg/200ml D5W IVPB  400 mg Intravenous Q12H    docusate sodium  100 mg Oral BID    enoxaparin  40 mg Subcutaneous Daily    piperacillin-tazobactam (ZOSYN) IVPB  3.375 g Intravenous Q6H    polyethylene glycol  17 g Oral BID    tamsulosin  0.4 mg Oral Daily     PRN Meds:aluminum-magnesium hydroxide-simethicone, bisacodyl, HYDROmorphone, ondansetron, pneumoc 13-lopez conj-dip cr(PF), ramelteon, sodium chloride 0.9%     Review of patient's allergies indicates:  No Known Allergies  Objective:     Vital Signs (Most Recent):  Temp: 97 °F (36.1 °C) (04/30/18 0800)  Pulse: (!) 57 (04/30/18 1000)  Resp: 15 (04/30/18 1000)  BP: (!) 140/67 (04/30/18 1000)  SpO2: 100 % (04/30/18 1000) Vital Signs (24h Range):  Temp:  [97 °F (36.1 °C)-98.8 °F (37.1 °C)] 97 °F (36.1 °C)  Pulse:  [41-80] 57  Resp:  [8-22] 15  SpO2:  [100 %] 100 %  BP: (119-169)/() 140/67  Arterial Line BP: ()/(53-78) 151/64     Weight: 84.7 kg (186 lb 11.7 oz)  Body mass index is 25.33 kg/m².    Intake/Output - Last 3 Shifts       04/28 0700 - 04/29 0659 04/29 0700 - 04/30 0659 04/30 0700 - 05/01 0659    P.O. 0      I.V. (mL/kg)  2912.5 (34.4)     IV Piggyback 550 500 50    Total Intake(mL/kg) 550 (6.7) 3412.5 (40.3) 50 (0.6)    Urine (mL/kg/hr) 0 (0) 1078 (0.5) 175 (0.4)    Emesis/NG output 0 (0) 0 (0)     Drains 2200 (1.1) 350 (0.2) 10 (0)    Other 0 (0)      Stool 0 (0)      Blood 0 (0) 25 (0)     Total Output 2200 1453 185    Net -1650 +1959.5 -135           Urine Occurrence 6 x            Physical Exam    Constitutional: He is oriented to person, place, and time. He appears well-developed and well-nourished.   HENT:   Head: Normocephalic and atraumatic.   Right Ear: External ear normal.   Left Ear: External ear normal.   Eyes: Conjunctivae are normal. Pupils are equal, round, and reactive to light.   Neck: Normal range of motion.   Cardiovascular: Normal rate and regular rhythm.    Pulmonary/Chest: Effort normal. No respiratory distress. He exhibits no tenderness.   Abdominal: Soft. He exhibits no distension and no mass. Bowel sounds are decreased.   Musculoskeletal: He exhibits no edema or tenderness.   Neurological: He is alert and oriented to person, place, and time. He has normal reflexes. No cranial nerve deficit.   Skin: Skin is warm and dry. No rash noted. No erythema. No pallor.   Psychiatric: He has a normal mood and affect. His behavior is normal. Judgment and thought content normal.   Nursing note and vitals reviewed.      Significant Labs:  CBC:   Recent Labs  Lab 04/30/18  0320   WBC 14.80*   RBC 3.58*   HGB 11.3*   HCT 34.1*      MCV 95   MCH 31.6*   MCHC 33.2     CMP:   Recent Labs  Lab 04/30/18  0320   *   CALCIUM 7.9*   ALBUMIN 2.2*   PROT 5.1*      K 4.1   CO2 26      BUN 14   CREATININE 0.7   ALKPHOS 58   ALT 32   AST 33   BILITOT 1.2*       Significant Diagnostics:  I have reviewed all pertinent imaging results/findings within the past 24 hours.

## 2018-04-30 NOTE — PLAN OF CARE
1905 transferred to icu at 1840 stable vs 02 sat 100 % on vent setting see rt note pain controlled a 3/10 pt nods alert and oriented with et tube in bandaids on abd x 4 intact with shadowing of blood noted on inside steristrips  abd soft ng to low intermit suction with br secretion noted  Art line intact in l wrist 20 g intact and running cipro as ordered afebrile grubbs emptied 175 cc concentrated urine b/p wnl dr chow released from pacu to icu for overnite care report to dipesh Alanis all pt belongings with pt in room wife has container with partials in her purse

## 2018-04-30 NOTE — PROGRESS NOTES
04/30/18 0826   Patient Assessment/Suction   Level of Consciousness (AVPU) alert   All Lung Fields Breath Sounds coarse;diminished   PRE-TX-O2-ETCO2   O2 Device (Oxygen Therapy) nasal cannula w/ humidification   Flow (L/min) 3   SpO2 100 %   Pulse Oximetry Type Continuous   Pulse (!) 50   Resp 13   Incentive Spirometer   $ Incentive Spirometer Charges done with encouragement   Administration (Incentive Spirometer) done with encouragement   Number of Repetitions (Incentive Spirometer) 10   Level (mL) (Incentive Spirometer) 1000   Patient Tolerance good

## 2018-04-30 NOTE — PLAN OF CARE
Dr tiwari at bs with pt  And rt decided to transfer pt to icu overnite icu called house supervisor called bed  Secured wife informed rt called to bag pt for transfer

## 2018-04-30 NOTE — NURSING
Report called to [facility] on the 3rd floor. Patient updated on transfer. Will transfer to floor shortly.

## 2018-04-30 NOTE — OP NOTE
DATE OF PROCEDURE:  04/29/2018.    PREOPERATIVE DIAGNOSIS:  Small bowel obstruction secondary to adhesions.    POSTOPERATIVE DIAGNOSIS:  Small bowel obstruction secondary to adhesions.    OPERATION:  Laparoscopic lysis of extensive adhesions with release of small   bowel obstruction.    SURGEON:  Morales Peña M.D.    ANESTHESIA:  General endotracheal.    PROCEDURE AND FINDINGS:  With the patient in supine position and satisfactory   general endotracheal anesthesia, the abdomen was prepped and draped in the usual   manner for surgery.  Initial laparoscopic access was performed in the right   upper quadrant using the Veress needle technique.  Skin incision was made and   Veress needle inserted in the abdomen in the standard manner.  The abdomen was   insufflated to 15 mmHg pressure with carbon dioxide and the Veress needle   replaced with a 5-mm laparoscopic trocar.  The laparoscope was introduced.    There were noted to be extensive adhesions to a right paramedian incision from   the patient's previous appendectomy.  Additional trocars were then placed up the   midline and right lateral in clear spots under direct vision.  Following this,   using the Harmonic scalpel as well as the EndoShears, multiple adhesions were   taken down from the omentum and the anterior abdominal wall.  In the area of the   umbilicus, a loop of small bowel was tented up to the peritoneum and this was   sharply taken down with the EndoShears with good hemostasis maintained by the   Harmonic scalpel away from the bowel where necessary.  This was taken down   slowly and carefully because of the dense adhesions.  Following this, all   anterior abdominal wall adhesions required clearing in order to evaluate the   distended small bowel.  Most of the small bowel was distended right into the   right lower quadrant as seen on previous CT scan.  These adhesions were then   addressed going from place to place, taking down the adhesions as they could be    lysed.  Again, as stated above, there were extensive adhesions and operative   time required was over an hour and a half of lysing adhesions.  At this point,   the terminal ileum came into view as well as the right colon and cecum.  The   ileocecal valve was identified and then traced proximally along the ileum.    There are sites of kinking of the terminal ileum as well as a small bowel loop   into the pelvis, which was brought out.  By carefully lysing all adhesions, the   kinking in the small bowel was released with apparent distention of the terminal   ileum.  Again, the bowel was examined in order to make sure that all adhesions   were lysed.  Additional sites of potential kinking were lysed.  The small bowel   was then traced from the terminal ileum throughout the ileum itself.  The upper   small bowel was moderately distended and could not be adequately examined,   however, it was felt that the site of obstruction was in the distal ileum and   this was addressed.  Hemostasis was very satisfactory.  The area was copiously   irrigated with saline and aspirated.  The bowel was again examined and was felt   to be nonobstructive.  All trocars were removed after desufflating the abdomen.    Skin incisions were closed with 4-0 Monocryl subcuticular sutures.  The wounds   were washed and dried.  Steri-Strips and sterile dressings were applied.  The   patient tolerated the procedure well and was sent to Recovery in stable   condition.    ESTIMATED BLOOD LOSS:  50 mL.    COMPLICATIONS:  None; however, as stated above, this was extensive lysis of   adhesions justifying a-22 modifier.    DRAINS:  None.    SPECIMENS:  None.      JEFF/IN  dd: 04/30/2018 12:04:07 (CD)  td: 04/30/2018 15:27:24 (CD)  Doc ID   #[id number]  Job ID #[id number]    CC:

## 2018-04-30 NOTE — NURSING
Pt to room via bed in no distress, vital signs taken, pt tele monitor 8710 in place in SB.  He has o2 at 3 liters on sat 98% lap sites times 4 dry and intact, bowel sounds hypoactive, NG right nare first number I see is 70 NG is clamped it was reported to me that he had 50 or less ml when residual was assessed.  He has moderate edema noted to lower extremites, pulses weak but present, heels elevated off bed with pillows, scd's placed.  He denies pain or discomfort at this time. oriented to room and call light he has his incentive spirometer in reach.  He denies need for anything else at this time, call light in reach.

## 2018-04-30 NOTE — PROGRESS NOTES
Called Dr. Peña to clarify orders. Hold Lovenox for tonight. Check with anesthesia regarding plans for vent. Keep N/G tube to LIS even if extubated.    Called Anna regarding plans for vent. Rest on vent overnight and evaluate for extubation in am.Informed of Bradacardia in the 40's with stable BP. HR 50's in PACU. May give Robinal 0.2 for bradycardia if needed.

## 2018-04-30 NOTE — PROGRESS NOTES
Progress Note  Hospital Medicine  Patient Name:Yamil Santiago Jr.  MRN:  [id number]  Patient Class: IP- Inpatient  Admit Date: 4/26/2018  Length of Stay: 3 days  Expected Discharge Date:   Attending Physician: Kimberly Sorensen MD  Primary Care Provider:  Tima Schuster MD    SUBJECTIVE:     Principal Problem: Vomiting for 2 days and dysuria  Initial history of present illness: Patient is a 89 y.o. male admitted to Hospitalist Service from [facility] Emergency Room with complaint of vomiting for 2 days and dysuria. 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 Part of the history obtained from patient's wife. Patient has been taking PO Keflex for UTI diagnosed 4 days ago.  presents to the ED with vomiting, constipation,frequent urination, decreased urination, decreased appetite with an onset x 1 day PTA. The patient reports that he was recently discharged from the hospital x 5 days ago for a UTI. Patient reports that he has been taking his antibiotics (500mg Keflex)  as directed, but believes they are making him feel badly. He states that he began vomiting yesterday. He denies nausea at this time .  He states that the vomit was clear. Patient states that he has not eaten in two days. He claims that he has not had a bowel movement in 2-3 days. Patient denies cough, swelling,hematuria, hematemesis, bright red blood in stool, or any other symptoms at this time. Family reports that they are concerned that the patient may have diabetes. They report that the patient has not seen his PCP since August 2017. No bed sores reported. Patient denied chest pain, shortness of breath, headache, vision changes, focal neuro-deficits, cough or fever.    PMH/PSH/SH/FH/Meds: reviewed.    Symptoms/Review of Systems: s/p exploratory laparotomy, adhesiolysis with extensive adhesions with release of SBO. In ICU, bradycardic.  Patient extubated today, NGT in place. No shortness of breath, cough, chest pain or headache, fever or abdominal pain.     Diet:  NPO. Start Clinimix.  Activity level: Quadriplegic  Pain:  Patient reports no pain.       OBJECTIVE:   Vital Signs (Most Recent):      Temp: 97 °F (36.1 °C) (04/30/18 0800)  Pulse: (!) 44 (04/30/18 0800)  Resp: 15 (04/30/18 0800)  BP: (!) 160/126 (04/30/18 0800)  SpO2: 100 % (04/30/18 0800)       Vital Signs Range (Last 24H):  Temp:  [96.3 °F (35.7 °C)-98.8 °F (37.1 °C)]   Pulse:  [41-80]   Resp:  [8-22]   BP: (119-169)/()   SpO2:  [100 %]   Arterial Line BP: ()/(53-78)     Weight: 84.7 kg (186 lb 11.7 oz)  Body mass index is 25.33 kg/m².    Intake/Output Summary (Last 24 hours) at 04/30/18 0830  Last data filed at 04/30/18 0600   Gross per 24 hour   Intake           3412.5 ml   Output             1453 ml   Net           1959.5 ml     Physical Examination:  General appearance: well developed, appears stated age  Head: normocephalic, atraumatic  Eyes:  conjunctivae/corneas clear. PERRL.  Nose: Nares normal. Septum midline.  Throat: lips, mucosa, and tongue normal; teeth and gums normal, no throat erythema.  Neck: supple, symmetrical, trachea midline, no JVD and thyroid not enlarged, symmetric, no tenderness/mass/nodules  Lungs:  clear to auscultation bilaterally and normal respiratory effort  Chest wall: no tenderness  Heart: regular rate and rhythm, S1, S2 normal, no murmur, click, rub or gallop  Abdomen: soft, non-tender non-distented; bowel sounds hypoactive; no masses,  no organomegaly. Abdominal dressings C/D/I.  Extremities: no cyanosis, clubbing. 2+ edema.   Pulses: 2+ and symmetric  Skin: Skin color, texture, turgor normal. No rashes or lesions.  Lymph nodes: Cervical, supraclavicular, and axillary nodes normal.  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 CNII-XII intact.      CBC:    Recent Labs  Lab 04/28/18  0434  04/29/18  0453 04/30/18  0320   WBC 30.90* 20.40* 14.80*   RBC 3.71* 3.74* 3.58*   HGB 11.7* 11.8* 11.3*   HCT 34.7* 35.7* 34.1*    171 169   MCV 93 95 95   MCH 31.7* 31.5* 31.6*   MCHC 33.9 33.1 33.2   BMP    Recent Labs  Lab 04/28/18  0434 04/29/18  0453 04/30/18  0320   GLU 96 88 113*    139 139   K 3.9 3.7 4.1    101 103   CO2 30* 32* 26   BUN 17 16 14   CREATININE 0.8 0.8 0.7   CALCIUM 8.4* 8.4* 7.9*   MG 2.1 2.0 1.9      Diagnostic Results:  Microbiology Results (last 7 days)     Procedure Component Value Units Date/Time    Urine culture [[id number]] Collected:  04/26/18 1332    Order Status:  Completed Specimen:  Urine from Urine, Catheterized Updated:  04/28/18 0004     Urine Culture, Routine No growth    Narrative:       Cath if necessary    Urine culture [[id number]] Collected:  04/26/18 1332    Order Status:  Canceled Specimen:  Urine from Urine, Catheterized Updated:  04/26/18 1638         CT abdomen and pelvis with contrast: Probable adynamic ileus with all the small bowel loops containing fluid but without marked distention seen.  The colon contains stool but is not distended.  Severe prostate hypertrophy.  Bilateral small pleural effusions right greater than left and trace ascites around the liver    Assessment/Plan:      Small Bowel Obstruction s/p exploratory laparotomy, adhesiolysis with extensive adhesions with release of SBO  Continue to follow Dr. Peña's recommendations. Await Bowel functions resumption. Case discussed with him.  Needs aggressive incentive spirometry.  Follow hemoglobin and hematocrit closely.  Pain control with IV narcotics and antiemetics as needed.      Yes    Malnutrition of moderate degree [E44.0]  Diet supplementation to be given once PO intake starts. Will encourage PO and monitor closely for weight changes.      Yes    Hepatitis C [B19.20]  Trend LFTs.     UTI  Urine C/S - NTD. Continue IV Cipro.      Yes    CAD (coronary artery disease)  [I25.10]  Patient with known CAD and monitor for S/Sx of angina/ACS. Continue to monitor on telemetry.      Yes    History of hypertension [Z86.79]  Continue chronic medications and monitor for any changes, adjusting as needed.      Not Applicable    Paraplegia [G82.20]  Supportive care.  Fall precautions.  Skin care.   Yes          DVT prophylaxis: Lovenox 40 mg SQ q day.     Transfer to tele-floor.    Kimberly Sorensen MD  Department of Hospital Medicine   [facility]

## 2018-05-01 NOTE — PLAN OF CARE
Problem: Patient Care Overview  Goal: Plan of Care Review  Outcome: Ongoing (interventions implemented as appropriate)  Awake, alert, and oriented. Gonzales catheter patent, draining clear ehsan urine. IV antibiotics infusing per order. Clindmix infusing. VS stable. Remains afebrile throughout shift. Comfort level established. NG tube removed this shift. Patient tolerated well. Bed in low position, brakes locked, side rails up x 2, call light w/in reach. Verbalized understanding of POC. Open communication facilitated. Will continue to monitor.

## 2018-05-01 NOTE — PLAN OF CARE
Spoke with the pt at the bedside, verified that prior to coming into the hospital he was able to transfer himself into his WC or chair. Dr Sorensen ordered physical therapy for the pt....JOYCE Mitchell       05/01/18 3664   Discharge Reassessment   Assessment Type Discharge Planning Reassessment

## 2018-05-01 NOTE — PROGRESS NOTES
Progress Note  Hospital Medicine  Patient Name:Yamil Santiago Jr.  MRN:  [id number]  Patient Class: IP- Inpatient  Admit Date: 4/26/2018  Length of Stay: 4 days  Expected Discharge Date:   Attending Physician: Kimberly Sorensen MD  Primary Care Provider:  Tima Schuster MD    SUBJECTIVE:     Principal Problem: Vomiting for 2 days and dysuria  Initial history of present illness: Patient is a 89 y.o. male admitted to Hospitalist Service from [facility] Room with complaint of vomiting for 2 days and dysuria. 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 Part of the history obtained from patient's wife. Patient has been taking PO Keflex for UTI diagnosed 4 days ago.  presents to the ED with vomiting, constipation,frequent urination, decreased urination, decreased appetite with an onset x 1 day PTA. The patient reports that he was recently discharged from the hospital x 5 days ago for a UTI. Patient reports that he has been taking his antibiotics (500mg Keflex)  as directed, but believes they are making him feel badly. He states that he began vomiting yesterday. He denies nausea at this time .  He states that the vomit was clear. Patient states that he has not eaten in two days. He claims that he has not had a bowel movement in 2-3 days. Patient denies cough, swelling,hematuria, hematemesis, bright red blood in stool, or any other symptoms at this time. Family reports that they are concerned that the patient may have diabetes. They report that the patient has not seen his PCP since August 2017. No bed sores reported. Patient denied chest pain, shortness of breath, headache, vision changes, focal neuro-deficits, cough or fever.    PMH/PSH/SH/FH/Meds: reviewed.    Symptoms/Review of Systems: s/p exploratory laparotomy, adhesiolysis with extensive adhesions with release of SBO. In ICU, bradycardic.  Patient denied any BM or flatus. No shortness of breath, cough, chest pain or headache, fever or abdominal pain.     Diet:  NPO. On Clinimix.  Activity level: Quadriplegic  Pain:  Patient reports no pain.       OBJECTIVE:   Vital Signs (Most Recent):      Temp: 98.1 °F (36.7 °C) (05/01/18 0708)  Pulse: 60 (05/01/18 0708)  Resp: 16 (05/01/18 0708)  BP: 112/63 (05/01/18 0708)  SpO2: (!) 94 % (05/01/18 0708)       Vital Signs Range (Last 24H):  Temp:  [97.8 °F (36.6 °C)-98.6 °F (37 °C)]   Pulse:  [45-60]   Resp:  [7-20]   BP: (102-153)/(58-71)   SpO2:  [92 %-100 %]   Arterial Line BP: (151)/(64)     Weight: 84.7 kg (186 lb 11.7 oz)  Body mass index is 25.33 kg/m².    Intake/Output Summary (Last 24 hours) at 05/01/18 0903  Last data filed at 05/01/18 0615   Gross per 24 hour   Intake          1279.17 ml   Output              710 ml   Net           569.17 ml     Physical Examination:  General appearance: well developed, appears stated age  Head: normocephalic, atraumatic  Eyes:  conjunctivae/corneas clear. PERRL.  Nose: Nares normal. Septum midline.  Throat: lips, mucosa, and tongue normal; teeth and gums normal, no throat erythema.  Neck: supple, symmetrical, trachea midline, no JVD and thyroid not enlarged, symmetric, no tenderness/mass/nodules  Lungs:  clear to auscultation bilaterally and normal respiratory effort  Chest wall: no tenderness  Heart: regular rate and rhythm, S1, S2 normal, no murmur, click, rub or gallop  Abdomen: soft, non-tender non-distented; bowel sounds hypoactive; no masses,  no organomegaly. Abdominal dressings C/D/I.  Extremities: no cyanosis, clubbing. 2+ edema.   Pulses: 2+ and symmetric  Skin: Skin color, texture, turgor normal. No rashes or lesions.  Lymph nodes: Cervical, supraclavicular, and axillary nodes normal.  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 CNII-XII intact.      CBC:    Recent Labs  Lab 04/29/18  0453 04/30/18  0320 05/01/18  0543    WBC 20.40* 14.80* 14.60*   RBC 3.74* 3.58* 3.87*   HGB 11.8* 11.3* 12.2*   HCT 35.7* 34.1* 37.3*    169 180   MCV 95 95 96   MCH 31.5* 31.6* 31.5*   MCHC 33.1 33.2 32.6   BMP    Recent Labs  Lab 04/29/18  0453 04/30/18  0320 05/01/18  0543   GLU 88 113* 124*    139 137   K 3.7 4.1 4.0    103 102   CO2 32* 26 29   BUN 16 14 17   CREATININE 0.8 0.7 0.8   CALCIUM 8.4* 7.9* 8.2*   MG 2.0 1.9 1.9      Diagnostic Results:  Microbiology Results (last 7 days)     Procedure Component Value Units Date/Time    Urine culture [[id number]] Collected:  04/26/18 1332    Order Status:  Completed Specimen:  Urine from Urine, Catheterized Updated:  04/28/18 0004     Urine Culture, Routine No growth    Narrative:       Cath if necessary    Urine culture [[id number]] Collected:  04/26/18 1332    Order Status:  Canceled Specimen:  Urine from Urine, Catheterized Updated:  04/26/18 1638         CT abdomen and pelvis with contrast: Probable adynamic ileus with all the small bowel loops containing fluid but without marked distention seen.  The colon contains stool but is not distended.  Severe prostate hypertrophy.  Bilateral small pleural effusions right greater than left and trace ascites around the liver.    KUB: Appropriately positioned nasogastric tube.  Improved bowel gas pattern with no distinct loops of dilated small bowel evident on this exam.    Assessment/Plan:      Small Bowel Obstruction s/p exploratory laparotomy, adhesiolysis with extensive adhesions with release of SBO  Continue to follow Dr. Peña's recommendations. Await Bowel functions resumption. On IV Clinimix. PT to perform therapy. Patient is usally able to get himself tranferred from bed to chair.   Needs aggressive incentive spirometry.  Follow hemoglobin and hematocrit closely.  Pain control with PO narcotics and antiemetics as needed.      Yes    Malnutrition of moderate degree [E44.0]  Diet supplementation to be given once PO intake starts.  Will encourage PO and monitor closely for weight changes.      Yes    Hepatitis C [B19.20]  Trend LFTs.     UTI  Urine C/S - NTD. Continue IV Cipro.      Yes    CAD (coronary artery disease) [I25.10]  Patient with known CAD and monitor for S/Sx of angina/ACS. Continue to monitor on telemetry.      Yes    History of hypertension [Z86.79]  Continue chronic medications and monitor for any changes, adjusting as needed.      Not Applicable    Paraplegia [G82.20]  Supportive care.  Fall precautions.  Skin care.   Yes          DVT prophylaxis: Lovenox 40 mg SQ q day.     Kimberly Sorensen MD  Department of Hospital Medicine   [facility]

## 2018-05-01 NOTE — PLAN OF CARE
Problem: Patient Care Overview  Goal: Plan of Care Review  Outcome: Ongoing (interventions implemented as appropriate)  AAOx4. Gonzales in place, intact, draining clear yellow urine. No complaints of pain. IVF, clinimax, and atx infusing per order. Pt NPO. Tele maintained. PRN sleep aid admin, Pt did not sleep well. Q2 hour rounding utilized. Pain and comfort assessed. Bed low, brakes locked, SR up, call light within reach. POC reviewed. Pt verbalized understanding. Will continue to monitor.

## 2018-05-01 NOTE — SUBJECTIVE & OBJECTIVE
Interval History: POD 2.  Minimal NG output.  Thirsty    Medications:  Continuous Infusions:   amino acid 2.75% - dextrose 5% (CLINIMIX-E) solution with additives (1L provides 27.5 gm AA, 50 gm CHO (170 kcal/L dextrose), Na 35, K 30, Mg 5, Ca 4.5, Acetate 51, Cl 39, Phos 15)      electrolyte-S (pH 7.4) 75 mL/hr at 04/29/18 1650     Scheduled Meds:   ciprofloxacin (CIPRO)400mg/200ml D5W IVPB  400 mg Intravenous Q12H    docusate sodium  100 mg Oral BID    enoxaparin  40 mg Subcutaneous Daily    piperacillin-tazobactam (ZOSYN) IVPB  3.375 g Intravenous Q6H    polyethylene glycol  17 g Oral BID    tamsulosin  0.4 mg Oral Daily     PRN Meds:aluminum-magnesium hydroxide-simethicone, bisacodyl, HYDROmorphone, ondansetron, pneumoc 13-lopez conj-dip cr(PF), ramelteon, sodium chloride 0.9%     Review of patient's allergies indicates:  No Known Allergies  Objective:     Vital Signs (Most Recent):  Temp: 97.5 °F (36.4 °C) (05/01/18 1528)  Pulse: 67 (05/01/18 1528)  Resp: 16 (05/01/18 1528)  BP: (!) 102/57 (05/01/18 1528)  SpO2: (!) 92 % (05/01/18 1528) Vital Signs (24h Range):  Temp:  [97.4 °F (36.3 °C)-98.6 °F (37 °C)] 97.5 °F (36.4 °C)  Pulse:  [49-67] 67  Resp:  [14-20] 16  SpO2:  [92 %-100 %] 92 %  BP: (102-126)/(57-63) 102/57     Weight: 84.7 kg (186 lb 11.7 oz)  Body mass index is 25.33 kg/m².    Intake/Output - Last 3 Shifts       04/29 0700 - 04/30 0659 04/30 0700 - 05/01 0659 05/01 0700 - 05/02 0659    P.O.  0     I.V. (mL/kg) 2912.5 (34.4)      NG/GT  10     IV Piggyback 500 350     TPN  969.2     Total Intake(mL/kg) 3412.5 (40.3) 1329.2 (15.7)     Urine (mL/kg/hr) 1078 (0.5) 800 (0.4)     Emesis/NG output 0 (0)      Drains 350 (0.2) 10 (0)     Stool  0 (0)     Blood 25 (0)      Total Output 1453 810      Net +1959.5 +519.2                   Physical Exam   Constitutional: He is oriented to person, place, and time. He appears well-developed and well-nourished.   HENT:   Head: Normocephalic and atraumatic.    Right Ear: External ear normal.   Left Ear: External ear normal.   Eyes: Conjunctivae are normal. Pupils are equal, round, and reactive to light.   Neck: Normal range of motion.   Cardiovascular: Normal rate and regular rhythm.    Pulmonary/Chest: Effort normal. No respiratory distress. He exhibits no tenderness.   Abdominal: Soft. He exhibits no distension and no mass.   Musculoskeletal: He exhibits no edema or tenderness.   Neurological: He is alert and oriented to person, place, and time. He has normal reflexes. No cranial nerve deficit.   Skin: Skin is warm and dry. No rash noted. No erythema. No pallor.   Psychiatric: He has a normal mood and affect. His behavior is normal. Judgment and thought content normal.   Nursing note and vitals reviewed.      Significant Labs:  CBC:   Recent Labs  Lab 05/01/18  0543   WBC 14.60*   RBC 3.87*   HGB 12.2*   HCT 37.3*      MCV 96   MCH 31.5*   MCHC 32.6     CMP:   Recent Labs  Lab 05/01/18  0543   *   CALCIUM 8.2*   ALBUMIN 2.2*   PROT 5.4*      K 4.0   CO2 29      BUN 17   CREATININE 0.8   ALKPHOS 53*   ALT 34   AST 41*   BILITOT 1.1*       Significant Diagnostics:  I have reviewed all pertinent imaging results/findings within the past 24 hours.

## 2018-05-02 NOTE — ASSESSMENT & PLAN NOTE
Contributing Nutrition Diagnosis  Inadequate energy intake    Related to (etiology):   Acuity of illness    Signs and Symptoms (as evidenced by):   1) <50% estimated energy needs >5 days  2) Moderate muscle loss noted around clavicle  3) Moderate fat loss noted in upper arm, orbital  4) Hand , weak    Interventions/Recommendations (treatment strategy):  1) progress to soft/bland diet + Boost Plus with meals 2) If unable to progress, recommend increasing PPN to 125 mls/hr and add lipids    Nutrition Diagnosis Status:   New

## 2018-05-02 NOTE — PROGRESS NOTES
Progress Note  Hospital Medicine  Patient Name:Yamil Santiago Jr.  MRN:  [id number]  Patient Class: IP- Inpatient  Admit Date: 4/26/2018  Length of Stay: 5 days  Expected Discharge Date:   Attending Physician: Kimberly Sorensen MD  Primary Care Provider:  Tima Schuster MD    SUBJECTIVE:     Principal Problem: Vomiting for 2 days and dysuria  Initial history of present illness: Patient is a 89 y.o. male admitted to Hospitalist Service from [facility] Room with complaint of vomiting for 2 days and dysuria. 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 Part of the history obtained from patient's wife. Patient has been taking PO Keflex for UTI diagnosed 4 days ago.  presents to the ED with vomiting, constipation,frequent urination, decreased urination, decreased appetite with an onset x 1 day PTA. The patient reports that he was recently discharged from the hospital x 5 days ago for a UTI. Patient reports that he has been taking his antibiotics (500mg Keflex)  as directed, but believes they are making him feel badly. He states that he began vomiting yesterday. He denies nausea at this time .  He states that the vomit was clear. Patient states that he has not eaten in two days. He claims that he has not had a bowel movement in 2-3 days. Patient denies cough, swelling,hematuria, hematemesis, bright red blood in stool, or any other symptoms at this time. Family reports that they are concerned that the patient may have diabetes. They report that the patient has not seen his PCP since August 2017. No bed sores reported. Patient denied chest pain, shortness of breath, headache, vision changes, focal neuro-deficits, cough or fever.    PMH/PSH/SH/FH/Meds: reviewed.    Symptoms/Review of Systems: s/p exploratory laparotomy, adhesiolysis with extensive adhesions with release of SBO. Patient reports  flatus but no BM, tolerating clear liquids. No shortness of breath, cough, chest pain or headache, fever or abdominal pain.     Diet:  Clear liquids. On Clinimix.  Activity level: Quadriplegic  Pain:  Patient reports no pain.       OBJECTIVE:   Vital Signs (Most Recent):      Temp: 98 °F (36.7 °C) (05/02/18 0805)  Pulse: 60 (05/02/18 0805)  Resp: 20 (05/02/18 0805)  BP: 135/72 (05/02/18 0805)  SpO2: (!) 93 % (05/02/18 0805)       Vital Signs Range (Last 24H):  Temp:  [97 °F (36.1 °C)-98.2 °F (36.8 °C)]   Pulse:  [57-68]   Resp:  [14-20]   BP: (102-135)/(57-72)   SpO2:  [90 %-94 %]     Weight: 84.7 kg (186 lb 11.7 oz)  Body mass index is 25.33 kg/m².    Intake/Output Summary (Last 24 hours) at 05/02/18 0912  Last data filed at 05/02/18 0501   Gross per 24 hour   Intake          2176.67 ml   Output              850 ml   Net          1326.67 ml     Physical Examination:  General appearance: well developed, appears stated age  Head: normocephalic, atraumatic  Eyes:  conjunctivae/corneas clear. PERRL.  Nose: Nares normal. Septum midline.  Throat: lips, mucosa, and tongue normal; teeth and gums normal, no throat erythema.  Neck: supple, symmetrical, trachea midline, no JVD and thyroid not enlarged, symmetric, no tenderness/mass/nodules  Lungs:  clear to auscultation bilaterally and normal respiratory effort  Chest wall: no tenderness  Heart: regular rate and rhythm, S1, S2 normal, no murmur, click, rub or gallop  Abdomen: soft, non-tender non-distented; bowel sounds hypoactive; no masses,  no organomegaly. Abdominal dressings C/D/I.  Extremities: no cyanosis, clubbing. 2+ edema.   Pulses: 2+ and symmetric  Skin: Skin color, texture, turgor normal. No rashes or lesions.  Lymph nodes: Cervical, supraclavicular, and axillary nodes normal.  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 CNII-XII intact.      CBC:    Recent Labs  Lab 04/30/18  0320 05/01/18  0543 05/02/18  0510   WBC 14.80*  14.60* 12.40   RBC 3.58* 3.87* 3.77*   HGB 11.3* 12.2* 12.1*   HCT 34.1* 37.3* 35.6*    180 161   MCV 95 96 95   MCH 31.6* 31.5* 32.0*   MCHC 33.2 32.6 33.8   BMP    Recent Labs  Lab 04/30/18  0320 05/01/18  0543 05/02/18  0510   * 124* 127*    137 136   K 4.1 4.0 4.4    102 102   CO2 26 29 27   BUN 14 17 15   CREATININE 0.7 0.8 0.7   CALCIUM 7.9* 8.2* 8.0*   MG 1.9 1.9 1.9      Diagnostic Results:  Microbiology Results (last 7 days)     Procedure Component Value Units Date/Time    Urine culture [[id number]] Collected:  04/26/18 1332    Order Status:  Completed Specimen:  Urine from Urine, Catheterized Updated:  04/28/18 0004     Urine Culture, Routine No growth    Narrative:       Cath if necessary    Urine culture [[id number]] Collected:  04/26/18 1332    Order Status:  Canceled Specimen:  Urine from Urine, Catheterized Updated:  04/26/18 1638         CT abdomen and pelvis with contrast: Probable adynamic ileus with all the small bowel loops containing fluid but without marked distention seen.  The colon contains stool but is not distended.  Severe prostate hypertrophy.  Bilateral small pleural effusions right greater than left and trace ascites around the liver.    KUB: Appropriately positioned nasogastric tube.  Improved bowel gas pattern with no distinct loops of dilated small bowel evident on this exam.    Assessment/Plan:      Small Bowel Obstruction s/p exploratory laparotomy, adhesiolysis with extensive adhesions with release of SBO  Continue to follow Dr. Peña's recommendations. Await Bowel functions resumption. Discussed with Dr. Peña. On IV Clinimix. PT to perform therapy. Patient is usally able to get himself tranferred from bed to chair.   Needs aggressive incentive spirometry.  Follow hemoglobin and hematocrit closely.  Pain control with PO narcotics and antiemetics as needed.      Yes    Malnutrition of moderate degree [E44.0]  Diet supplementation to be given once PO intake  starts. Will encourage PO and monitor closely for weight changes.      Yes    Hepatitis C [B19.20]  Trend LFTs.     UTI  Urine C/S - NTD. Continue IV Cipro.      Yes    CAD (coronary artery disease) [I25.10]  Patient with known CAD and monitor for S/Sx of angina/ACS. Continue to monitor on telemetry.      Yes    History of hypertension [Z86.79]  Continue chronic medications and monitor for any changes, adjusting as needed.      Not Applicable    Paraplegia [G82.20]  Supportive care.  Fall precautions.  Skin care.   Yes          DVT prophylaxis: Lovenox 40 mg SQ q day.    Continue PT.     Kimberly Sorensen MD  Department of Hospital Medicine   [facility]

## 2018-05-02 NOTE — SUBJECTIVE & OBJECTIVE
Interval History: POD 3.  Dung clears.    Medications:  Continuous Infusions:   amino acid 2.75% - dextrose 5% (CLINIMIX-E) solution with additives (1L provides 27.5 gm AA, 50 gm CHO (170 kcal/L dextrose), Na 35, K 30, Mg 5, Ca 4.5, Acetate 51, Cl 39, Phos 15) 100 mL/hr at 05/01/18 1730    amino acid 2.75% - dextrose 5% (CLINIMIX-E) solution with additives (1L provides 27.5 gm AA, 50 gm CHO (170 kcal/L dextrose), Na 35, K 30, Mg 5, Ca 4.5, Acetate 51, Cl 39, Phos 15)       Scheduled Meds:   ciprofloxacin (CIPRO)400mg/200ml D5W IVPB  400 mg Intravenous Q12H    docusate sodium  100 mg Oral BID    enoxaparin  40 mg Subcutaneous Daily    piperacillin-tazobactam (ZOSYN) IVPB  3.375 g Intravenous Q6H    polyethylene glycol  17 g Oral BID    tamsulosin  0.4 mg Oral Daily     PRN Meds:aluminum-magnesium hydroxide-simethicone, bisacodyl, HYDROmorphone, ondansetron, pneumoc 13-lopez conj-dip cr(PF), ramelteon, sodium chloride 0.9%     Review of patient's allergies indicates:  No Known Allergies  Objective:     Vital Signs (Most Recent):  Temp: 98.3 °F (36.8 °C) (05/02/18 1152)  Pulse: 67 (05/02/18 1152)  Resp: 20 (05/02/18 1152)  BP: 105/64 (05/02/18 1152)  SpO2: (!) 92 % (05/02/18 1152) Vital Signs (24h Range):  Temp:  [97 °F (36.1 °C)-98.3 °F (36.8 °C)] 98.3 °F (36.8 °C)  Pulse:  [57-68] 67  Resp:  [16-20] 20  SpO2:  [90 %-94 %] 92 %  BP: (102-135)/(57-72) 105/64     Weight: 84.7 kg (186 lb 11.7 oz)  Body mass index is 25.33 kg/m².    Intake/Output - Last 3 Shifts       04/30 0700 - 05/01 0659 05/01 0700 - 05/02 0659 05/02 0700 - 05/03 0659    P.O. 0 440     I.V. (mL/kg)  900 (10.6)     NG/GT 10      IV Piggyback 350 600     .2 236.7     Total Intake(mL/kg) 1329.2 (15.7) 2176.7 (25.7)     Urine (mL/kg/hr) 800 (0.4) 850 (0.4)     Drains 10 (0)      Stool 0 (0)      Total Output 810 850      Net +519.2 +1326.7                   Physical Exam   Constitutional: He is oriented to person, place, and time. He appears  well-developed and well-nourished.   HENT:   Head: Normocephalic and atraumatic.   Right Ear: External ear normal.   Left Ear: External ear normal.   Eyes: Conjunctivae are normal. Pupils are equal, round, and reactive to light.   Neck: Normal range of motion.   Cardiovascular: Normal rate and regular rhythm.    Pulmonary/Chest: Effort normal. No respiratory distress. He exhibits no tenderness.   Abdominal: Soft. He exhibits no distension and no mass.   Musculoskeletal: He exhibits no edema or tenderness.   Neurological: He is alert and oriented to person, place, and time. He has normal reflexes. No cranial nerve deficit.   Skin: Skin is warm and dry. No rash noted. No erythema. No pallor.   Psychiatric: He has a normal mood and affect. His behavior is normal. Judgment and thought content normal.   Nursing note and vitals reviewed.      Significant Labs:  CBC:   Recent Labs  Lab 05/02/18  0510   WBC 12.40   RBC 3.77*   HGB 12.1*   HCT 35.6*      MCV 95   MCH 32.0*   MCHC 33.8     BMP:   Recent Labs  Lab 05/02/18  0510   *      K 4.4      CO2 27   BUN 15   CREATININE 0.7   CALCIUM 8.0*   MG 1.9       Significant Diagnostics:  I have reviewed all pertinent imaging results/findings within the past 24 hours.

## 2018-05-02 NOTE — PT/OT/SLP EVAL
Physical Therapy Evaluation    Patient Name:  Yamil Santiago Jr.   MRN:  [id number]    Recommendations:     Discharge Recommendations:  nursing facility, skilled   Discharge Equipment Recommendations: none   Barriers to discharge: Decreased caregiver support    Assessment:     Yamil Santiago Jr. is a 89 y.o. male admitted with a medical diagnosis of <principal problem not specified>.  He presents with the following impairments/functional limitations:  weakness, impaired endurance, impaired functional mobilty, impaired self care skills, impaired balance, decreased lower extremity function, decreased upper extremity function, pain, decreased ROM, impaired cardiopulmonary response to activity, edema . 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 Pt stated able to transfer from hospital bed to power chair at home. Pt currently experiencing hypotension with sitting EOB. Pt to benefit from continued therapies at SNF    Rehab Prognosis:  fair; patient would benefit from acute skilled PT services to address these deficits and reach maximum level of function.      Recent Surgery: Procedure(s) (LRB):  EXPLORATORY-LAPAROTOMY (N/A) 3 Days Post-Op    Plan:     During this hospitalization, patient to be seen 6 x/week to address the above listed problems via therapeutic activities, therapeutic exercises, wheelchair management/training  · Plan of Care Expires:  05/25/18   Plan of Care Reviewed with: patient    Subjective     Communicated with nurse Jaffe/Mariam prior to session.  Patient found supine upon PT entry to room, agreeable to evaluation.    Pt alert, interactive- stated able to transfer to power chair at home but some days would require more help due to poor truncal control  Pt with c/o R calf pain, CP and shoulder pain with dizziness while seated EOB  Pt c/o  SOB  Nurse Son came to assess pt- mask applied    Chief Complaint: dizziness while seated EOB  Patient comments/goals: get well  Pain/Comfort:  ·      Patients cultural, spiritual, [employer] conflicts given the current situation:      Living Environment:  Home with spouse who is also disabled  Prior to admission, patients level of function was hospital to scooter transfer with assist- pt with quadriparesis.  Patient has the following equipment: power chair.  DME owned (not currently used): .  Upon discharge, patient will have assistance from family.    Objective:     Patient found with: peripheral IV, SCD, oxygen, grubbs catheter     General Precautions: Standard, fall, respiratory   Orthopedic Precautions:N/A   Braces: N/A     Exams:  · RLE ROM: Deficits: decreased 50% , hypertonic  · RLE Strength: 0/5  · LLE ROM: Deficits: decreased 50%- hypertonic  · LLE Strength: 0/5    Functional Mobility:  · Bed Mobility:     · Rolling Left:  maximal assistance  · Scooting: total assistance  · Supine to Sit: maximal assistance and of 2 persons  · Sit to Supine: total assistance  · Transfers:     · Bed to Chair: unable to progress due to desaturation/hypotension with  assistance  using  unable  · Balance: poor static sitting balance    AM-PAC 6 CLICK MOBILITY  Total Score:8       Therapeutic Activities and Exercises:   thera ex with stretching/ passively done  EOB sitting assist x2 with hypotension/dizziness/ desaturation 93/65   85% on 4 liters, pt reclined. Kellen Cline applied mask with increased SAT 95% on 15 liters, /86  Pt repositioned head of bed, pillows under knees to offload heels    Patient left HOB elevated with all lines intact, call button in reach and nurse Alannah present.    GOALS:    Physical Therapy Goals        Problem: Physical Therapy Goal    Goal Priority Disciplines Outcome Goal Variances Interventions   Physical Therapy Goal     PT/OT, PT Ongoing (interventions implemented as  appropriate)     Description:  Goals to be met by: 2018     Patient will increase functional independence with mobility by performin. Supine to sit with Maximum Assistance  2. Bed to chair transfer with Maximum Assistance using anterior/posterior scooting or lateral technique  3. Sitting at edge of bed x20 minutes with Maximum Assistance  4. Lower extremity exercise program x20 reps passively/stretching                      History:     Past Medical History:   Diagnosis Date    Hepatitis C     Neck injury     Paraplegic spinal paralysis        History reviewed. No pertinent surgical history.    Clinical Decision Making:     History  Co-morbidities and personal factors that may impact the plan of care Examination  Body Structures and Functions, activity limitations and participation restrictions that may impact the plan of care Clinical Presentation   Decision Making/ Complexity Score   Co-morbidities:   [] Time since onset of injury / illness / exacerbation  [] Status of current condition  []Patient's cognitive status and safety concerns    [] Multiple Medical Problems (see med hx)  Personal Factors:   [] Patient's age  [] Prior Level of function   [] Patient's home situation (environment and family support)  [] Patient's level of motivation  [] Expected progression of patient      HISTORY:(criteria)    [] 09682 - no personal factors/history    [] 07807 - has 1-2 personal factor/comorbidity     [] 56618 - has >3 personal factor/comorbidity     Body Regions:  [] Objective examination findings  [] Head     []  Neck  [] Trunk   [] Upper Extremity  [] Lower Extremity    Body Systems:  [] For communication ability, affect, cognition, language, and learning style: the assessment of the ability to make needs known, consciousness, orientation (person, place, and time), expected emotional /behavioral responses, and learning preferences (eg, learning barriers, education  needs)  [] For the neuromuscular  system: a general assessment of gross coordinated movement (eg, balance, gait, locomotion, transfers, and transitions) and motor function  (motor control and motor learning)  [] For the musculoskeletal system: the assessment of gross symmetry, gross range of motion, gross strength, height, and weight  [] For the integumentary system: the assessment of pliability(texture), presence of scar formation, skin color, and skin integrity  [] For cardiovascular/pulmonary system: the assessment of heart rate, respiratory rate, blood pressure, and edema     Activity limitations:    [] Patient's cognitive status and saf ety concerns          [] Status of current condition      [] Weight bearing restriction  [] Cardiopulmunary Restriction    Participation Restrictions:   [] Goals and goal agreement with the patient     [] Rehab potential (prognosis) and probable outcome      Examination of Body System: (criteria)    [] 16899 - addressing 1-2 elements    [] 14103 - addressing a total of 3 or more elements     [] 07513 -  Addressing a total of 4 or more elements         Clinical Presentation: (criteria)  Choose one     On examination of body system using standardized tests and measures patient presents with (CHOOSE ONE) elements from any of the following: body structures and functions, activity limitations, and/or participation restrictions.  Leading to a clinical presentation that is considered (CHOOSE ONE)                              Clinical Decision Making  (Eval Complexity):  Choose One     Time Tracking:     PT Received On: 05/02/18  PT Start Time: 1009     PT Stop Time: 1052  PT Total Time (min): 43 min     Billable Minutes: Evaluation 10, Therapeutic Activity 23 and Therapeutic Exercise 10      Rafaela Pickens, PT  05/02/2018

## 2018-05-02 NOTE — PROGRESS NOTES
[facility] Surgery  Progress Note    Subjective:     History of Present Illness:  Patient is a 89 y.o. male admitted to Hospitalist Service from [facility] Emergency Room with complaint of vomiting for 2 days and dysuria. 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 Part of the history obtained from patient's wife. Patient has been taking PO Keflex for UTI diagnosed 4 days ago.  presents to the ED with vomiting, constipation,frequent urination, decreased urination, decreased appetite with an onset x 1 day PTA. The patient reports that he was recently discharged from the hospital x 5 days ago for a UTI. Patient reports that he has been taking his antibiotics (500mg Keflex)  as directed, but believes they are making him feel badly. He states that he began vomiting yesterday. He denies nausea at this time .  He states that the vomit was clear. Patient states that he has not eaten in two days. He claims that he has not had a bowel movement in 2-3 days. Patient denies cough, swelling,hematuria, hematemesis, bright red blood in stool, or any other symptoms at this time.  Family reports that they are concerned that the patient may have diabetes. They report that the patient has not seen his PCP since August 2017. No bed sores reported. Patient denied chest pain, shortness of breath, headache, vision changes, focal neuro-deficits, cough or fever.  CT showed:  FINDINGS:  There are bilateral small pleural effusions right greater than left and moderate atelectasis at both lung bases.    The liver is of normal size and CT density.  A trace ascites is noted adjacent to the liver.  A gallbladder is not seen.  The pancreas is atrophied without mass or edema.  The spleen is of normal size and CT density as well.    The adrenal glands are not enlarged.  The kidneys are of normal  size contour and contrast enhancement.  The abdominal aorta and inferior vena cava are of normal caliber.    All the small bowel loops contain liquid but are not markedly distended.  The colon contains stool and is not dilated on today's study.  No free fluid or free air is seen.    In the pelvis there is marked prostate hypertrophy.  The bladder is otherwise of normal size and wall thickness.   Impression       Probable adynamic ileus with all the small bowel loops containing fluid but without marked distention seen.  The colon contains stool but is not distended.  Severe prostate hypertrophy.  Bilateral small pleural effusions right greater than left and trace ascites around the liver       Patient had NG tube placed today, but not in good position.  Replaced by me with good return of stomach contents (1000cc)       Post-Op Info:  Procedure(s) (LRB):  EXPLORATORY-LAPAROTOMY (N/A)   3 Days Post-Op     Interval History: POD 3.  Dung clears.    Medications:  Continuous Infusions:   amino acid 2.75% - dextrose 5% (CLINIMIX-E) solution with additives (1L provides 27.5 gm AA, 50 gm CHO (170 kcal/L dextrose), Na 35, K 30, Mg 5, Ca 4.5, Acetate 51, Cl 39, Phos 15) 100 mL/hr at 05/01/18 1730    amino acid 2.75% - dextrose 5% (CLINIMIX-E) solution with additives (1L provides 27.5 gm AA, 50 gm CHO (170 kcal/L dextrose), Na 35, K 30, Mg 5, Ca 4.5, Acetate 51, Cl 39, Phos 15)       Scheduled Meds:   ciprofloxacin (CIPRO)400mg/200ml D5W IVPB  400 mg Intravenous Q12H    docusate sodium  100 mg Oral BID    enoxaparin  40 mg Subcutaneous Daily    piperacillin-tazobactam (ZOSYN) IVPB  3.375 g Intravenous Q6H    polyethylene glycol  17 g Oral BID    tamsulosin  0.4 mg Oral Daily     PRN Meds:aluminum-magnesium hydroxide-simethicone, bisacodyl, HYDROmorphone, ondansetron, pneumoc 13-lopez conj-dip cr(PF), ramelteon, sodium chloride 0.9%     Review of patient's allergies indicates:  No Known Allergies  Objective:     Vital Signs  (Most Recent):  Temp: 98.3 °F (36.8 °C) (05/02/18 1152)  Pulse: 67 (05/02/18 1152)  Resp: 20 (05/02/18 1152)  BP: 105/64 (05/02/18 1152)  SpO2: (!) 92 % (05/02/18 1152) Vital Signs (24h Range):  Temp:  [97 °F (36.1 °C)-98.3 °F (36.8 °C)] 98.3 °F (36.8 °C)  Pulse:  [57-68] 67  Resp:  [16-20] 20  SpO2:  [90 %-94 %] 92 %  BP: (102-135)/(57-72) 105/64     Weight: 84.7 kg (186 lb 11.7 oz)  Body mass index is 25.33 kg/m².    Intake/Output - Last 3 Shifts       04/30 0700 - 05/01 0659 05/01 0700 - 05/02 0659 05/02 0700 - 05/03 0659    P.O. 0 440     I.V. (mL/kg)  900 (10.6)     NG/GT 10      IV Piggyback 350 600     .2 236.7     Total Intake(mL/kg) 1329.2 (15.7) 2176.7 (25.7)     Urine (mL/kg/hr) 800 (0.4) 850 (0.4)     Drains 10 (0)      Stool 0 (0)      Total Output 810 850      Net +519.2 +1326.7                   Physical Exam   Constitutional: He is oriented to person, place, and time. He appears well-developed and well-nourished.   HENT:   Head: Normocephalic and atraumatic.   Right Ear: External ear normal.   Left Ear: External ear normal.   Eyes: Conjunctivae are normal. Pupils are equal, round, and reactive to light.   Neck: Normal range of motion.   Cardiovascular: Normal rate and regular rhythm.    Pulmonary/Chest: Effort normal. No respiratory distress. He exhibits no tenderness.   Abdominal: Soft. He exhibits no distension and no mass.   Musculoskeletal: He exhibits no edema or tenderness.   Neurological: He is alert and oriented to person, place, and time. He has normal reflexes. No cranial nerve deficit.   Skin: Skin is warm and dry. No rash noted. No erythema. No pallor.   Psychiatric: He has a normal mood and affect. His behavior is normal. Judgment and thought content normal.   Nursing note and vitals reviewed.      Significant Labs:  CBC:   Recent Labs  Lab 05/02/18  0510   WBC 12.40   RBC 3.77*   HGB 12.1*   HCT 35.6*      MCV 95   MCH 32.0*   MCHC 33.8     BMP:   Recent Labs  Lab  05/02/18  0510   *      K 4.4      CO2 27   BUN 15   CREATININE 0.7   CALCIUM 8.0*   MG 1.9       Significant Diagnostics:  I have reviewed all pertinent imaging results/findings within the past 24 hours.    Assessment/Plan:     SBO (small bowel obstruction)    Full liquids        Non-intractable vomiting with nausea    Ileus vs early SBO.  Will keep on NG suction overnight and repeat KUB  May need SBS if no improvement  Dulcolax ordered by hospitalist.            Morales Peña MD  General Surgery  [facility]

## 2018-05-02 NOTE — PLAN OF CARE
Problem: Nutrition, Imbalanced: Inadequate Oral Intake (Adult)  Goal: Improved Oral Intake  Patient will demonstrate the desired outcomes by discharge/transition of care.  Recommendation/Intervention: 1) progress to soft/bland diet per pt tolerance 2) Add Boost Plus with meals 3) If PPN continues, add lipids.   Goals: Pt will receive at least 85% EEN within 48 hrs.   Nutrition Goal Status: new  Communication of RD Recs:  (POC, sticky note)

## 2018-05-02 NOTE — PLAN OF CARE
Problem: Physical Therapy Goal  Goal: Physical Therapy Goal  Goals to be met by: 2018     Patient will increase functional independence with mobility by performin. Supine to sit with Maximum Assistance  2. Bed to chair transfer with Maximum Assistance using anterior/posterior scooting or lateral technique  3. Sitting at edge of bed x20 minutes with Maximum Assistance  4. Lower extremity exercise program x20 reps passively/stretching    Outcome: Ongoing (interventions implemented as appropriate)  PT eval and treat. Pt sat EOB assist x2 with onset of dizziness, CP, SOB with desaturation 85% and BP 93/65.- pt reclined, Nurse Gayle came to assess pt- mask don, O2 increased 15 liters SAT 96% /85

## 2018-05-02 NOTE — PROGRESS NOTES
[facility]  Adult Nutrition  Progress Note    SUMMARY       Recommendations    Recommendation/Intervention: 1) progress to soft/bland diet per pt tolerance 2) Add Boost Plus with meals 3) If PPN continues, add lipids.   Goals: Pt will receive at least 85% EEN within 48 hrs.   Nutrition Goal Status: new  Communication of RD Recs:  (POC, sticky note)    Reason for Assessment    Reason for Assessment: new TPN  1. Non-intractable vomiting with nausea, unspecified vomiting type    2. Ileus    3. SBO (small bowel obstruction)    4. Coronary artery disease, angina presence unspecified, unspecified vessel or lesion type, unspecified whether native or transplanted heart    5. Chronic hepatitis C without hepatic coma    6. Malnutrition of moderate degree      Past Medical History:   Diagnosis Date    Hepatitis C     Neck injury     Paraplegic spinal paralysis      General Information Comments: Admitted with vomiting x 2 days., constipation. Pt reports . Notes he can eat all of his clear liquids, but it takes a while. <50% estimated energy needs since 18.     Nutrition Risk Screen    Nutrition Risk Screen: no indicators present    Nutrition/Diet History    Patient Reported Diet/Restrictions/Preferences: general  Food Preferences: nno intolerances. does not use ONS at home  Do you have any cultural, spiritual, Anabaptist conflicts, given your current situation?: none  Food Allergies: NKFA    Anthropometrics    Temp: 98.3 °F (36.8 °C)  Height Method: Stated  Height: 6'  Height (inches): 72 in  Weight Method: Bed Scale  Weight: 84.7 kg (186 lb 11.7 oz)  Weight (lb): 186.73 lb  Ideal Body Weight (IBW), Male: 178 lb  % Ideal Body Weight, Male (lb): 104.9 lb  BMI (Calculated): 25.4  Usual Body Weight (UBW), k.9 kg (per chart review 18)  % Usual Body Weight: 101.16  % Weight Change From Usual Weight: 0.95 %       Lab/Procedures/Meds    Pertinent Labs Reviewed: reviewed  Lab Results    Component Value Date    ALBUMIN 2.0 (L) 2018     No results found for: CRP  Lab Results   Component Value Date    WBC 12.40 2018     Pertinent Medications Reviewed: reviewed  Scheduled Meds:   ciprofloxacin (CIPRO)400mg/200ml D5W IVPB  400 mg Intravenous Q12H    docusate sodium  100 mg Oral BID    enoxaparin  40 mg Subcutaneous Daily    piperacillin-tazobactam (ZOSYN) IVPB  3.375 g Intravenous Q6H    polyethylene glycol  17 g Oral BID    tamsulosin  0.4 mg Oral Daily     Continuous Infusions:   amino acid 2.75% - dextrose 5% (CLINIMIX-E) solution with additives (1L provides 27.5 gm AA, 50 gm CHO (170 kcal/L dextrose), Na 35, K 30, Mg 5, Ca 4.5, Acetate 51, Cl 39, Phos 15) 100 mL/hr at 18 1730    amino acid 2.75% - dextrose 5% (CLINIMIX-E) solution with additives (1L provides 27.5 gm AA, 50 gm CHO (170 kcal/L dextrose), Na 35, K 30, Mg 5, Ca 4.5, Acetate 51, Cl 39, Phos 15)         Physical Findings/Assessment    Overall Physical Appearance: loss of subcutaneous fat, loss of muscle mass  Oral/Mouth Cavity: WDL  Skin:  (Ammon score 12)    Estimated/Assessed Needs    Weight Used For Calorie Calculations: 84.7 kg (186 lb 11.7 oz)     Energy Need Method:  Kcal/kg  25-30 kcal/[id number]     Weight Used For Protein Calculations: 84.7 kg (186 lb 11.7 oz)   1.0-1.2 gm/kg/day:      Fluid Need Method: RDA Method     CHO Requirement: n/a      Nutrition Prescription Ordered    Current Diet Order: clear liquid    Evaluation of Received Nutrient/Fluid Intake    Oral Fluid (mL): 440  Energy Calories Required: not meeting needs  Protein Required: not meeting needs  Fluid Required: meeting needs    Intake/Output Summary (Last 24 hours) at 18 1216  Last data filed at 18 0501   Gross per 24 hour   Intake          2176.67 ml   Output              850 ml   Net          1326.67 ml     Tolerance: tolerating  % Intake of Estimated Energy Needs: 25 - 50 % (via TPN at 35% EEN and clear liquid  diet)  % Meal Intake: 25 - 50 %    Nutrition Risk    Level of Risk/Frequency of Follow-up:  (2 x wkly)     Assessment and Plan    Malnutrition of moderate degree    Contributing Nutrition Diagnosis  Inadequate energy intake    Related to (etiology):   Acuity of illness    Signs and Symptoms (as evidenced by):   1) <50% estimated energy needs >5 days  2) Moderate muscle loss noted around clavicle  3) Moderate fat loss noted in upper arm, orbital  4) Hand , weak    Interventions/Recommendations (treatment strategy):  1) progress to soft/bland diet + Boost Plus with meals 2) If unable to progress, recommend increasing PPN to 125 mls/hr and add lipids    Nutrition Diagnosis Status:   New               Monitor and Evaluation    Food and Nutrient Intake: energy intake  Food and Nutrient Adminstration: diet order  Anthropometric Measurements: weight, weight change  Biochemical Data, Medical Tests and Procedures: inflammatory profile  Nutrition-Focused Physical Findings: overall appearance, skin     Discharge Plan    Soft/bland with ONS for prn use

## 2018-05-02 NOTE — PLAN OF CARE
Problem: Patient Care Overview  Goal: Plan of Care Review  Outcome: Ongoing (interventions implemented as appropriate)  Plan of care reviewed with patient. Gonzales catheter intact & patent . Lap sites x3 intact with band aids. clinimix infusing as per orders. No complains of pain during night. Remains free from falls/injury. Instructed to call for assistance as needed during night. Verbalized understanding. Turned during night .

## 2018-05-02 NOTE — PLAN OF CARE
05/01/18 2050   Incentive Spirometer   $ Incentive Spirometer Charges other (see comments)  (Held; pt sleeping)   Administration (Incentive Spirometer) other (see comments)  (Held; pt sleeping)

## 2018-05-02 NOTE — PHYSICIAN QUERY
PT Name: Yamil Santiago Jr.  MR #: [id number]     Physician Query Form - Documentation Clarification      CDS/: Lavonne Katz RN, CCDS               Contact information:  [email or web address]          This form is a permanent document in the medical record.     Query Date: May 2, 2018    By submitting this query, we are merely seeking further clarification of documentation. Please utilize your independent clinical judgment when addressing the question(s) below.    The Medical record reflects the following:    Supporting Clinical Findings Location in Medical Record   CT Abdomen Pelvis With Contrast   There are features of a partial small bowel obstruction with a zone of transition involving the terminal ileum, proximal to which fecalized small bowel contents are noted.  This may be adhesive in origin.  Notably, there is a small ovoid intraluminal lipoma in the terminal ileum just proximal to the IC valve.  The zone of transition appears to be proximal to this, however.  The degree of moderate small bowel dilation is similar when compared to the CT from 04/26/2018.    Date of procedure:  04/29/2018.  Preoperative diagnosis:  Small bowel obstruction secondary to adhesions.  Postoperative diagnosis:  Small bowel obstruction secondary to adhesions.  Operation:  Laparoscopic lysis of extensive adhesions with release of small   bowel obstruction.   Radiology 04/28/18  1556                Op note 04/30                                                                                      Doctor, Please specify diagnosis or diagnoses associated with above clinical findings.  Please further specify small bowel obstruction secondary to adhesions.    Provider Use Only      [ x  ] Partial high grade    [   ] Other (Specify) ______________________                                                                                                                     [  ] Clinically undetermined

## 2018-05-02 NOTE — PROGRESS NOTES
[facility] Surgery  Progress Note    Subjective:     History of Present Illness:  Patient is a 89 y.o. male admitted to Hospitalist Service from [facility] Room with complaint of vomiting for 2 days and dysuria. 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 Part of the history obtained from patient's wife. Patient has been taking PO Keflex for UTI diagnosed 4 days ago.  presents to the ED with vomiting, constipation,frequent urination, decreased urination, decreased appetite with an onset x 1 day PTA. The patient reports that he was recently discharged from the hospital x 5 days ago for a UTI. Patient reports that he has been taking his antibiotics (500mg Keflex)  as directed, but believes they are making him feel badly. He states that he began vomiting yesterday. He denies nausea at this time .  He states that the vomit was clear. Patient states that he has not eaten in two days. He claims that he has not had a bowel movement in 2-3 days. Patient denies cough, swelling,hematuria, hematemesis, bright red blood in stool, or any other symptoms at this time.  Family reports that they are concerned that the patient may have diabetes. They report that the patient has not seen his PCP since August 2017. No bed sores reported. Patient denied chest pain, shortness of breath, headache, vision changes, focal neuro-deficits, cough or fever.  CT showed:  FINDINGS:  There are bilateral small pleural effusions right greater than left and moderate atelectasis at both lung bases.    The liver is of normal size and CT density.  A trace ascites is noted adjacent to the liver.  A gallbladder is not seen.  The pancreas is atrophied without mass or edema.  The spleen is of normal size and CT density as well.    The adrenal glands are not enlarged.  The kidneys are of normal  size contour and contrast enhancement.  The abdominal aorta and inferior vena cava are of normal caliber.    All the small bowel loops contain liquid but are not markedly distended.  The colon contains stool and is not dilated on today's study.  No free fluid or free air is seen.    In the pelvis there is marked prostate hypertrophy.  The bladder is otherwise of normal size and wall thickness.   Impression       Probable adynamic ileus with all the small bowel loops containing fluid but without marked distention seen.  The colon contains stool but is not distended.  Severe prostate hypertrophy.  Bilateral small pleural effusions right greater than left and trace ascites around the liver       Patient had NG tube placed today, but not in good position.  Replaced by me with good return of stomach contents (1000cc)       Post-Op Info:  Procedure(s) (LRB):  EXPLORATORY-LAPAROTOMY (N/A)   3 Days Post-Op     Interval History: POD 2.  Minimal NG output.  Thirsty    Medications:  Continuous Infusions:   amino acid 2.75% - dextrose 5% (CLINIMIX-E) solution with additives (1L provides 27.5 gm AA, 50 gm CHO (170 kcal/L dextrose), Na 35, K 30, Mg 5, Ca 4.5, Acetate 51, Cl 39, Phos 15)      electrolyte-S (pH 7.4) 75 mL/hr at 04/29/18 1650     Scheduled Meds:   ciprofloxacin (CIPRO)400mg/200ml D5W IVPB  400 mg Intravenous Q12H    docusate sodium  100 mg Oral BID    enoxaparin  40 mg Subcutaneous Daily    piperacillin-tazobactam (ZOSYN) IVPB  3.375 g Intravenous Q6H    polyethylene glycol  17 g Oral BID    tamsulosin  0.4 mg Oral Daily     PRN Meds:aluminum-magnesium hydroxide-simethicone, bisacodyl, HYDROmorphone, ondansetron, pneumoc 13-lopez conj-dip cr(PF), ramelteon, sodium chloride 0.9%     Review of patient's allergies indicates:  No Known Allergies  Objective:     Vital Signs (Most Recent):  Temp: 97.5 °F (36.4 °C) (05/01/18 1528)  Pulse: 67 (05/01/18 1528)  Resp: 16 (05/01/18 1528)  BP: (!) 102/57 (05/01/18  1528)  SpO2: (!) 92 % (05/01/18 1528) Vital Signs (24h Range):  Temp:  [97.4 °F (36.3 °C)-98.6 °F (37 °C)] 97.5 °F (36.4 °C)  Pulse:  [49-67] 67  Resp:  [14-20] 16  SpO2:  [92 %-100 %] 92 %  BP: (102-126)/(57-63) 102/57     Weight: 84.7 kg (186 lb 11.7 oz)  Body mass index is 25.33 kg/m².    Intake/Output - Last 3 Shifts       04/29 0700 - 04/30 0659 04/30 0700 - 05/01 0659 05/01 0700 - 05/02 0659    P.O.  0     I.V. (mL/kg) 2912.5 (34.4)      NG/GT  10     IV Piggyback 500 350     TPN  969.2     Total Intake(mL/kg) 3412.5 (40.3) 1329.2 (15.7)     Urine (mL/kg/hr) 1078 (0.5) 800 (0.4)     Emesis/NG output 0 (0)      Drains 350 (0.2) 10 (0)     Stool  0 (0)     Blood 25 (0)      Total Output 1453 810      Net +1959.5 +519.2                   Physical Exam   Constitutional: He is oriented to person, place, and time. He appears well-developed and well-nourished.   HENT:   Head: Normocephalic and atraumatic.   Right Ear: External ear normal.   Left Ear: External ear normal.   Eyes: Conjunctivae are normal. Pupils are equal, round, and reactive to light.   Neck: Normal range of motion.   Cardiovascular: Normal rate and regular rhythm.    Pulmonary/Chest: Effort normal. No respiratory distress. He exhibits no tenderness.   Abdominal: Soft. He exhibits no distension and no mass.   Musculoskeletal: He exhibits no edema or tenderness.   Neurological: He is alert and oriented to person, place, and time. He has normal reflexes. No cranial nerve deficit.   Skin: Skin is warm and dry. No rash noted. No erythema. No pallor.   Psychiatric: He has a normal mood and affect. His behavior is normal. Judgment and thought content normal.   Nursing note and vitals reviewed.      Significant Labs:  CBC:   Recent Labs  Lab 05/01/18  0543   WBC 14.60*   RBC 3.87*   HGB 12.2*   HCT 37.3*      MCV 96   MCH 31.5*   MCHC 32.6     CMP:   Recent Labs  Lab 05/01/18  0543   *   CALCIUM 8.2*   ALBUMIN 2.2*   PROT 5.4*      K 4.0    CO2 29      BUN 17   CREATININE 0.8   ALKPHOS 53*   ALT 34   AST 41*   BILITOT 1.1*       Significant Diagnostics:  I have reviewed all pertinent imaging results/findings within the past 24 hours.    Assessment/Plan:     SBO (small bowel obstruction)    DC NG  Clear fluids        Non-intractable vomiting with nausea    Ileus vs early SBO.  Will keep on NG suction overnight and repeat KUB  May need SBS if no improvement  Dulcolax ordered by hospitalist.            Morales Peña MD  General Surgery  [facility]

## 2018-05-03 NOTE — PHYSICIAN QUERY
PT Name: Yamil Santiago Jr.  MR #: [id number]     Physician Query Form - Documentation Clarification      CDS/: Lavonne Katz RN, CCDS               Contact information:  [email or web address]       This form is a permanent document in the medical record.     Query Date: May 3, 2018    By submitting this query, we are merely seeking further clarification of documentation. Please utilize your independent clinical judgment when addressing the question(s) below.    The Medical record reflects the following:    Supporting Clinical Findings Location in Medical Record   CT abdomen and pelvis with contrast: Probable adynamic ileus with all the small bowel loops containing fluid but without marked distention seen.  The colon contains stool but is not distended.  Severe prostate hypertrophy.  Bilateral small pleural effusions right greater than left and trace ascites around the liver    Non-intractable vomiting with nausea   Constipation  Paraplegia     Non-intractable vomiting with nausea   Small Bowel Obstruction  Constipation  NPO now. Place NGT with LIS. Consult general surgeon  Repeat KUB in AM.    Non-intractable vomiting with nausea  Ileus vs early SBO.  Will keep on NG suction overnight and repeat KUB  May need SBS if no improvement  Dulcolax ordered by hospitalist.    SBO (small bowel obstruction)  Today's KUB somewhat improved.  Concerned about elevated WBC.  Will repeat CT with oral contrast to evaluate for blockage or bowel ischemia.     Date of procedure:  04/29/2018.  Preoperative diagnosis:  Small bowel obstruction secondary to adhesions.   Postoperative diagnosis:  Small bowel obstruction secondary to adhesions.   Operation:  Laparoscopic lysis of extensive adhesions with release of small bowel obstruction.    POD 3.  Dung clears.  SBO (small bowel obstruction)  Full liquids    POD 4.  Vomited this AM.  KUB shows gas throughout small and large bowel consistent with post op ileus.  SBO (small bowel  obstruction)  NPO except ice chips  Add reglan H & P 04/26          H & P 04/26        Hospital Medicine progress note 04/27            General Surgery consult 04/27            General Surgery progress note 04/28        Op note 04/30 6:29 PM          General Surgery progress note 05/02 1:20 PM        General Surgery progress note 05/03 10:42 AM                                                                                      Doctor, Please specify diagnosis or diagnoses associated with above clinical findings.  Please clarify post op ileus.    Provider Use Only      [ x  ] Condition occurred in the postop period (not a complication of surgery)    [   ] Condition is a complication of surgery    [   ] Other intended meaning (specify) _____________                                                                                                                     [  ] Clinically undetermined

## 2018-05-03 NOTE — PROGRESS NOTES
Progress Note  Hospital Medicine  Patient Name:Yamil Santiago Jr.  MRN:  [id number]  Patient Class: IP- Inpatient  Admit Date: 4/26/2018  Length of Stay: 6 days  Expected Discharge Date:   Attending Physician: Kimberly Sorensen MD  Primary Care Provider:  Tima Schuster MD    SUBJECTIVE:     Principal Problem: Vomiting for 2 days and dysuria  Initial history of present illness: Patient is a 89 y.o. male admitted to Hospitalist Service from [facility] Room with complaint of vomiting for 2 days and dysuria. 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 Part of the history obtained from patient's wife. Patient has been taking PO Keflex for UTI diagnosed 4 days ago.  presents to the ED with vomiting, constipation,frequent urination, decreased urination, decreased appetite with an onset x 1 day PTA. The patient reports that he was recently discharged from the hospital x 5 days ago for a UTI. Patient reports that he has been taking his antibiotics (500mg Keflex)  as directed, but believes they are making him feel badly. He states that he began vomiting yesterday. He denies nausea at this time .  He states that the vomit was clear. Patient states that he has not eaten in two days. He claims that he has not had a bowel movement in 2-3 days. Patient denies cough, swelling,hematuria, hematemesis, bright red blood in stool, or any other symptoms at this time. Family reports that they are concerned that the patient may have diabetes. They report that the patient has not seen his PCP since August 2017. No bed sores reported. Patient denied chest pain, shortness of breath, headache, vision changes, focal neuro-deficits, cough or fever.    PMH/PSH/SH/FH/Meds: reviewed.    Symptoms/Review of Systems: s/p exploratory laparotomy, adhesiolysis with extensive adhesions with release of SBO. Patient vomited this  morning. No shortness of breath, cough, chest pain or headache, fever.     Diet:  Clear liquids. On Clinimix.  Activity level: Quadriplegic  Pain:  Patient reports no pain.       OBJECTIVE:   Vital Signs (Most Recent):      Temp: 98.3 °F (36.8 °C) (05/03/18 0733)  Pulse: 85 (05/03/18 0733)  Resp: 18 (05/03/18 0733)  BP: 116/67 (05/03/18 0733)  SpO2: (!) 91 % (05/03/18 0733)       Vital Signs Range (Last 24H):  Temp:  [98 °F (36.7 °C)-98.3 °F (36.8 °C)]   Pulse:  [62-85]   Resp:  [18-20]   BP: (105-127)/(63-69)   SpO2:  [90 %-97 %]     Weight: 84.7 kg (186 lb 11.7 oz)  Body mass index is 25.33 kg/m².    Intake/Output Summary (Last 24 hours) at 05/03/18 0925  Last data filed at 05/03/18 0553   Gross per 24 hour   Intake          2346.67 ml   Output              900 ml   Net          1446.67 ml     Physical Examination:  General appearance: well developed, appears stated age  Head: normocephalic, atraumatic  Eyes:  conjunctivae/corneas clear. PERRL.  Nose: Nares normal. Septum midline.  Throat: lips, mucosa, and tongue normal; teeth and gums normal, no throat erythema.  Neck: supple, symmetrical, trachea midline, no JVD and thyroid not enlarged, symmetric, no tenderness/mass/nodules  Lungs:  clear to auscultation bilaterally and normal respiratory effort  Chest wall: no tenderness  Heart: regular rate and rhythm, S1, S2 normal, no murmur, click, rub or gallop  Abdomen: soft, non-tender non-distented; bowel sounds hypoactive; no masses,  no organomegaly. Abdominal dressings C/D/I.  Extremities: no cyanosis, clubbing. 2+ edema.   Pulses: 2+ and symmetric  Skin: Skin color, texture, turgor normal. No rashes or lesions.  Lymph nodes: Cervical, supraclavicular, and axillary nodes normal.  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 CNII-XII intact.      CBC:    Recent Labs  Lab 05/01/18  0543 05/02/18  0510 05/03/18  0501   WBC 14.60* 12.40 14.40*   RBC 3.87* 3.77* 3.98*   HGB 12.2*  12.1* 12.6*   HCT 37.3* 35.6* 37.7*    161 195   MCV 96 95 95   MCH 31.5* 32.0* 31.6*   MCHC 32.6 33.8 33.3   BMP    Recent Labs  Lab 05/01/18  0543 05/02/18  0510 05/03/18  0501   * 127* 141*    136 133*   K 4.0 4.4 4.4    102 99   CO2 29 27 28   BUN 17 15 14   CREATININE 0.8 0.7 0.7   CALCIUM 8.2* 8.0* 8.3*   MG 1.9 1.9 1.9      Diagnostic Results:  Microbiology Results (last 7 days)     Procedure Component Value Units Date/Time    Urine culture [[id number]] Collected:  04/26/18 1332    Order Status:  Completed Specimen:  Urine from Urine, Catheterized Updated:  04/28/18 0004     Urine Culture, Routine No growth    Narrative:       Cath if necessary    Urine culture [[id number]] Collected:  04/26/18 1332    Order Status:  Canceled Specimen:  Urine from Urine, Catheterized Updated:  04/26/18 1638         CT abdomen and pelvis with contrast: Probable adynamic ileus with all the small bowel loops containing fluid but without marked distention seen.  The colon contains stool but is not distended.  Severe prostate hypertrophy.  Bilateral small pleural effusions right greater than left and trace ascites around the liver.    KUB: Appropriately positioned nasogastric tube.  Improved bowel gas pattern with no distinct loops of dilated small bowel evident on this exam.    KUB: NG tube removed and interval increase in the air-filled loops of bowel suggesting ileus.    Assessment/Plan:      Small Bowel Obstruction s/p exploratory laparotomy, adhesiolysis with extensive adhesions with release of SBO.  Post-op Ileus  Continue to follow Dr. Peña's recommendations. Await Bowel functions resumption. Started on Reglan. Discussed with Dr. Peña. On IV Clinimix.    Needs aggressive incentive spirometry.  Follow hemoglobin and hematocrit closely.  Pain control with PO narcotics and antiemetics as needed.      Yes    Malnutrition of moderate degree [E44.0]  Diet supplementation to be given once PO intake  starts. Will encourage PO and monitor closely for weight changes.      Yes    Hepatitis C [B19.20]  Trend LFTs.     UTI  Urine C/S - NTD. Continue IV Cipro.      Yes    CAD (coronary artery disease) [I25.10]  Patient with known CAD and monitor for S/Sx of angina/ACS. Continue to monitor on telemetry.      Yes    History of hypertension [Z86.79]  Continue chronic medications and monitor for any changes, adjusting as needed.      Not Applicable    Paraplegia [G82.20]  Supportive care.  Fall precautions.  Skin care.   Yes          DVT prophylaxis: Lovenox 40 mg SQ q day.    Continue PT.     Kimberly Sorensen MD  Department of Hospital Medicine   [facility]

## 2018-05-03 NOTE — SUBJECTIVE & OBJECTIVE
Interval History: POD 4.  Vomited this AM.  KUB shows gas throughout small and large bowel consistent with post op ileus.    Medications:  Continuous Infusions:   amino acid 2.75% - dextrose 5% (CLINIMIX-E) solution with additives (1L provides 27.5 gm AA, 50 gm CHO (170 kcal/L dextrose), Na 35, K 30, Mg 5, Ca 4.5, Acetate 51, Cl 39, Phos 15) 100 mL/hr at 05/02/18 1613    amino acid 2.75% - dextrose 5% (CLINIMIX-E) solution with additives (1L provides 27.5 gm AA, 50 gm CHO (170 kcal/L dextrose), Na 35, K 30, Mg 5, Ca 4.5, Acetate 51, Cl 39, Phos 15)       Scheduled Meds:   ciprofloxacin (CIPRO)400mg/200ml D5W IVPB  400 mg Intravenous Q12H    docusate sodium  100 mg Oral BID    enoxaparin  40 mg Subcutaneous Daily    metoclopramide HCl  10 mg Intravenous Q6H    pantoprazole  40 mg Intravenous BID    piperacillin-tazobactam (ZOSYN) IVPB  3.375 g Intravenous Q6H    polyethylene glycol  17 g Oral BID    tamsulosin  0.4 mg Oral Daily     PRN Meds:aluminum-magnesium hydroxide-simethicone, bisacodyl, HYDROmorphone, ondansetron, pneumoc 13-lopez conj-dip cr(PF), ramelteon, sodium chloride 0.9%     Review of patient's allergies indicates:  No Known Allergies  Objective:     Vital Signs (Most Recent):  Temp: 98.3 °F (36.8 °C) (05/03/18 0733)  Pulse: 85 (05/03/18 0733)  Resp: 18 (05/03/18 0733)  BP: 116/67 (05/03/18 0733)  SpO2: (!) 91 % (05/03/18 0733) Vital Signs (24h Range):  Temp:  [98 °F (36.7 °C)-98.3 °F (36.8 °C)] 98.3 °F (36.8 °C)  Pulse:  [62-85] 85  Resp:  [18-20] 18  SpO2:  [90 %-97 %] 91 %  BP: (105-127)/(63-69) 116/67     Weight: 84.7 kg (186 lb 11.7 oz)  Body mass index is 25.33 kg/m².    Intake/Output - Last 3 Shifts       05/01 0700 - 05/02 0659 05/02 0700 - 05/03 0659 05/03 0700 - 05/04 0659    P.O. 440 720     I.V. (mL/kg) 900 (10.6)      IV Piggyback 600 500     .7 1366.7     Total Intake(mL/kg) 2176.7 (25.7) 2586.7 (30.5)     Urine (mL/kg/hr) 850 (0.4) 900 (0.4)     Total Output 850 900       Net +1326.7 +1686.7                   Physical Exam   Constitutional: He is oriented to person, place, and time. He appears well-developed and well-nourished.   HENT:   Head: Normocephalic and atraumatic.   Right Ear: External ear normal.   Left Ear: External ear normal.   Eyes: Conjunctivae are normal. Pupils are equal, round, and reactive to light.   Neck: Normal range of motion.   Cardiovascular: Normal rate and regular rhythm.    Pulmonary/Chest: Effort normal. No respiratory distress. He exhibits no tenderness.   Abdominal: Soft. He exhibits no distension and no mass. Bowel sounds are decreased.   Musculoskeletal: He exhibits no edema or tenderness.   Neurological: He is alert and oriented to person, place, and time. He has normal reflexes. No cranial nerve deficit.   Skin: Skin is warm and dry. No rash noted. No erythema. No pallor.   Psychiatric: He has a normal mood and affect. His behavior is normal. Judgment and thought content normal.   Nursing note and vitals reviewed.      Significant Labs:  CBC:   Recent Labs  Lab 05/03/18  0501   WBC 14.40*   RBC 3.98*   HGB 12.6*   HCT 37.7*      MCV 95   MCH 31.6*   MCHC 33.3     BMP:   Recent Labs  Lab 05/03/18  0501   *   *   K 4.4   CL 99   CO2 28   BUN 14   CREATININE 0.7   CALCIUM 8.3*   MG 1.9       Significant Diagnostics:  I have reviewed all pertinent imaging results/findings within the past 24 hours.

## 2018-05-03 NOTE — PLAN OF CARE
Problem: Occupational Therapy Goal  Goal: Occupational Therapy Goal  Goals to be met by: 5/17/2018    Patient will increase functional independence with ADLs by performing:    Feeding with Supervision.  Grooming while seated with Minimal Assistance.  Sitting at edge of bed x10 minutes with Moderate Assistance.  Supine to sit with Maximum Assistance.  Toilet transfer to bedside commode with Maximum Assistance.    Outcome: Ongoing (interventions implemented as appropriate)  OT evaluation completed today. Goals & care plan established.    DEEPTI Noriega  5/3/2018

## 2018-05-03 NOTE — PLAN OF CARE
05/03/18 1548   PRE-TX-O2-ETCO2   O2 Device (Oxygen Therapy) nasal cannula   $ Is the patient on Low Flow Oxygen? Yes   Flow (L/min) 5   SpO2 95 %   Pulse Oximetry Type Intermittent   $ Pulse Oximetry - Multiple Charge Pulse Oximetry - Multiple   Incentive Spirometer   $ Incentive Spirometer Charges done with encouragement   Administration (Incentive Spirometer) done with encouragement   Number of Repetitions (Incentive Spirometer) 10   Level (mL) (Incentive Spirometer) 500   Patient Tolerance fair

## 2018-05-03 NOTE — PT/OT/SLP PROGRESS
"Physical Therapy Treatment    Patient Name:  Yamil Santiago Jr.   MRN:  [id number]    Recommendations:     Discharge Recommendations:  nursing facility, skilled   Discharge Equipment Recommendations: none   Barriers to discharge: Decreased caregiver support    Assessment:     Yamil Santiago Jr. is a 89 y.o. male admitted with a medical diagnosis of <principal problem not specified>.  He presents with the following impairments/functional limitations:  weakness, impaired endurance, impaired self care skills, impaired functional mobilty, impaired balance, decreased upper extremity function, decreased lower extremity function, decreased safety awareness, impaired cardiopulmonary response to activity, pain , Pt with c/o dizziness and SOB while seated EOB- pt with quadriparesis. Pt to benefit from SNF.    Rehab Prognosis:  fair; patient would benefit from acute skilled PT services to address these deficits and reach maximum level of function.      Recent Surgery: Procedure(s) (LRB):  EXPLORATORY-LAPAROTOMY (N/A) 4 Days Post-Op    Plan:     During this hospitalization, patient to be seen 6 x/week to address the above listed problems via therapeutic activities, therapeutic exercises, wheelchair management/training  · Plan of Care Expires:  05/25/18   Plan of Care Reviewed with: patient    Subjective     Communicated with nurse Vera prior to session.  Patient found supine upon PT entry to room, agreeable to treatment.    Stated don't feel too good  "i want and don't want to sit at the EOB" pt c/o dizziness and SOB while seated    Chief Complaint: dizzy " Let me lie back down"  Patient comments/goals: get back to sitting up  Pain/Comfort:  ·      Patients cultural, spiritual, Gnosticist conflicts given the current situation:      Objective:     Patient found with: telemetry, oxygen, peripheral IV     General Precautions: Standard, fall, respiratory   Orthopedic Precautions:N/A   Braces: N/A     Functional Mobility:  · Bed Mobility: "     · Rolling Left:  maximal assistance and total assistance  · Scooting: total assistance  · Supine to Sit: total assistance  · Sit to Supine: total assistance  · Transfers:         AM-PAC 6 CLICK MOBILITY  Turning over in bed (including adjusting bedclothes, sheets and blankets)?: 2  Sitting down on and standing up from a chair with arms (e.g., wheelchair, bedside commode, etc.): 1  Moving from lying on back to sitting on the side of the bed?: 2  Moving to and from a bed to a chair (including a wheelchair)?: 1  Need to walk in hospital room?: 1  Climbing 3-5 steps with a railing?: 1  Total Score: 8       Therapeutic Activities and Exercises:   thera ex with stretching to LE's at first visit  OT came in to assess pt  Patient visited again for EOB sitting with assist x2  Pt tolerated less than 1 minute sitting with c/o dizziness /61 desaturation 83%- nurse Jody came and O2 adjusted with increased SAT 94%  Pt repositioned in bed  Pillows to offload both heels      Patient left HOB elevated with all lines intact, call button in reach and nurse Jody and OT Nico present..    GOALS:    Physical Therapy Goals        Problem: Physical Therapy Goal    Goal Priority Disciplines Outcome Goal Variances Interventions   Physical Therapy Goal     PT/OT, PT Ongoing (interventions implemented as appropriate)     Description:  Goals to be met by: 2018     Patient will increase functional independence with mobility by performin. Supine to sit with Maximum Assistance  2. Bed to chair transfer with Maximum Assistance using anterior/posterior scooting or lateral technique  3. Sitting at edge of bed x20 minutes with Maximum Assistance  4. Lower extremity exercise program x20 reps passively/stretching                      Time Tracking:     PT Received On: 18  PT Start Time: 1123      1147  PT Stop Time: 1135       1201  PT Total Time (min): 12 min    14 min    Billable Minutes: Therapeutic Activity 14 and  Therapeutic Exercise 12    Treatment Type: Treatment  PT/PTA: PT           Rafaela Pickens, PT  05/03/2018

## 2018-05-03 NOTE — PLAN OF CARE
Problem: Patient Care Overview  Goal: Plan of Care Review  Patient sats 90% on 3lpm/ I S done 500ml x10bpm

## 2018-05-03 NOTE — NURSING
Pt had coffee ground emesis approx 150cc notified MD. Pt stated that it was from his nausea medication. Stat KUB ordered.

## 2018-05-03 NOTE — PT/OT/SLP EVAL
"Occupational Therapy   Evaluation    Name: Yamil Santiago Jr.  MRN: [id number]  Admitting Diagnosis:  <principal problem not specified> 4 Days Post-Op    Recommendations:     Discharge Recommendations: nursing facility, skilled  Discharge Equipment Recommendations:  none  Barriers to discharge:  Decreased caregiver support    History:     Occupational Profile:  Living Environment: Pt lives with spouse who is disabled.  Previous level of function: Pt required assistance with all ADLs. Pt was ambulatory with power chair. Pt was able to transfer himself to the power chair from his bed with assist.   Equipment Owned:  power chair  Assistance upon Discharge: Pt will receive assistance from family.    Past Medical History:   Diagnosis Date    Hepatitis C     Neck injury     Paraplegic spinal paralysis        History reviewed. No pertinent surgical history.    Subjective     Chief Complaint: stomach discomfort  Patient comments/goals: "To sit to the EOB."  Communicated with: nurse Vera prior to session.  Pain/Comfort:  · Pain Rating 1: 0/10  · Pain Rating Post-Intervention 1: 0/10    Patients cultural, spiritual, Confucianism conflicts given the current situation:      Objective:     Patient found with: telemetry, oxygen, grubbs catheter, peripheral IV    General Precautions: Standard, fall, respiratory   Orthopedic Precautions:N/A   Braces: N/A     Occupational Performance:    Bed Mobility:    · Patient completed Scooting/Bridging with total assistance  · Patient completed Supine to Sit with total assistance x 2 for UB & BLE support  · Patient completed Sit to Supine with total assistance x 2 for UB and BLE support    Functional Mobility/Transfers:  · Functional Mobility: Pt moved to EOB with total A x 2. Pt was able to remain at EOB for ~5 min before requesting to be positioned back to HOB. Pt required total A with trunk support while sitting EOB.    Cognitive/Visual Perceptual:  Cognitive/Psychosocial Skills:     -       " Oriented to: x4   -       Follows Commands/attention:Follows two-step commands  -       Communication: clear/fluent  -       Mood/Affect/Coping skills/emotional control: Appropriate to situation  Visual/Perceptual:      -Intact    Physical Exam:  Balance:    -       Sitting: poor(-)  Postural examination/scapula alignment:    -       Rounded shoulders  -       Forward head  Upper Extremity Range of Motion:     -       Right Upper Extremity: <120' at shoulder; WFL at elbow and wrist  -       Left Upper Extremity: <120' at shoulder; WFL at elbow and wrist  Upper Extremity Strength:    -       Right Upper Extremity: 3/5 throughout  -       Left Upper Extremity: 3/5 throughout   Strength:    -       Right Upper Extremity: 3-/5  -       Left Upper Extremity: 3-/5  Fine Motor Coordination:    -       Impaired 2/2 quadraplegia; pt has partial grasp; requires adaptive equipment for feeding.  Gross motor coordination: Some function in BUE in shoulders and elbows    Patient left HOB elevated with all lines intact and call button in reach    AMPAC 6 Click:  AMPAC Total Score: 8    Treatment & Education:  Pt performed BUE AROM exercises (shoulder flexion, elbow flexion/extension in gravity eliminated position, wrist flexion/ext, and finger flexion/ext, abd/add) while supine in bed.  Education:    Assessment:     Yamil Santiago Jr. is a 89 y.o. male with a medical diagnosis of <principal problem not specified>.  He presents with severe limitations with movement in extremities 2/2 quadriparesis that significantly decrease pt's independence with ADLs and functional mobility.  Performance deficits affecting function are weakness, impaired endurance, impaired self care skills, impaired functional mobilty, decreased lower extremity function, decreased upper extremity function, impaired cardiopulmonary response to activity, impaired balance.      Rehab Prognosis:  fair; patient would benefit from acute skilled OT services to address  "these deficits and reach maximum level of function.         Clinical Decision Making:     3.  OT High:  "Pt evaluation falls under high complexity for evaluation category due to 5+ performance deficits identified with comprehensive assessments and significant modifications/assistance required. An expanded review of history and occupational profile completed in addition to expanded review of physical, cognitive and psychosocial history. Several treatment options considered in care."     Plan:     Patient to be seen 4 x/week to address the above listed problems via self-care/home management, therapeutic activities, therapeutic exercises  · Plan of Care Expires: 05/17/18  · Plan of Care Reviewed with: patient    This Plan of care has been discussed with the patient who was involved in its development and understands and is in agreement with the identified goals and treatment plan    GOALS:    Occupational Therapy Goals        Problem: Occupational Therapy Goal    Goal Priority Disciplines Outcome Interventions   Occupational Therapy Goal     OT, PT/OT Ongoing (interventions implemented as appropriate)    Description:  Goals to be met by: 5/17/2018    Patient will increase functional independence with ADLs by performing:    Feeding with Supervision.  Grooming while seated with Minimal Assistance.  Sitting at edge of bed x10 minutes with Moderate Assistance.  Supine to sit with Maximum Assistance.  Toilet transfer to bedside commode with Maximum Assistance.                      Time Tracking:     OT Date of Treatment: 05/03/18  OT Start Time: 1136  OT Stop Time: 1203  OT Total Time (min): 27 min    Billable Minutes:Evaluation 10  Therapeutic Exercise 17    Nico Fortune, OT  5/3/2018    "

## 2018-05-03 NOTE — PROGRESS NOTES
[facility] Surgery  Progress Note    Subjective:     History of Present Illness:  Patient is a 89 y.o. male admitted to Hospitalist Service from [facility] Room with complaint of vomiting for 2 days and dysuria. 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 Part of the history obtained from patient's wife. Patient has been taking PO Keflex for UTI diagnosed 4 days ago.  presents to the ED with vomiting, constipation,frequent urination, decreased urination, decreased appetite with an onset x 1 day PTA. The patient reports that he was recently discharged from the hospital x 5 days ago for a UTI. Patient reports that he has been taking his antibiotics (500mg Keflex)  as directed, but believes they are making him feel badly. He states that he began vomiting yesterday. He denies nausea at this time .  He states that the vomit was clear. Patient states that he has not eaten in two days. He claims that he has not had a bowel movement in 2-3 days. Patient denies cough, swelling,hematuria, hematemesis, bright red blood in stool, or any other symptoms at this time.  Family reports that they are concerned that the patient may have diabetes. They report that the patient has not seen his PCP since August 2017. No bed sores reported. Patient denied chest pain, shortness of breath, headache, vision changes, focal neuro-deficits, cough or fever.  CT showed:  FINDINGS:  There are bilateral small pleural effusions right greater than left and moderate atelectasis at both lung bases.    The liver is of normal size and CT density.  A trace ascites is noted adjacent to the liver.  A gallbladder is not seen.  The pancreas is atrophied without mass or edema.  The spleen is of normal size and CT density as well.    The adrenal glands are not enlarged.  The kidneys are of normal  size contour and contrast enhancement.  The abdominal aorta and inferior vena cava are of normal caliber.    All the small bowel loops contain liquid but are not markedly distended.  The colon contains stool and is not dilated on today's study.  No free fluid or free air is seen.    In the pelvis there is marked prostate hypertrophy.  The bladder is otherwise of normal size and wall thickness.   Impression       Probable adynamic ileus with all the small bowel loops containing fluid but without marked distention seen.  The colon contains stool but is not distended.  Severe prostate hypertrophy.  Bilateral small pleural effusions right greater than left and trace ascites around the liver       Patient had NG tube placed today, but not in good position.  Replaced by me with good return of stomach contents (1000cc)       Post-Op Info:  Procedure(s) (LRB):  EXPLORATORY-LAPAROTOMY (N/A)   4 Days Post-Op     Interval History: POD 4.  Vomited this AM.  KUB shows gas throughout small and large bowel consistent with post op ileus.    Medications:  Continuous Infusions:   amino acid 2.75% - dextrose 5% (CLINIMIX-E) solution with additives (1L provides 27.5 gm AA, 50 gm CHO (170 kcal/L dextrose), Na 35, K 30, Mg 5, Ca 4.5, Acetate 51, Cl 39, Phos 15) 100 mL/hr at 05/02/18 1613    amino acid 2.75% - dextrose 5% (CLINIMIX-E) solution with additives (1L provides 27.5 gm AA, 50 gm CHO (170 kcal/L dextrose), Na 35, K 30, Mg 5, Ca 4.5, Acetate 51, Cl 39, Phos 15)       Scheduled Meds:   ciprofloxacin (CIPRO)400mg/200ml D5W IVPB  400 mg Intravenous Q12H    docusate sodium  100 mg Oral BID    enoxaparin  40 mg Subcutaneous Daily    metoclopramide HCl  10 mg Intravenous Q6H    pantoprazole  40 mg Intravenous BID    piperacillin-tazobactam (ZOSYN) IVPB  3.375 g Intravenous Q6H    polyethylene glycol  17 g Oral BID    tamsulosin  0.4 mg Oral Daily     PRN Meds:aluminum-magnesium hydroxide-simethicone, bisacodyl, HYDROmorphone,  ondansetron, pneumoc 13-lopez conj-dip cr(PF), ramelteon, sodium chloride 0.9%     Review of patient's allergies indicates:  No Known Allergies  Objective:     Vital Signs (Most Recent):  Temp: 98.3 °F (36.8 °C) (05/03/18 0733)  Pulse: 85 (05/03/18 0733)  Resp: 18 (05/03/18 0733)  BP: 116/67 (05/03/18 0733)  SpO2: (!) 91 % (05/03/18 0733) Vital Signs (24h Range):  Temp:  [98 °F (36.7 °C)-98.3 °F (36.8 °C)] 98.3 °F (36.8 °C)  Pulse:  [62-85] 85  Resp:  [18-20] 18  SpO2:  [90 %-97 %] 91 %  BP: (105-127)/(63-69) 116/67     Weight: 84.7 kg (186 lb 11.7 oz)  Body mass index is 25.33 kg/m².    Intake/Output - Last 3 Shifts       05/01 0700 - 05/02 0659 05/02 0700 - 05/03 0659 05/03 0700 - 05/04 0659    P.O. 440 720     I.V. (mL/kg) 900 (10.6)      IV Piggyback 600 500     .7 1366.7     Total Intake(mL/kg) 2176.7 (25.7) 2586.7 (30.5)     Urine (mL/kg/hr) 850 (0.4) 900 (0.4)     Total Output 850 900      Net +1326.7 +1686.7                   Physical Exam   Constitutional: He is oriented to person, place, and time. He appears well-developed and well-nourished.   HENT:   Head: Normocephalic and atraumatic.   Right Ear: External ear normal.   Left Ear: External ear normal.   Eyes: Conjunctivae are normal. Pupils are equal, round, and reactive to light.   Neck: Normal range of motion.   Cardiovascular: Normal rate and regular rhythm.    Pulmonary/Chest: Effort normal. No respiratory distress. He exhibits no tenderness.   Abdominal: Soft. He exhibits no distension and no mass. Bowel sounds are decreased.   Musculoskeletal: He exhibits no edema or tenderness.   Neurological: He is alert and oriented to person, place, and time. He has normal reflexes. No cranial nerve deficit.   Skin: Skin is warm and dry. No rash noted. No erythema. No pallor.   Psychiatric: He has a normal mood and affect. His behavior is normal. Judgment and thought content normal.   Nursing note and vitals reviewed.      Significant Labs:  CBC:   Recent  Labs  Lab 05/03/18  0501   WBC 14.40*   RBC 3.98*   HGB 12.6*   HCT 37.7*      MCV 95   MCH 31.6*   MCHC 33.3     BMP:   Recent Labs  Lab 05/03/18  0501   *   *   K 4.4   CL 99   CO2 28   BUN 14   CREATININE 0.7   CALCIUM 8.3*   MG 1.9       Significant Diagnostics:  I have reviewed all pertinent imaging results/findings within the past 24 hours.    Assessment/Plan:     SBO (small bowel obstruction)    NPO except ice chips  Add reglan.        Non-intractable vomiting with nausea    Ileus vs early SBO.  Will keep on NG suction overnight and repeat KUB  May need SBS if no improvement  Dulcolax ordered by hospitalist.            Morales Peña MD  General Surgery  [facility]

## 2018-05-03 NOTE — PT/OT/SLP PROGRESS
Physical Therapy      Patient Name:  Yamil Santiago Jr.   MRN:  [id number]    Patient not seen today secondary to  (PT attempted- pt with nausea/vomitting). Will follow-up later today  .    Rafaela Pickens, PT

## 2018-05-03 NOTE — PLAN OF CARE
Problem: Patient Care Overview  Goal: Plan of Care Review  Outcome: Ongoing (interventions implemented as appropriate)  Plan of care reviewed with patient & granddaughter. O2-3l per NC in use. SR on telemetry. Gonzales catheter intact & patent. Lap sites x3 with band aids intact. Medicated with zofran for nausea once during shift, moderate relief obtained. Clinimix infusing as per orders. Remains free from falls/ injury. Instructed to call for assistance as needed during night. Verbalized understanding call light in reach.

## 2018-05-03 NOTE — PLAN OF CARE
Problem: Physical Therapy Goal  Goal: Physical Therapy Goal  Goals to be met by: 2018     Patient will increase functional independence with mobility by performin. Supine to sit with Maximum Assistance  2. Bed to chair transfer with Maximum Assistance using anterior/posterior scooting or lateral technique  3. Sitting at edge of bed x20 minutes with Maximum Assistance  4. Lower extremity exercise program x20 reps passively/stretching     Outcome: Ongoing (interventions implemented as appropriate)  PT reattempted earlier but hold due to n/v. Reattempted- pt seen for CHAVA's thera ex. Supine to sit EOB total assist x2 with onset of desaturation and dizziness, Nurse Vera at the bedside. Treatment terminated, repositioned in bed

## 2018-05-04 NOTE — PLAN OF CARE
Problem: Physical Therapy Goal  Goal: Physical Therapy Goal  Goals to be met by: 2018     Patient will increase functional independence with mobility by performin. Supine to sit with Maximum Assistance  2. Bed to chair transfer with Maximum Assistance using anterior/posterior scooting or lateral technique  3. Sitting at edge of bed x20 minutes with Maximum Assistance  4. Lower extremity exercise program x20 reps passively/stretching     Outcome: Ongoing (interventions implemented as appropriate)  Pt seen for LE's thera ex and stretching exercises. Pt sat at the edge of the bed with 2 people assist with onset of dizziness but no SOB. Pt to benefit from SNF

## 2018-05-04 NOTE — ASSESSMENT & PLAN NOTE
Contributing Nutrition Diagnosis  Inadequate energy intake    Related to (etiology):   Acuity of illness    Signs and Symptoms (as evidenced by):   1) <50% estimated energy needs >5 days  2) Moderate muscle loss noted around clavicle  3) Moderate fat loss noted in upper arm, orbital  4) Hand , weak    Interventions/Recommendations (treatment strategy):  1) progress to soft/bland diet + Boost Plus with meals (discontinue)  2) If unable to progress, recommend increasing PPN to 125 mls/hr and add lipids VS TPN (disoncontinue)   3) continue enteral feeds    Nutrition Diagnosis Status:   continues

## 2018-05-04 NOTE — SUBJECTIVE & OBJECTIVE
Interval History: Had 2 BMs.  No further nausea.  Begin full liquids.     Medications:  Continuous Infusions:   amino acid 2.75% - dextrose 5% (CLINIMIX-E) solution with additives (1L provides 27.5 gm AA, 50 gm CHO (170 kcal/L dextrose), Na 35, K 30, Mg 5, Ca 4.5, Acetate 51, Cl 39, Phos 15) 100 mL/hr at 05/03/18 1524    amino acid 2.75% - dextrose 5% (CLINIMIX-E) solution with additives (1L provides 27.5 gm AA, 50 gm CHO (170 kcal/L dextrose), Na 35, K 30, Mg 5, Ca 4.5, Acetate 51, Cl 39, Phos 15)       Scheduled Meds:   ciprofloxacin (CIPRO)400mg/200ml D5W IVPB  400 mg Intravenous Q12H    docusate sodium  100 mg Oral BID    enoxaparin  40 mg Subcutaneous Daily    metoclopramide HCl  10 mg Intravenous Q6H    pantoprazole  40 mg Intravenous BID    piperacillin-tazobactam (ZOSYN) IVPB  3.375 g Intravenous Q6H    polyethylene glycol  17 g Oral BID    tamsulosin  0.4 mg Oral Daily     PRN Meds:aluminum-magnesium hydroxide-simethicone, bisacodyl, HYDROmorphone, ondansetron, pneumoc 13-lopez conj-dip cr(PF), ramelteon, sodium chloride 0.9%     Review of patient's allergies indicates:  No Known Allergies  Objective:     Vital Signs (Most Recent):  Temp: 97 °F (36.1 °C) (05/04/18 1155)  Pulse: 65 (05/04/18 1155)  Resp: 18 (05/04/18 1155)  BP: (!) 112/53 (05/04/18 1155)  SpO2: (!) 92 % (05/04/18 1155) Vital Signs (24h Range):  Temp:  [96.7 °F (35.9 °C)-98.6 °F (37 °C)] 97 °F (36.1 °C)  Pulse:  [65-85] 65  Resp:  [14-20] 18  SpO2:  [90 %-95 %] 92 %  BP: (111-133)/(53-82) 112/53     Weight: 84.7 kg (186 lb 11.7 oz)  Body mass index is 25.33 kg/m².    Intake/Output - Last 3 Shifts       05/02 0700 - 05/03 0659 05/03 0700 - 05/04 0659 05/04 0700 - 05/05 0659    P.O. 720 60     IV Piggyback 500 550     TPN 1366.7 1460     Total Intake(mL/kg) 2586.7 (30.5) 2070 (24.4)     Urine (mL/kg/hr) 900 (0.4) 1150 (0.6)     Total Output 900 1150      Net +1686.7 +920             Stool Occurrence  2 x           Physical Exam    Constitutional: He is oriented to person, place, and time. He appears well-developed and well-nourished.   HENT:   Head: Normocephalic and atraumatic.   Right Ear: External ear normal.   Left Ear: External ear normal.   Eyes: Conjunctivae are normal. Pupils are equal, round, and reactive to light.   Neck: Normal range of motion.   Cardiovascular: Normal rate and regular rhythm.    Pulmonary/Chest: Effort normal. No respiratory distress. He exhibits no tenderness.   Abdominal: Soft. He exhibits no distension and no mass.   Musculoskeletal: He exhibits no edema or tenderness.   Neurological: He is alert and oriented to person, place, and time. He has normal reflexes. No cranial nerve deficit.   Skin: Skin is warm and dry. No rash noted. No erythema. No pallor.   Psychiatric: He has a normal mood and affect. His behavior is normal. Judgment and thought content normal.   Nursing note and vitals reviewed.      Significant Labs:  CBC:   Recent Labs  Lab 05/04/18  0520   WBC 16.50*   RBC 3.66*   HGB 11.6*   HCT 34.7*      MCV 95   MCH 31.6*   MCHC 33.4     CMP:   Recent Labs  Lab 05/04/18  0520   *   CALCIUM 8.3*   ALBUMIN 2.1*   PROT 5.3*   *   K 4.4   CO2 26      BUN 15   CREATININE 0.7   ALKPHOS 56   ALT 27   AST 34   BILITOT 1.3*       Significant Diagnostics:  I have reviewed all pertinent imaging results/findings within the past 24 hours.

## 2018-05-04 NOTE — PROGRESS NOTES
[facility] Surgery  Progress Note    Subjective:     History of Present Illness:  Patient is a 89 y.o. male admitted to Hospitalist Service from [facility] Emergency Room with complaint of vomiting for 2 days and dysuria. 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 Part of the history obtained from patient's wife. Patient has been taking PO Keflex for UTI diagnosed 4 days ago.  presents to the ED with vomiting, constipation,frequent urination, decreased urination, decreased appetite with an onset x 1 day PTA. The patient reports that he was recently discharged from the hospital x 5 days ago for a UTI. Patient reports that he has been taking his antibiotics (500mg Keflex)  as directed, but believes they are making him feel badly. He states that he began vomiting yesterday. He denies nausea at this time .  He states that the vomit was clear. Patient states that he has not eaten in two days. He claims that he has not had a bowel movement in 2-3 days. Patient denies cough, swelling,hematuria, hematemesis, bright red blood in stool, or any other symptoms at this time.  Family reports that they are concerned that the patient may have diabetes. They report that the patient has not seen his PCP since August 2017. No bed sores reported. Patient denied chest pain, shortness of breath, headache, vision changes, focal neuro-deficits, cough or fever.  CT showed:  FINDINGS:  There are bilateral small pleural effusions right greater than left and moderate atelectasis at both lung bases.    The liver is of normal size and CT density.  A trace ascites is noted adjacent to the liver.  A gallbladder is not seen.  The pancreas is atrophied without mass or edema.  The spleen is of normal size and CT density as well.    The adrenal glands are not enlarged.  The kidneys are of normal  size contour and contrast enhancement.  The abdominal aorta and inferior vena cava are of normal caliber.    All the small bowel loops contain liquid but are not markedly distended.  The colon contains stool and is not dilated on today's study.  No free fluid or free air is seen.    In the pelvis there is marked prostate hypertrophy.  The bladder is otherwise of normal size and wall thickness.   Impression       Probable adynamic ileus with all the small bowel loops containing fluid but without marked distention seen.  The colon contains stool but is not distended.  Severe prostate hypertrophy.  Bilateral small pleural effusions right greater than left and trace ascites around the liver       Patient had NG tube placed today, but not in good position.  Replaced by me with good return of stomach contents (1000cc)       Post-Op Info:  Procedure(s) (LRB):  EXPLORATORY-LAPAROTOMY (N/A)   5 Days Post-Op     Interval History: Had 2 BMs.  No further nausea.  Begin full liquids.     Medications:  Continuous Infusions:   amino acid 2.75% - dextrose 5% (CLINIMIX-E) solution with additives (1L provides 27.5 gm AA, 50 gm CHO (170 kcal/L dextrose), Na 35, K 30, Mg 5, Ca 4.5, Acetate 51, Cl 39, Phos 15) 100 mL/hr at 05/03/18 1524    amino acid 2.75% - dextrose 5% (CLINIMIX-E) solution with additives (1L provides 27.5 gm AA, 50 gm CHO (170 kcal/L dextrose), Na 35, K 30, Mg 5, Ca 4.5, Acetate 51, Cl 39, Phos 15)       Scheduled Meds:   ciprofloxacin (CIPRO)400mg/200ml D5W IVPB  400 mg Intravenous Q12H    docusate sodium  100 mg Oral BID    enoxaparin  40 mg Subcutaneous Daily    metoclopramide HCl  10 mg Intravenous Q6H    pantoprazole  40 mg Intravenous BID    piperacillin-tazobactam (ZOSYN) IVPB  3.375 g Intravenous Q6H    polyethylene glycol  17 g Oral BID    tamsulosin  0.4 mg Oral Daily     PRN Meds:aluminum-magnesium hydroxide-simethicone, bisacodyl, HYDROmorphone, ondansetron, pneumoc 13-lopez conj-dip cr(PF),  ramelteon, sodium chloride 0.9%     Review of patient's allergies indicates:  No Known Allergies  Objective:     Vital Signs (Most Recent):  Temp: 97 °F (36.1 °C) (05/04/18 1155)  Pulse: 65 (05/04/18 1155)  Resp: 18 (05/04/18 1155)  BP: (!) 112/53 (05/04/18 1155)  SpO2: (!) 92 % (05/04/18 1155) Vital Signs (24h Range):  Temp:  [96.7 °F (35.9 °C)-98.6 °F (37 °C)] 97 °F (36.1 °C)  Pulse:  [65-85] 65  Resp:  [14-20] 18  SpO2:  [90 %-95 %] 92 %  BP: (111-133)/(53-82) 112/53     Weight: 84.7 kg (186 lb 11.7 oz)  Body mass index is 25.33 kg/m².    Intake/Output - Last 3 Shifts       05/02 0700 - 05/03 0659 05/03 0700 - 05/04 0659 05/04 0700 - 05/05 0659    P.O. 720 60     IV Piggyback 500 550     TPN 1366.7 1460     Total Intake(mL/kg) 2586.7 (30.5) 2070 (24.4)     Urine (mL/kg/hr) 900 (0.4) 1150 (0.6)     Total Output 900 1150      Net +1686.7 +920             Stool Occurrence  2 x           Physical Exam   Constitutional: He is oriented to person, place, and time. He appears well-developed and well-nourished.   HENT:   Head: Normocephalic and atraumatic.   Right Ear: External ear normal.   Left Ear: External ear normal.   Eyes: Conjunctivae are normal. Pupils are equal, round, and reactive to light.   Neck: Normal range of motion.   Cardiovascular: Normal rate and regular rhythm.    Pulmonary/Chest: Effort normal. No respiratory distress. He exhibits no tenderness.   Abdominal: Soft. He exhibits no distension and no mass.   Musculoskeletal: He exhibits no edema or tenderness.   Neurological: He is alert and oriented to person, place, and time. He has normal reflexes. No cranial nerve deficit.   Skin: Skin is warm and dry. No rash noted. No erythema. No pallor.   Psychiatric: He has a normal mood and affect. His behavior is normal. Judgment and thought content normal.   Nursing note and vitals reviewed.      Significant Labs:  CBC:   Recent Labs  Lab 05/04/18  0520   WBC 16.50*   RBC 3.66*   HGB 11.6*   HCT 34.7*   PLT  189   MCV 95   MCH 31.6*   MCHC 33.4     CMP:   Recent Labs  Lab 05/04/18  0520   *   CALCIUM 8.3*   ALBUMIN 2.1*   PROT 5.3*   *   K 4.4   CO2 26      BUN 15   CREATININE 0.7   ALKPHOS 56   ALT 27   AST 34   BILITOT 1.3*       Significant Diagnostics:  I have reviewed all pertinent imaging results/findings within the past 24 hours.    Assessment/Plan:     SBO (small bowel obstruction)    Full liquids        Non-intractable vomiting with nausea    Ileus vs early SBO.  Will keep on NG suction overnight and repeat KUB  May need SBS if no improvement  Dulcolax ordered by hospitalist.            Morales Peña MD  General Surgery  [facility]

## 2018-05-04 NOTE — PROGRESS NOTES
Progress Note  Hospital Medicine  Patient Name:Yamil Santiago Jr.  MRN:  [id number]  Patient Class: IP- Inpatient  Admit Date: 4/26/2018  Length of Stay: 7 days  Expected Discharge Date:   Attending Physician: Kimberly Sorensen MD  Primary Care Provider:  Tima Schuster MD    SUBJECTIVE:     Principal Problem: Vomiting for 2 days and dysuria  Initial history of present illness: Patient is a 89 y.o. male admitted to Hospitalist Service from [facility] Room with complaint of vomiting for 2 days and dysuria. 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 Part of the history obtained from patient's wife. Patient has been taking PO Keflex for UTI diagnosed 4 days ago.  presents to the ED with vomiting, constipation,frequent urination, decreased urination, decreased appetite with an onset x 1 day PTA. The patient reports that he was recently discharged from the hospital x 5 days ago for a UTI. Patient reports that he has been taking his antibiotics (500mg Keflex)  as directed, but believes they are making him feel badly. He states that he began vomiting yesterday. He denies nausea at this time .  He states that the vomit was clear. Patient states that he has not eaten in two days. He claims that he has not had a bowel movement in 2-3 days. Patient denies cough, swelling,hematuria, hematemesis, bright red blood in stool, or any other symptoms at this time. Family reports that they are concerned that the patient may have diabetes. They report that the patient has not seen his PCP since August 2017. No bed sores reported. Patient denied chest pain, shortness of breath, headache, vision changes, focal neuro-deficits, cough or fever.    PMH/PSH/SH/FH/Meds: reviewed.    Symptoms/Review of Systems: s/p exploratory laparotomy, adhesiolysis with extensive adhesions with release of SBO. Patient is looking  and feeling better, reportedly patient had a large bowel movement this am. No shortness of breath, cough, chest pain or headache, fever.     Diet:  Clear liquids. On Clinimix.  Activity level: Quadriplegic  Pain:  Patient reports no pain.       OBJECTIVE:   Vital Signs (Most Recent):      Temp: 96.7 °F (35.9 °C) (05/04/18 0819)  Pulse: 72 (05/04/18 0819)  Resp: 14 (05/04/18 0819)  BP: 113/82 (05/04/18 0819)  SpO2: (!) 93 % (05/04/18 0819)       Vital Signs Range (Last 24H):  Temp:  [96.7 °F (35.9 °C)-98.6 °F (37 °C)]   Pulse:  [72-85]   Resp:  [14-20]   BP: (106-133)/(56-82)   SpO2:  [90 %-95 %]     Weight: 84.7 kg (186 lb 11.7 oz)  Body mass index is 25.33 kg/m².    Intake/Output Summary (Last 24 hours) at 05/04/18 1019  Last data filed at 05/04/18 0600   Gross per 24 hour   Intake             2070 ml   Output             1150 ml   Net              920 ml     Physical Examination:  General appearance: well developed, appears stated age  Head: normocephalic, atraumatic  Eyes:  conjunctivae/corneas clear. PERRL.  Nose: Nares normal. Septum midline.  Throat: lips, mucosa, and tongue normal; teeth and gums normal, no throat erythema.  Neck: supple, symmetrical, trachea midline, no JVD and thyroid not enlarged, symmetric, no tenderness/mass/nodules  Lungs:  clear to auscultation bilaterally and normal respiratory effort  Chest wall: no tenderness  Heart: regular rate and rhythm, S1, S2 normal, no murmur, click, rub or gallop  Abdomen: soft, non-tender non-distented; bowel sounds hypoactive; no masses,  no organomegaly. Abdominal dressings C/D/I.  Extremities: no cyanosis, clubbing. 2+ edema.   Pulses: 2+ and symmetric  Skin: Skin color, texture, turgor normal. No rashes or lesions.  Lymph nodes: Cervical, supraclavicular, and axillary nodes normal.  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 CNII-XII intact.      CBC:    Recent Labs  Lab 05/02/18  0510 05/03/18  0501  05/04/18  0520   WBC 12.40 14.40* 16.50*   RBC 3.77* 3.98* 3.66*   HGB 12.1* 12.6* 11.6*   HCT 35.6* 37.7* 34.7*    195 189   MCV 95 95 95   MCH 32.0* 31.6* 31.6*   MCHC 33.8 33.3 33.4   BMP    Recent Labs  Lab 05/02/18  0510 05/03/18  0501 05/04/18  0520   * 141* 129*    133* 130*   K 4.4 4.4 4.4    99 100   CO2 27 28 26   BUN 15 14 15   CREATININE 0.7 0.7 0.7   CALCIUM 8.0* 8.3* 8.3*   MG 1.9 1.9 1.9      Diagnostic Results:  Microbiology Results (last 7 days)     Procedure Component Value Units Date/Time    Urine culture [[id number]] Collected:  04/26/18 1332    Order Status:  Completed Specimen:  Urine from Urine, Catheterized Updated:  04/28/18 0004     Urine Culture, Routine No growth    Narrative:       Cath if necessary         CT abdomen and pelvis with contrast: Probable adynamic ileus with all the small bowel loops containing fluid but without marked distention seen.  The colon contains stool but is not distended.  Severe prostate hypertrophy.  Bilateral small pleural effusions right greater than left and trace ascites around the liver.    KUB: Appropriately positioned nasogastric tube.  Improved bowel gas pattern with no distinct loops of dilated small bowel evident on this exam.    KUB: NG tube removed and interval increase in the air-filled loops of bowel suggesting ileus.    Assessment/Plan:      Small Bowel Obstruction s/p exploratory laparotomy, adhesiolysis with extensive adhesions with release of SBO.  Post-op Ileus  Continue to follow Dr. Peña's recommendations. Diet advancement as per Dr. Peña. On IV Clinimix.    Needs aggressive incentive spirometry.  Follow hemoglobin and hematocrit closely.  Pain control with PO narcotics and antiemetics as needed.      Yes    Malnutrition of moderate degree [E44.0]  Diet supplementation to be given once PO intake starts. Will encourage PO and monitor closely for weight changes.      Yes    Hepatitis C [B19.20]  Trend  LFTs.     UTI  Urine C/S - NTD. Continue IV Cipro.      Yes    CAD (coronary artery disease) [I25.10]  Patient with known CAD and monitor for S/Sx of angina/ACS. Continue to monitor on telemetry.      Yes    History of hypertension [Z86.79]  Continue chronic medications and monitor for any changes, adjusting as needed.      Not Applicable    Paraplegia [G82.20]  Supportive care.  Fall precautions.  Skin care.   Yes          DVT prophylaxis: Lovenox 40 mg SQ q day.    Continue PT.     Kimberly Sorensen MD  Department of Hospital Medicine   [facility]

## 2018-05-04 NOTE — PROGRESS NOTES
05/03/18 2039   Patient Assessment/Suction   Level of Consciousness (AVPU) alert   Respiratory Effort Normal;Unlabored   PRE-TX-O2-ETCO2   O2 Device (Oxygen Therapy) nasal cannula   Flow (L/min) 5   Oxygen Concentration (%) 40   SpO2 (!) 90 %   Pulse Oximetry Type Intermittent   Pulse 76   Resp 18   Incentive Spirometer   $ Incentive Spirometer Charges done with encouragement   Administration (Incentive Spirometer) done with encouragement   Number of Repetitions (Incentive Spirometer) 10   Level (mL) (Incentive Spirometer) 750   Patient Tolerance good   Ready to Wean/Extubation Screen   FIO2<=50 (chart decimal) 0.4

## 2018-05-04 NOTE — PLAN OF CARE
05/04/18 0811   Patient Assessment/Suction   Level of Consciousness (AVPU) alert   PRE-TX-O2-ETCO2   O2 Device (Oxygen Therapy) nasal cannula   SpO2 (!) 94 %   Pulse Oximetry Type Intermittent   $ Pulse Oximetry - Multiple Charge Pulse Oximetry - Multiple   Pulse 76   Resp 16   Incentive Spirometer   $ Incentive Spirometer Charges done with encouragement   Administration (Incentive Spirometer) done with encouragement   Number of Repetitions (Incentive Spirometer) 10   Level (mL) (Incentive Spirometer) 750   Patient Tolerance good

## 2018-05-04 NOTE — PLAN OF CARE
Problem: Patient Care Overview  Goal: Plan of Care Review  Outcome: Ongoing (interventions implemented as appropriate)  PT AAO X4. PT able to verbalize needs/want. Plan of care reviewed with patient at the beginning of the shift. Patient verbalized understanding. Chronic grubbs catheter in place flowing concentrated urine. TPN infusing as ordered. IV antibiotics infusing as ordered. Diet advanced from NPO with ice chips to clear liquids. Telemetry monitoring in place.  Patient denies any pain/nausea  this shift. O2 @ 3 liters NC. Patient has remained free from fall/injury.  Side rails up X2, bed in lowest, locked position, needs to attended to , call light within reach will continue to monitor.

## 2018-05-04 NOTE — PLAN OF CARE
Problem: Patient Care Overview  Goal: Plan of Care Review  Outcome: Ongoing (interventions implemented as appropriate)  Plan of care reviewed with patient. Clinimix infusing @ 100cc/hr as per orders. IV antibiotics given as per orders. NPO with ice chips . Lap sites x 4 with band aids intact. SR on telemetry. 2 bms during night. Remains free from falls/injury. Instructed to call for assistance as needed during night. Verbalized understanding. Call light in reach.

## 2018-05-04 NOTE — PROGRESS NOTES
[facility]  Adult Nutrition  Progress Note    SUMMARY       Recommendations    Recommendation/Intervention:3) Continue  Clinimix E 2.75/5 @ 100 mls/hr and add 20% lipids (250 mls). Provides 1172 calories, 66 gms protein, 2400 mls fluid, GIR 1.02 (55% of EEN).  VS Clinimix E 5/15 @ 90 mls/hr with 20% lipids (250 mls). Provides 2034 calories, 108 gms protein, 2160 mls fluid, GIR 2.76 (100% of EEN)  2) progress to soft/bland diet per pt tolerance   3) Add Boost Plus with meals   Goals: Pt will receive at least 85% EEN within 48 hrs.   Nutrition Goal Status: new  Communication of RD Recs:  (POC, sticky note)    Reason for Assessment    Reason for Assessment: new TPN  1. Non-intractable vomiting with nausea, unspecified vomiting type    2. Ileus    3. SBO (small bowel obstruction)    4. Coronary artery disease, angina presence unspecified, unspecified vessel or lesion type, unspecified whether native or transplanted heart    5. Chronic hepatitis C without hepatic coma    6. Malnutrition of moderate degree      Past Medical History:   Diagnosis Date    Hepatitis C     Neck injury     Paraplegic spinal paralysis      General Information Comments: Admitted with vomiting x 2 days., constipation. Pt reports . Notes he can eat all of his clear liquids, but it takes a while. <50% estimated energy needs since 4/26/18.      Nutrition Risk Screen    Nutrition Risk Screen: no indicators present    Nutrition/Diet History    Patient Reported Diet/Restrictions/Preferences: general  Food Preferences: nno intolerances. does not use ONS at home  Do you have any cultural, spiritual, Denominational conflicts, given your current situation?: none  Food Allergies: NKFA    Anthropometrics    Temp: 98.5 °F (36.9 °C)  Height Method: Stated  Height: 6'  Height (inches): 72 in  Weight Method: Bed Scale  Weight: 84.7 kg (186 lb 11.7 oz)  Weight (lb): 186.73 lb  Ideal Body Weight (IBW), Male: 178 lb  % Ideal Body Weight,  Male (lb): 104.9 lb  BMI (Calculated): 25.4  Usual Body Weight (UBW), k.9 kg (per chart review 18)  % Usual Body Weight: 101.16  % Weight Change From Usual Weight: 0.95 %       Lab/Procedures/Meds    Pertinent Labs Reviewed: reviewed  Lab Results   Component Value Date    ALBUMIN 2.1 (L) 2018     No results found for: CRP  Lab Results   Component Value Date    WBC 16.50 (H) 2018     Pertinent Medications Reviewed: reviewed  Scheduled Meds:   ciprofloxacin (CIPRO)400mg/200ml D5W IVPB  400 mg Intravenous Q12H    docusate sodium  100 mg Oral BID    enoxaparin  40 mg Subcutaneous Daily    metoclopramide HCl  10 mg Intravenous Q6H    pantoprazole  40 mg Intravenous BID    piperacillin-tazobactam (ZOSYN) IVPB  3.375 g Intravenous Q6H    polyethylene glycol  17 g Oral BID    tamsulosin  0.4 mg Oral Daily     Continuous Infusions:   amino acid 2.75% - dextrose 5% (CLINIMIX-E) solution with additives (1L provides 27.5 gm AA, 50 gm CHO (170 kcal/L dextrose), Na 35, K 30, Mg 5, Ca 4.5, Acetate 51, Cl 39, Phos 15)         Physical Findings/Assessment    Overall Physical Appearance: loss of subcutaneous fat, loss of muscle mass  Oral/Mouth Cavity: WDL  Skin:  (Ammon score 12)   Edema 3+     Estimated/Assessed Needs    Weight Used For Calorie Calculations: 84.7 kg (186 lb 11.7 oz)     Energy Need Method:  Kcal/kg  25-30 kcal/[id number]     Weight Used For Protein Calculations: 84.7 kg (186 lb 11.7 oz)   1.0-1.2 gm/kg/day:      Fluid Need Method: RDA Method     CHO Requirement: n/a      Nutrition Prescription Ordered    Current Diet Order: clear liquid    Evaluation of Received Nutrient/Fluid Intake    Energy Calories Required: not meeting needs  Protein Required: not meeting needs  Fluid Required: meeting needs    Intake/Output Summary (Last 24 hours) at 18 1626  Last data filed at 18 1230   Gross per 24 hour   Intake             2420 ml   Output              550 ml   Net              1870 ml     Tolerance: tolerating  % Intake of Estimated Energy Needs: 25 - 50 % (via TPN at 35% EEN and clear liquid diet)  % Meal Intake: 0-25 %    Nutrition Risk    Level of Risk/Frequency of Follow-up:  (2 x wkly)     Assessment and Plan    Malnutrition of moderate degree    Contributing Nutrition Diagnosis  Inadequate energy intake    Related to (etiology):   Acuity of illness    Signs and Symptoms (as evidenced by):   1) <50% estimated energy needs >5 days  2) Moderate muscle loss noted around clavicle  3) Moderate fat loss noted in upper arm, orbital  4) Hand , weak    Interventions/Recommendations (treatment strategy):  1) progress to soft/bland diet + Boost Plus with meals 2) If unable to progress, recommend increasing PPN to 125 mls/hr and add lipids VS TPN    Nutrition Diagnosis Status:   continues               Monitor and Evaluation    Food and Nutrient Intake: energy intake  Food and Nutrient Adminstration: diet order  Anthropometric Measurements: weight, weight change  Biochemical Data, Medical Tests and Procedures: inflammatory profile  Nutrition-Focused Physical Findings: overall appearance, skin     Discharge Plan    Soft/bland with ONS for prn use  RD Follow-up?: Yes

## 2018-05-04 NOTE — PT/OT/SLP PROGRESS
Physical Therapy Treatment    Patient Name:  Yamil Santiago Jr.   MRN:  [id number]    Recommendations:     Discharge Recommendations:  nursing facility, skilled   Discharge Equipment Recommendations: none   Barriers to discharge: Decreased caregiver support    Assessment:     Yamil Santiago Jr. is a 89 y.o. male admitted with a medical diagnosis of <principal problem not specified>.  He presents with the following impairments/functional limitations:  weakness, impaired endurance, impaired self care skills, impaired functional mobilty, impaired balance, decreased lower extremity function, decreased upper extremity function, impaired cardiopulmonary response to activity . Pt requiring 2 people assist for EOB sitting, poor truncal control, dizziness. Pt to benefit from SNF.    Rehab Prognosis:  fair; patient would benefit from acute skilled PT services to address these deficits and reach maximum level of function.      Recent Surgery: Procedure(s) (LRB):  EXPLORATORY-LAPAROTOMY (N/A) 5 Days Post-Op    Plan:     During this hospitalization, patient to be seen 6 x/week to address the above listed problems via therapeutic activities, therapeutic exercises, wheelchair management/training  · Plan of Care Expires:  05/25/18   Plan of Care Reviewed with: patient    Subjective     Communicated with nurse Lynn prior to session.  Patient found supine upon PT entry to room, agreeable to treatment.    Pt stated he is able to transfer to motor chair at home using sliding board but it takes a long time and has been progressively getting harder and time consuming  Pt c./o dizziness with EOB sitting although not SOB this time    Chief Complaint: dizziness  Patient comments/goals: get well  Pain/Comfort:  · Pain Rating 1: 0/10    Patients cultural, spiritual, Shinto conflicts given the current situation:      Objective:     Patient found with: oxygen, telemetry, peripheral IV, grubbs catheter     General Precautions: Standard, fall,  respiratory   Orthopedic Precautions:N/A   Braces: N/A     Functional Mobility:  · Bed Mobility:     · Rolling Right: maximal assistance  · Scooting: total assistance  · Bridging: total assistance  · Supine to Sit: total assistance and of 2 persons  · Sit to Supine: total assistance and of 2 persons      AM-PAC 6 CLICK MOBILITY  Turning over in bed (including adjusting bedclothes, sheets and blankets)?: 2  Sitting down on and standing up from a chair with arms (e.g., wheelchair, bedside commode, etc.): 1  Moving from lying on back to sitting on the side of the bed?: 2  Moving to and from a bed to a chair (including a wheelchair)?: 1  Need to walk in hospital room?: 1  Climbing 3-5 steps with a railing?: 1  Total Score: 8       Therapeutic Activities and Exercises:   passive stretching and LE's SHIKHA x 20 reps to all range- pt stated it felt good  EOB sitting with 2 people assist with poor truncal control and posterior leaning  Back to bed with total assist to repositioned HOB    Patient left HOB elevated with all lines intact, call button in reach and nurse Lynn present..    GOALS:    Physical Therapy Goals        Problem: Physical Therapy Goal    Goal Priority Disciplines Outcome Goal Variances Interventions   Physical Therapy Goal     PT/OT, PT Ongoing (interventions implemented as appropriate)     Description:  Goals to be met by: 2018     Patient will increase functional independence with mobility by performin. Supine to sit with Maximum Assistance  2. Bed to chair transfer with Maximum Assistance using anterior/posterior scooting or lateral technique  3. Sitting at edge of bed x20 minutes with Maximum Assistance  4. Lower extremity exercise program x20 reps passively/stretching                      Time Tracking:     PT Received On: 18  PT Start Time: 1022     PT Stop Time: 1053  PT Total Time (min): 31 min     Billable Minutes: Therapeutic Activity 23 and Therapeutic Exercise 8    Treatment  Type: Treatment  PT/PTA: PT           Rafaela Pickens, PT  05/04/2018

## 2018-05-04 NOTE — PLAN OF CARE
Problem: Nutrition, Parenteral (Adult)  Intervention: Monitor/Manage Parenteral Nutrition Support  Recommendation/Intervention:3) Continue  Clinimix E 2.75/5 @ 100 mls/hr and add 20% lipids (250 mls). Provides 1172 calories, 66 gms protein, 2400 mls fluid, GIR 1.02 (55% of EEN).  VS Clinimix E 5/15 @ 90 mls/hr with 20% lipids (250 mls). Provides 2034 calories, 108 gms protein, 2160 mls fluid, GIR 2.76 (100% of EEN)  2) progress to soft/bland diet per pt tolerance   3) Add Boost Plus with meals   Goals: Pt will receive at least 85% EEN within 48 hrs.   Nutrition Goal Status: new  Communication of RAMILA Recs:  (POC, sticky note)

## 2018-05-04 NOTE — PLAN OF CARE
Problem: Patient Care Overview  Goal: Plan of Care Review  Outcome: Ongoing (interventions implemented as appropriate)  Plan of care reviewed with patient he verbalized understanding. Alert and oriented cont on Clinimix no ADR noted. Pt is NPO with ice chips only. Medicated for nausea was effective. No more episodes of emesis other than this am. Cont on IV ABT no ADR noted. Worked with PT/OT continue to encourage IS at bedside. Call light within reach will cont to monitor.

## 2018-05-05 NOTE — PLAN OF CARE
05/05/18 0808   Patient Assessment/Suction   Level of Consciousness (AVPU) alert   All Lung Fields Breath Sounds diminished   PRE-TX-O2-ETCO2   O2 Device (Oxygen Therapy) nasal cannula   $ Is the patient on Low Flow Oxygen? Yes   Flow (L/min) 5   Oxygen Concentration (%) 40   SpO2 95 %   Pulse Oximetry Type Intermittent   $ Pulse Oximetry - Multiple Charge Pulse Oximetry - Multiple   Pulse 72   Resp 18   Incentive Spirometer   $ Incentive Spirometer Charges done with encouragement   Administration (Incentive Spirometer) done with encouragement   Number of Repetitions (Incentive Spirometer) 8   Level (mL) (Incentive Spirometer) 750   Patient Tolerance good   Ready to Wean/Extubation Screen   FIO2<=50 (chart decimal) 0.4

## 2018-05-05 NOTE — PLAN OF CARE
Problem: Patient Care Overview  Goal: Plan of Care Review  Outcome: Ongoing (interventions implemented as appropriate)  Pt resting in bed, easily aroused. Alert to self, occasional confusion noted this shift. Dx: s/p exp lap with lysis of adhesion. Plan of care reviewed. No evidence of learning noted. Telemetry. Clinimix infusing @100 cc/hr. 4 lap sites to abd. Drsgs c/d/i. Gonzales. O2 @ 3L/min per n/c. Edema noted to all extremities & scrotum. Incont of bowel. Pericare given as needed. Turn q2hrs. PT/OT. Clear liquid diet. No emesis noted. No c/o pain or distress at this time. Call light in reach. Bed alarm in place. Will cont to monitor.

## 2018-05-05 NOTE — ASSESSMENT & PLAN NOTE
GI function returning heralded by flatus and bm  Advance diet as tolerated  Ok to dc if tolerates and clear with medical team

## 2018-05-05 NOTE — SUBJECTIVE & OBJECTIVE
Interval History: having flatus, no n/v    Medications:  Continuous Infusions:   amino acid 2.75% - dextrose 5% (CLINIMIX-E) solution with additives (1L provides 27.5 gm AA, 50 gm CHO (170 kcal/L dextrose), Na 35, K 30, Mg 5, Ca 4.5, Acetate 51, Cl 39, Phos 15) 100 mL/hr at 05/04/18 2204     Scheduled Meds:   ciprofloxacin (CIPRO)400mg/200ml D5W IVPB  400 mg Intravenous Q12H    docusate sodium  100 mg Oral BID    enoxaparin  40 mg Subcutaneous Daily    metoclopramide HCl  10 mg Intravenous Q6H    pantoprazole  40 mg Intravenous BID    piperacillin-tazobactam (ZOSYN) IVPB  3.375 g Intravenous Q6H    polyethylene glycol  17 g Oral BID    tamsulosin  0.4 mg Oral Daily     PRN Meds:aluminum-magnesium hydroxide-simethicone, bisacodyl, HYDROmorphone, ondansetron, pneumoc 13-lopez conj-dip cr(PF), ramelteon, sodium chloride 0.9%     Review of patient's allergies indicates:  No Known Allergies  Objective:     Vital Signs (Most Recent):  Temp: 98.3 °F (36.8 °C) (05/05/18 0358)  Pulse: 72 (05/05/18 0808)  Resp: 18 (05/05/18 0808)  BP: 130/69 (05/05/18 0358)  SpO2: 95 % (05/05/18 0808) Vital Signs (24h Range):  Temp:  [96.7 °F (35.9 °C)-98.5 °F (36.9 °C)] 98.3 °F (36.8 °C)  Pulse:  [65-73] 72  Resp:  [14-20] 18  SpO2:  [92 %-95 %] 95 %  BP: (104-133)/(53-82) 130/69     Weight: 84.7 kg (186 lb 11.7 oz)  Body mass index is 25.33 kg/m².    Intake/Output - Last 3 Shifts       05/03 0700 - 05/04 0659 05/04 0700 - 05/05 0659 05/05 0700 - 05/06 0659    P.O. 60 360     IV Piggyback 550 300     TPN 1460      Total Intake(mL/kg) 2070 (24.4) 660 (7.8)     Urine (mL/kg/hr) 1150 (0.6) 2000 (1)     Total Output 1150 2000      Net +920 -1340             Stool Occurrence 2 x 3 x           Physical Exam   Pulmonary/Chest: No respiratory distress.   Abdominal: Soft. Bowel sounds are normal. He exhibits distension (mild distension). He exhibits no mass. There is no tenderness. There is no rebound and no guarding. No hernia.    Musculoskeletal: He exhibits edema.       Significant Labs:  CBC:   Recent Labs  Lab 05/05/18  0501   WBC 12.90*   RBC 3.56*   HGB 11.2*   HCT 33.7*      MCV 95   MCH 31.6*   MCHC 33.3     BMP:   Recent Labs  Lab 05/05/18  0501   *   *   K 4.4      CO2 25   BUN 15   CREATININE 0.7   CALCIUM 7.9*   MG 1.7       Significant Diagnostics:  I have reviewed and interpreted all pertinent imaging results/findings within the past 24 hours.

## 2018-05-05 NOTE — PROGRESS NOTES
[facility] Surgery  Progress Note    Subjective:     History of Present Illness:  Patient is a 89 y.o. male admitted to Hospitalist Service from [facility] Emergency Room with complaint of vomiting for 2 days and dysuria. 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 Part of the history obtained from patient's wife. Patient has been taking PO Keflex for UTI diagnosed 4 days ago.  presents to the ED with vomiting, constipation,frequent urination, decreased urination, decreased appetite with an onset x 1 day PTA. The patient reports that he was recently discharged from the hospital x 5 days ago for a UTI. Patient reports that he has been taking his antibiotics (500mg Keflex)  as directed, but believes they are making him feel badly. He states that he began vomiting yesterday. He denies nausea at this time .  He states that the vomit was clear. Patient states that he has not eaten in two days. He claims that he has not had a bowel movement in 2-3 days. Patient denies cough, swelling,hematuria, hematemesis, bright red blood in stool, or any other symptoms at this time.  Family reports that they are concerned that the patient may have diabetes. They report that the patient has not seen his PCP since August 2017. No bed sores reported. Patient denied chest pain, shortness of breath, headache, vision changes, focal neuro-deficits, cough or fever.    Post-Op Info:  Procedure(s) (LRB):  EXPLORATORY-LAPAROTOMY (N/A)   6 Days Post-Op     Interval History: having flatus, no n/v    Medications:  Continuous Infusions:   amino acid 2.75% - dextrose 5% (CLINIMIX-E) solution with additives (1L provides 27.5 gm AA, 50 gm CHO (170 kcal/L dextrose), Na 35, K 30, Mg 5, Ca 4.5, Acetate 51, Cl 39, Phos 15) 100 mL/hr at 05/04/18 2204     Scheduled Meds:   ciprofloxacin (CIPRO)400mg/200ml  D5W IVPB  400 mg Intravenous Q12H    docusate sodium  100 mg Oral BID    enoxaparin  40 mg Subcutaneous Daily    metoclopramide HCl  10 mg Intravenous Q6H    pantoprazole  40 mg Intravenous BID    piperacillin-tazobactam (ZOSYN) IVPB  3.375 g Intravenous Q6H    polyethylene glycol  17 g Oral BID    tamsulosin  0.4 mg Oral Daily     PRN Meds:aluminum-magnesium hydroxide-simethicone, bisacodyl, HYDROmorphone, ondansetron, pneumoc 13-lopez conj-dip cr(PF), ramelteon, sodium chloride 0.9%     Review of patient's allergies indicates:  No Known Allergies  Objective:     Vital Signs (Most Recent):  Temp: 98.3 °F (36.8 °C) (05/05/18 0358)  Pulse: 72 (05/05/18 0808)  Resp: 18 (05/05/18 0808)  BP: 130/69 (05/05/18 0358)  SpO2: 95 % (05/05/18 0808) Vital Signs (24h Range):  Temp:  [96.7 °F (35.9 °C)-98.5 °F (36.9 °C)] 98.3 °F (36.8 °C)  Pulse:  [65-73] 72  Resp:  [14-20] 18  SpO2:  [92 %-95 %] 95 %  BP: (104-133)/(53-82) 130/69     Weight: 84.7 kg (186 lb 11.7 oz)  Body mass index is 25.33 kg/m².    Intake/Output - Last 3 Shifts       05/03 0700 - 05/04 0659 05/04 0700 - 05/05 0659 05/05 0700 - 05/06 0659    P.O. 60 360     IV Piggyback 550 300     TPN 1460      Total Intake(mL/kg) 2070 (24.4) 660 (7.8)     Urine (mL/kg/hr) 1150 (0.6) 2000 (1)     Total Output 1150 2000      Net +920 -1340             Stool Occurrence 2 x 3 x           Physical Exam   Pulmonary/Chest: No respiratory distress.   Abdominal: Soft. Bowel sounds are normal. He exhibits distension (mild distension). He exhibits no mass. There is no tenderness. There is no rebound and no guarding. No hernia.   Musculoskeletal: He exhibits edema.       Significant Labs:  CBC:   Recent Labs  Lab 05/05/18  0501   WBC 12.90*   RBC 3.56*   HGB 11.2*   HCT 33.7*      MCV 95   MCH 31.6*   MCHC 33.3     BMP:   Recent Labs  Lab 05/05/18  0501   *   *   K 4.4      CO2 25   BUN 15   CREATININE 0.7   CALCIUM 7.9*   MG 1.7       Significant  Diagnostics:  I have reviewed and interpreted all pertinent imaging results/findings within the past 24 hours.    Assessment/Plan:     SBO (small bowel obstruction)    GI function returning heralded by flatus and bm  Advance diet as tolerated  Ok to dc if tolerates and clear with medical team          Non-intractable vomiting with nausea    Appears to have resolved            Zachary Atwood MD  General Surgery  [facility]

## 2018-05-05 NOTE — NURSING
Notified Dr. Palomo of 9 beat V tach pt stated he just doesn't feel well. VS taken resp called for prn. Call light within reach will cont to monitor.

## 2018-05-05 NOTE — PLAN OF CARE
05/05/18 1020   Patient Assessment/Suction   Level of Consciousness (AVPU) alert   All Lung Fields Breath Sounds diminished   PRE-TX-O2-ETCO2   O2 Device (Oxygen Therapy) nasal cannula   $ Is the patient on Low Flow Oxygen? Yes   Flow (L/min) 5   Oxygen Concentration (%) 40   SpO2 95 %   Pulse Oximetry Type Intermittent   $ Pulse Oximetry - Multiple Charge Pulse Oximetry - Multiple   Pulse 78   Resp 18   Aerosol Therapy   $ Aerosol Therapy Charges Aerosol Treatment   Respiratory Treatment Status given   SVN/Inhaler Treatment Route mask   Position During Treatment HOB at 45 degrees   Patient Tolerance good   Post-Treatment   Post-treatment Heart Rate (beats/min) 82   Post-treatment Resp Rate (breaths/min) 18   All Fields Breath Sounds unchanged   Ready to Wean/Extubation Screen   FIO2<=50 (chart decimal) 0.4

## 2018-05-05 NOTE — PROGRESS NOTES
Progress Note    Admit Date: 4/26/2018   LOS: 8 days     SUBJECTIVE:     Interval history: Currently pt has a sensation of fullness in his abdomen. He has had a BM since yesterday.     Scheduled Meds:   ciprofloxacin (CIPRO)400mg/200ml D5W IVPB  400 mg Intravenous Q12H    docusate sodium  100 mg Oral BID    enoxaparin  40 mg Subcutaneous Daily    metoclopramide HCl  10 mg Intravenous Q6H    pantoprazole  40 mg Intravenous BID    piperacillin-tazobactam (ZOSYN) IVPB  3.375 g Intravenous Q6H    polyethylene glycol  17 g Oral BID    tamsulosin  0.4 mg Oral Daily     Continuous Infusions:   amino acid 2.75% - dextrose 5% (CLINIMIX-E) solution with additives (1L provides 27.5 gm AA, 50 gm CHO (170 kcal/L dextrose), Na 35, K 30, Mg 5, Ca 4.5, Acetate 51, Cl 39, Phos 15) 100 mL/hr at 05/04/18 2204     PRN Meds:albuterol, aluminum-magnesium hydroxide-simethicone, bisacodyl, HYDROmorphone, ondansetron, pneumoc 13-lopez conj-dip cr(PF), ramelteon, sodium chloride 0.9%    Review of patient's allergies indicates:  No Known Allergies    Review of Systems  Positive for abdominal fullness. Negative for CP    OBJECTIVE:     Vital Signs (Most Recent)  Temp: 98.1 °F (36.7 °C) (05/05/18 1534)  Pulse: 72 (05/05/18 1534)  Resp: 18 (05/05/18 1534)  BP: (!) 148/76 (05/05/18 1534)  SpO2: 95 % (05/05/18 1534)    Vital Signs Range (Last 24H):  Temp:  [96.7 °F (35.9 °C)-98.3 °F (36.8 °C)]   Pulse:  [69-82]   Resp:  [18-20]   BP: (126-148)/(63-76)   SpO2:  [87 %-95 %]     I & O (Last 24H):  Intake/Output Summary (Last 24 hours) at 05/05/18 1730  Last data filed at 05/05/18 0600   Gross per 24 hour   Intake              660 ml   Output             2000 ml   Net            -1340 ml     Physical Exam:  General appearance: well developed, appears stated age  Lungs:  clear to auscultation bilaterally and normal respiratory effort  Chest wall: no tenderness  Heart: regular rate and rhythm, S1, S2 normal, no murmur, click, rub or  gallop  Abdomen: soft, non-tender non-distented; bowel sounds hypoactive; no masses,  no organomegaly. Abdominal dressings C/D/I.  Extremities: no cyanosis, clubbing. 2+ edema.   Pulses: 2+ and symmetric    Laboratory:  CBC:   Recent Labs  Lab 05/05/18  0501   WBC 12.90*   RBC 3.56*   HGB 11.2*   HCT 33.7*      MCV 95   MCH 31.6*   MCHC 33.3     BMP:   Recent Labs  Lab 05/05/18  0501   *   *   K 4.4      CO2 25   BUN 15   CREATININE 0.7   CALCIUM 7.9*   MG 1.7     CMP:   Recent Labs  Lab 05/05/18  0501   *   CALCIUM 7.9*   ALBUMIN 1.9*   PROT 5.0*   *   K 4.4   CO2 25      BUN 15   CREATININE 0.7   ALKPHOS 51*   ALT 22   AST 31   BILITOT 1.0       ASSESSMENT/PLAN:     Small Bowel Obstruction s/p exploratory laparotomy, adhesiolysis with extensive adhesions with release of SBO.  Post-op Ileus  Continue to follow Dr. ePña's recommendations. Diet advancement as per Dr. Peña. On IV Clinimix.    Needs aggressive incentive spirometry.  Follow hemoglobin and hematocrit closely.  Pain control with PO narcotics and antiemetics as needed.      Yes     Malnutrition of moderate degree [E44.0]  Diet supplementation to be given once PO intake starts. Will encourage PO and monitor closely for weight changes.      Yes    Hepatitis C [B19.20]  Trend LFTs.     UTI  Urine C/S - NTD. Continue IV Cipro.      Yes    CAD (coronary artery disease) [I25.10]  Patient with known CAD and monitor for S/Sx of angina/ACS. Continue to monitor on telemetry.      Yes    History of hypertension [Z86.79]  Continue chronic medications and monitor for any changes, adjusting as needed.      Not Applicable    Paraplegia [G82.20]  Supportive care.  Fall precautions.  Skin care.   Yes          DVT prophylaxis: Lovenox 40 mg SQ q day.     Portions of this note were created using Dragon voice recognition software. There may be voice recognition errors found in the text, and attempts were made to correct these  errors prior to signature    Collin Palomo MD    Family Medicine  5/5/2018

## 2018-05-05 NOTE — PROGRESS NOTES
05/04/18 2343   Patient Assessment/Suction   Level of Consciousness (AVPU) alert   PRE-TX-O2-ETCO2   O2 Device (Oxygen Therapy) nasal cannula w/ humidification   Flow (L/min) 5   Oxygen Concentration (%) 40   SpO2 (!) 92 %   Pulse Oximetry Type Intermittent   Pulse 73   Resp 18   Incentive Spirometer   $ Incentive Spirometer Charges other (see comments)  (pt sleeping)   Ready to Wean/Extubation Screen   FIO2<=50 (chart decimal) 0.4

## 2018-05-05 NOTE — PT/OT/SLP PROGRESS
"Physical Therapy      Patient Name:  Yamil Santiago Jr.   MRN:  [id number]    Patient not seen today secondary to Other (Comment) (pt stated," I would like to but I'm just not breathing right." SOB noted, RT present for breathing treatment. Pt repositioned to HOB, HOB elevated with A of 2. Pt able to express some relief.). Will follow-up as schedule permits.    Addendum: attempted treatment again at 1210. Pt stated he still feels short of breath and bloated and maybe moving/exercising his legs will help. Nurse Jody then present and advised pt to rest 2' arrhythmia and low O2 sats. Pt verbalized understanding.    Anjana Keen, PTA    "

## 2018-05-06 NOTE — SUBJECTIVE & OBJECTIVE
Interval History: transferred to ICU for tachypnea.  Called last night, and told abdomen was distended painful and tender so I recommended NGT.  This morning patient has no abdominal pain and reports really none last night.  He has been having flatus and bm.  Does not feel abdomen is bloated.  Does report he is breathing better on bi pap mask.  NGT has little to no output.    Medications:  Continuous Infusions:   amino acid 2.75% - dextrose 5% (CLINIMIX-E) solution with additives (1L provides 27.5 gm AA, 50 gm CHO (170 kcal/L dextrose), Na 35, K 30, Mg 5, Ca 4.5, Acetate 51, Cl 39, Phos 15) 100 mL/hr at 05/05/18 2107     Scheduled Meds:   albuterol sulfate        ciprofloxacin (CIPRO)400mg/200ml D5W IVPB  400 mg Intravenous Q12H    docusate sodium  100 mg Oral BID    enoxaparin  40 mg Subcutaneous Daily    metoclopramide HCl  10 mg Intravenous Q6H    pantoprazole  40 mg Intravenous BID    piperacillin-tazobactam (ZOSYN) IVPB  3.375 g Intravenous Q6H    polyethylene glycol  17 g Oral BID    sodium chloride 0.9%        tamsulosin  0.4 mg Oral Daily    vancomycin (VANCOCIN) IVPB (custom)  15 mg/kg Intravenous Q12H     PRN Meds:albuterol, aluminum-magnesium hydroxide-simethicone, bisacodyl, HYDROmorphone, ondansetron, pneumoc 13-lopez conj-dip cr(PF), ramelteon, sodium chloride 0.9%     Review of patient's allergies indicates:  No Known Allergies  Objective:     Vital Signs (Most Recent):  Temp: 97.5 °F (36.4 °C) (05/06/18 0745)  Pulse: 62 (05/06/18 0800)  Resp: (!) 23 (05/06/18 0800)  BP: (!) 112/57 (05/06/18 0800)  SpO2: (!) 94 % (05/06/18 0800) Vital Signs (24h Range):  Temp:  [96.9 °F (36.1 °C)-98.1 °F (36.7 °C)] 97.5 °F (36.4 °C)  Pulse:  [60-86] 62  Resp:  [18-35] 23  SpO2:  [87 %-100 %] 94 %  BP: (107-167)/(57-87) 112/57     Weight: 89.6 kg (197 lb 8.5 oz)  Body mass index is 26.79 kg/m².    Intake/Output - Last 3 Shifts       05/04 0700 - 05/05 0659 05/05 0700 - 05/06 0659 05/06 0700 - 05/07 0659     P.O. 360 425     I.V. (mL/kg)   200 (2.2)    IV Piggyback 300      Total Intake(mL/kg) 660 (7.8) 425 (4.7) 200 (2.2)    Urine (mL/kg/hr) 2000 (1) 2455 (1.1) 40 (0.2)    Total Output 2000 2455 40    Net -1340 -2030 +160           Stool Occurrence 3 x 3 x 0 x          Physical Exam   Constitutional: No distress.   On bipap   Eyes: EOM are normal. Right eye exhibits no discharge. Left eye exhibits no discharge.   Neck: No JVD present. No tracheal deviation present.   Cardiovascular: Normal rate and regular rhythm.    Pulmonary/Chest: No respiratory distress. He has no wheezes. He has no rales.   Abdominal: Soft. He exhibits distension (again very mild distension). He exhibits no mass. There is no tenderness (no tenderness). There is no rebound and no guarding. No hernia.   Musculoskeletal: He exhibits edema.   Neurological: He is alert.   Skin: Skin is warm and dry.       Significant Labs:  CBC:   Recent Labs  Lab 05/06/18  0323   WBC 21.10*   RBC 3.52*   HGB 11.0*   HCT 32.8*      MCV 93   MCH 31.4*   MCHC 33.6     ABGs:   Recent Labs  Lab 05/06/18  0148   PH 7.346*   PCO2 48.8*   PO2 107*   HCO3 26.7   POCSATURATED 98   BE 1       Significant Diagnostics:  CT: I have reviewed all pertinent results/findings within the past 24 hours and my personal findings are:  NO PE, bilateral effusion and possible consolidation

## 2018-05-06 NOTE — PROGRESS NOTES
Pt appeared comfortable on bipap, RT called to place pt back on vapotherm. RT at , pt placed on vapotherm. Pt's O2 sat immediately dropped into the 80s, pt placed back on bipap at 100% FiO2, after several minutes O2 sat recovered in the 93-96% range. Will continue to monitor pt closely.    Shubham Maxwell RN

## 2018-05-06 NOTE — EICU
eICU Note :    Called by the [facility] eRN:    Problem: Respiratory Distress and Hypoxia     Pertinent History and labs reviewed : 89 y/o M  With H/o Paraplegia , SBO s/p Exploratory Laparotomy , Hep C , CAD , HTN the patient is tachypneic using accessory muscles . ABG shows 7.38/41.4/59/24.6/90%chest X-ray : Bilateral Infiltrates possible aspiration pneumonia  Treatment /Intervention given: Agree with transfer to ICU, BIPAP ,settingsper RT,lactic acid and BNP ,CT angio to r/o PE (I doubt it is PE due to PCO2 being normal)        Maria Isabel Escobar M.D  eICU Physician

## 2018-05-06 NOTE — SIGNIFICANT EVENT
Contacted by RN that patient is tachypneic and requiring increase in oxygen to maintain O2 sat @92%.  Went to bedside.  Patient reports feeling short of breath with associated abdominal pain.  Denies chest pain.    PE: AAOx3  Cardiac: RRR, no murmurs, no gallops, rate 87  Resp: tachypneic, accessory muscle use, Right sided rhonchi noted in base of lungs, left clear.  Abdomen: firm, tender to touch, B/S +  Extremities: LUE edema, BLE edema    Plan:  ABG   CXR  Procalcitonin  Lactic Acid  BMP, CBC  BNP  Initiate IV Vancomycin  Transfer to ICU - place on Bipap.  Recheck ABG 1hour  CTA chest -stat  Ultrasound of left upper extremity    AB.383     POC PCO2 41.4    POC PO2 59     POC HCO3 24.6    POC BE 0    POC SATURATED O2 90     POC TCO2 26      CXR: IMPRESSION:  1. Nonspecific bibasilar consolidations are present, pneumonia versus atelectasis.  2. Pulmonary vascular congestion.      Consulted with Dr. Atwood - recommended NGT for distention and if appears septic to order CT scan of abdomen.    Consuilted with Dr. Eaton - eICU - CXR positive for aspiration pneumonia.  Agrees with plan to CTA chest, place on bipap and repeat ABG, and add Vanc to regimen.

## 2018-05-06 NOTE — PROGRESS NOTES
Patient has developed worsening respiratory failure, prompting intubation with mechanical ventilation. He is severely hypoxemic and his CXR post intubaiton confirms the development of ARDS.  Ventilation adjusted to low volume settings.  Prognosis for surviving ARDS in this elderly debilitated patient is not good.

## 2018-05-06 NOTE — PROGRESS NOTES
05/05/18 2110   PRE-TX-O2-ETCO2   O2 Device (Oxygen Therapy) venti mask   Flow (L/min) 12   Oxygen Concentration (%) 50   SpO2 (!) 93 %   Pulse Oximetry Type Continuous   $ Pulse Oximetry - Multiple Charge Pulse Oximetry - Multiple   Pulse 78   Ready to Wean/Extubation Screen   FIO2<=50 (chart decimal) 0.5   set up continuous pulse ox.

## 2018-05-06 NOTE — PLAN OF CARE
Problem: Patient Care Overview  Goal: Plan of Care Review  Outcome: Ongoing (interventions implemented as appropriate)  Changed pt to comfort flow 40lpm. Txs changed to PRN. Artline inserted without incedent. Pt tolerated well.

## 2018-05-06 NOTE — PROGRESS NOTES
05/06/18 0140   Patient Assessment/Suction   Level of Consciousness (AVPU) alert   Respiratory Effort Mild;Labored   Expansion/Accessory Muscles/Retractions no use of accessory muscles;no retractions;expansion symmetric   All Lung Fields Breath Sounds diminished   Rhythm/Pattern, Respiratory mechanical device   Cough Frequency infrequent   Cough Type nonproductive;fair   Suction Method not required   PRE-TX-O2-ETCO2   O2 Device (Oxygen Therapy) BiPAP   Oxygen Concentration (%) 80   SpO2 99 %   Pulse Oximetry Type Continuous   $ Pulse Oximetry - Multiple Charge Pulse Oximetry - Multiple   Oximetry Probe Site Intact;No Change Needed   Pulse 77   Resp (!) 22   Ready to Wean/Extubation Screen   FIO2<=50 (chart decimal) (!) 0.8   Preset CPAP/BiPAP Settings   Mode Of Delivery BiPAP S/T   $ CPAP/BiPAP Daily Charge BiPAP/CPAP Daily   $ Initial CPAP/BiPAP Setup? Yes   $ Is patient using? Yes   Equipment Type V60   Airway Device Type medium nasal mask   Humidifier not applicable   Ipap 12   EPAP (cm H2O) 6   Pressure Support (cm H2O) 6   Set Rate (Breaths/Min) 10   ITime (sec) 1   Rise Time (sec) 0.2   Patient CPAP/BiPAP Settings   RR Total (Breaths/Min) 22   Tidal Volume (mL) 420   VE Minute Ventilation (L/min) 9.6 L/min   Peak Inspiratory Pressure (cm H2O) 12   TiTOT (%) 29   Total Leak (L/Min) 6   Patient Trigger - ST Mode Only (%) 99   CPAP/BiPAP Alarms   High Pressure (cm H2O) 20   Low Pressure (cm H2O) 10   Low Pressure Delay (Sec) 20   Minute Ventilation (L/Min) 3   High RR (breaths/min) 45   Low RR (breaths/min) 10

## 2018-05-06 NOTE — PLAN OF CARE
Problem: Patient Care Overview  Goal: Plan of Care Review  Outcome: Ongoing (interventions implemented as appropriate)  Attempted to wean pt off bipap this shift. After being on comfortflow for several hours, he appeared to be having a more difficult time breathing. He was placed back on bipap and within the hour he was more comfortable and breathing easier. He did receive albumin and lasix today w/ roughly 2l on u/o noted this shift. He's remained free from falls/injuries this shift.

## 2018-05-06 NOTE — SIGNIFICANT EVENT
Received pt this evening- day shift reports pt with increasing SOB, tachypnea and accessory muscle use during respirations.  Had to be put on venti mask throughout the day to maintain adequate O2 saturation.  By change of shift, pt had been weaned back to Nasal cannula, and was maintaining sat of 93-94%.    Later rounds, pt not maintaining sat above 88 with NC at 5l, continues to be tachypnic, with increase in accessory muscle use.  Pt having a hard time speaking he is so SOB.  SARAH Morales notified, came to see pt and ABG's and CXR ordered.  Critical PO2 noted on ABG, order for xfr to ICU for this pt for bipap.  Dr. Atwood made aware of situation and has spoken to SARAH Morales.   Report given to ICU RN.  Pt's wife made aware of transfer and update given to her.

## 2018-05-06 NOTE — PROGRESS NOTES
[facility] Surgery  Progress Note    Subjective:     History of Present Illness:  Patient is a 89 y.o. male admitted to Hospitalist Service from [facility] Emergency Room with complaint of vomiting for 2 days and dysuria. 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 Part of the history obtained from patient's wife. Patient has been taking PO Keflex for UTI diagnosed 4 days ago.  presents to the ED with vomiting, constipation,frequent urination, decreased urination, decreased appetite with an onset x 1 day PTA. The patient reports that he was recently discharged from the hospital x 5 days ago for a UTI. Patient reports that he has been taking his antibiotics (500mg Keflex)  as directed, but believes they are making him feel badly. He states that he began vomiting yesterday. He denies nausea at this time .  He states that the vomit was clear. Patient states that he has not eaten in two days. He claims that he has not had a bowel movement in 2-3 days. Patient denies cough, swelling,hematuria, hematemesis, bright red blood in stool, or any other symptoms at this time.  Family reports that they are concerned that the patient may have diabetes. They report that the patient has not seen his PCP since August 2017. No bed sores reported. Patient denied chest pain, shortness of breath, headache, vision changes, focal neuro-deficits, cough or fever.    Post-Op Info:  Procedure(s) (LRB):  EXPLORATORY-LAPAROTOMY (N/A)   7 Days Post-Op     Interval History: transferred to ICU for tachypnea.  Called last night, and told abdomen was distended painful and tender, might be causing respiratory changes so I recommended NGT, possible ct scan.  This morning patient has no abdominal pain and reports really none last night, but endorses short ness of breath.  He has been having flatus and  bm.  Does not feel abdomen is bloated.  Does report he is breathing better on bi pap mask.  NGT has little to no output.    Medications:  Continuous Infusions:   amino acid 2.75% - dextrose 5% (CLINIMIX-E) solution with additives (1L provides 27.5 gm AA, 50 gm CHO (170 kcal/L dextrose), Na 35, K 30, Mg 5, Ca 4.5, Acetate 51, Cl 39, Phos 15) 100 mL/hr at 05/05/18 2107     Scheduled Meds:   albuterol sulfate        ciprofloxacin (CIPRO)400mg/200ml D5W IVPB  400 mg Intravenous Q12H    docusate sodium  100 mg Oral BID    enoxaparin  40 mg Subcutaneous Daily    metoclopramide HCl  10 mg Intravenous Q6H    pantoprazole  40 mg Intravenous BID    piperacillin-tazobactam (ZOSYN) IVPB  3.375 g Intravenous Q6H    polyethylene glycol  17 g Oral BID    sodium chloride 0.9%        tamsulosin  0.4 mg Oral Daily    vancomycin (VANCOCIN) IVPB (custom)  15 mg/kg Intravenous Q12H     PRN Meds:albuterol, aluminum-magnesium hydroxide-simethicone, bisacodyl, HYDROmorphone, ondansetron, pneumoc 13-lopez conj-dip cr(PF), ramelteon, sodium chloride 0.9%     Review of patient's allergies indicates:  No Known Allergies  Objective:     Vital Signs (Most Recent):  Temp: 97.5 °F (36.4 °C) (05/06/18 0745)  Pulse: 62 (05/06/18 0800)  Resp: (!) 23 (05/06/18 0800)  BP: (!) 112/57 (05/06/18 0800)  SpO2: (!) 94 % (05/06/18 0800) Vital Signs (24h Range):  Temp:  [96.9 °F (36.1 °C)-98.1 °F (36.7 °C)] 97.5 °F (36.4 °C)  Pulse:  [60-86] 62  Resp:  [18-35] 23  SpO2:  [87 %-100 %] 94 %  BP: (107-167)/(57-87) 112/57     Weight: 89.6 kg (197 lb 8.5 oz)  Body mass index is 26.79 kg/m².    Intake/Output - Last 3 Shifts       05/04 0700 - 05/05 0659 05/05 0700 - 05/06 0659 05/06 0700 - 05/07 0659    P.O. 360 425     I.V. (mL/kg)   200 (2.2)    IV Piggyback 300      Total Intake(mL/kg) 660 (7.8) 425 (4.7) 200 (2.2)    Urine (mL/kg/hr) 2000 (1) 2455 (1.1) 40 (0.2)    Total Output 2000 2455 40    Net -1340 -2030 +160           Stool Occurrence 3 x 3 x 0 x           Physical Exam   Constitutional: No distress.   On bipap   Eyes: EOM are normal. Right eye exhibits no discharge. Left eye exhibits no discharge.   Neck: No JVD present. No tracheal deviation present.   Cardiovascular: Normal rate and regular rhythm.    Pulmonary/Chest: No respiratory distress. He has no wheezes. He has no rales.   Abdominal: Soft. He exhibits distension (again very mild distension). He exhibits no mass. There is no tenderness (no tenderness). There is no rebound and no guarding. No hernia.   Musculoskeletal: He exhibits edema.   Neurological: He is alert.   Skin: Skin is warm and dry.       Significant Labs:  CBC:   Recent Labs  Lab 05/06/18  0323   WBC 21.10*   RBC 3.52*   HGB 11.0*   HCT 32.8*      MCV 93   MCH 31.4*   MCHC 33.6     ABGs:   Recent Labs  Lab 05/06/18  0148   PH 7.346*   PCO2 48.8*   PO2 107*   HCO3 26.7   POCSATURATED 98   BE 1       Significant Diagnostics:  CT: I have reviewed all pertinent results/findings within the past 24 hours and my personal findings are:  NO PE, bilateral effusion and possible consolidation    Assessment/Plan:     SBO (small bowel obstruction)    I do not suspect he is obstructed as he is having gas and bm, abdomen clinically benign.  Given rising wbc will check ct abd and check stool for C. Diff.            Non-intractable vomiting with nausea    Appears to have resolved            Zachary Atwood MD  General Surgery  [facility]

## 2018-05-06 NOTE — CONSULTS
REASON:  Pneumonia and acute on chronic hypoxic respiratory failure.    HISTORY OF PRESENT ILLNESS:  The patient is an 89-year-old patient who has been   admitted to the hospital for the last week with vomiting and constipation.  The   patient was found to have a small bowel obstruction, brought to the OR on   04/29/2018.  The patient has had a very slow recovery and yesterday developed   recurrent vomiting with possible aspiration with increased dyspnea and   hypoxemia.  A CTA was done, which shows significant bilateral pleural effusions,   bilateral atelectasis and possibly some infiltrates.    PAST MEDICAL HISTORY:  Significant for paraplegia since the 1960s secondary to a   C4 accident, hepatitis C, urinary tract infections, small bowel obstruction,   malnutrition, coronary artery disease, hypertension and dizziness.    PAST SURGICAL HISTORY:  Significant for the laparoscopy with lysis of adhesions.    ALLERGIES:  Listed as none.    MEDICATIONS:  At this time, I have just ordered 50 g of albumin.  He is   receiving Cipro IV, docusate, enoxaparin, Lasix that I have ordered Reglan 10 q.   6, pantoprazole, pip-tazo, MiraLax, Flomax and vancomycin.  He is also   receiving PPN, which has been going through a peripheral line.    SOCIAL HISTORY:  The patient is .  I do not have anything more than that   at this time.    FAMILY HISTORY:  Noncontributory.    REVIEW OF SYSTEMS:  Impossible given the BiPAP and the patient is in lethargic   state.    PHYSICAL EXAMINATION:  VITAL SIGNS:  The temperature is 96.9, heart rate 68, respiratory rate 32, blood   pressure 156/75, sats are 98% on BiPAP.  HEENT:  The pupils are reactive.  The extraocular movements are intact.  The   nose and mouth are covered with BiPAP.  There is a new right triple lumen IJ   catheter.  Nose:  There is right-sided NG tube to suction.  HEART:  Has a regular rate and rhythm.  LUNGS:  Densely consolidated in the bases bilaterally.  There are  crackles above   this.  There are no rhonchi auscultated.  ABDOMEN:  Has multiple laparoscopies sites.  Abdomen is distended.  The bowel   sounds are diminished to absent.  There is no acute tenderness of the abdomen,   but it was not firmly palpated either because of his nausea and vomiting,   especially with ____ BiPAP.  GENITOURINARY:  There is a Gonzales in place with yellow urine.  EXTREMITIES:  There is significant edema to the right leg, there is less to the   left.  There is decreased muscle mass to both legs and deformity of the feet   from nonweightbearing over the years.  SKIN:  Intact with the exception of the laparoscopy scars and where it is   weeping from sticks.  There are multiple ecchymoses to the skin bilaterally.    There is edema to the arms and legs and presacral area.  NEUROLOGIC:  The patient appears to be intact mentally, but is lethargic.  He is   paraplegic, which is longstanding.  Psychiatrically, I cannot evaluate.  LYMPHATICS:  The patient has anasarca.    LABORATORY DATA:  The white count is 21.1, hemoglobin 11, hematocrit 33,   platelets 175.  The chemistry shows sodium of 127, potassium 4.4, chloride 101,   CO2 21, BUN 10, creatinine 0.6, glucose 119, albumin is 1.9, AST is 32, ALT is   22.  The BNP yesterday was 231.  ABG this morning on BiPAP 12/6 and FIO2 of 90%,   had a pH of 7.35, pCO2 of 49, pO2 of 107, bicarbonate of 98%.    IMAGING:  The CT as reported, the chest x-ray from this morning shows that the   line is in good place.  There are bilateral pleural effusions.  There is a large   amount of left lower lobe atelectasis.  There is increased right upper lobe and   left upper lobe infiltrates, which have progressed dramatically since his   admission.    PLAN:  To try to obtain a sputum culture.  Continue broad-spectrum antibiotics.    Make sure blood cultures x2 have been performed, try to change the patient to   high flow O2, so that he can cough and not vomit under BiPAP mask,  give him 50 g   of albumin and 40 of Lasix to try to get rid of some of this extra edema to   change his PPN to TPN, to ask Dr. Peña to revisit the patient given his nausea,   vomiting and apparent recurrent ileus, we will follow with you.      BERNARD/IN  dd: 05/06/2018 07:29:57 (CDT)  td: 05/06/2018 13:55:49 (CDT)  Doc ID   #[id number]  Job ID #[id number]    CC:

## 2018-05-06 NOTE — PROGRESS NOTES
Pt had 50cc u/o this AM after receiving albumin and lasix. Dr. Atwood called to inform me that pt's bladder was noted to be distended on CT. I spoke w/ pt who stated he felt like he needed to pee. Gonzales catheter was irrigated and 650cc of u/o noted thereafter. Dr. Atwood notified of this.    Shubham Maxwell RN

## 2018-05-06 NOTE — CONSULTS
Yamil Santiago Jr. [id number] is a 89 y.o. male who has been consulted for vancomycin dosing.    The patient has the following labs:     Date Creatinine (mg/dl)    BUN WBC Count   5/5/2018 Estimated Creatinine Clearance: 78.5 mL/min (based on SCr of 0.7 mg/dL). Lab Results   Component Value Date    BUN 15 05/05/2018     Lab Results   Component Value Date    WBC 12.90 (H) 05/05/2018        Current weight is 84.7 kg (186 lb 11.7 oz)    Pneumonia        The patient will be started on vancomycin at a dose of 1250 mg every 12 hours (15mg/kg/dose).  The vancomycin trough has been ordered for 5/7 at 1130.      Patient will be followed by pharmacy for changes in renal function, toxicity, and efficacy.   Thank you for allowing us to participate in this patient's care.     Jody Tran

## 2018-05-06 NOTE — CONSULTS
Yamil Santiago Jr. [id number] is a 89 y.o. male who has been consulted for vancomycin dosing.    The patient has the following labs:     Date Creatinine (mg/dl)    BUN WBC Count   5/6/2018 Estimated Creatinine Clearance: 91.6 mL/min (based on SCr of 0.6 mg/dL). Lab Results   Component Value Date    BUN 10 05/06/2018     Lab Results   Component Value Date    WBC 21.10 (H) 05/06/2018        Current weight is 89.6 kg (197 lb 8.5 oz)    Pt is receiving vancomycin 1250 mg every 12 hours.  Target trough range is 15-20 mg/dL.   Pharmacy will increase current doses to a vancomycin dose of 1500 mg every 12 hours due to increased renal function.  A vancomycin trough has been ordered prior to 4th dose due 05/07 at 1030.      Patient will be followed by pharmacy for changes in renal function, toxicity, and efficacy.  Thank you for allowing us to participate in this patient's care.     Timoteo CoppolaD

## 2018-05-06 NOTE — ASSESSMENT & PLAN NOTE
I do not suspect he is obstructed as he is having gas and bm, abdomen clinically benign.  Given rising wbc will check ct abd and check stool for C. Diff.

## 2018-05-06 NOTE — PLAN OF CARE
Problem: Patient Care Overview  Goal: Plan of Care Review  Outcome: Ongoing (interventions implemented as appropriate)  Plan of care reviewed with patient he verbalized understanding alert and oriented x 4 pod 6 with Dr Peña for SBO. Pt has 4 lap sites all CDI. Pt had a large BM soft. Denies any nausea Bowel sounds present in all quadrants. Pt had a irregular heart rhythm earlier in shift MD notified cardiac monitor in place. Has indwelling grubbs draining dark yellow urine. Cont on TPN and IF ABT with no ADR noted. Cont pulse oxygen sats ordered. Pt also had trouble catching his breath earlier in shift new orders initiated p0t is tolerating well. Call light within reach will cont to monitor.

## 2018-05-06 NOTE — PROGRESS NOTES
Progress Note    Admit Date: 4/26/2018   LOS: 9 days     SUBJECTIVE:     Interval history: Pt transferred to ICU on last night for respiratory failure. Denies CP at this time and breathing has improved per pt.     Scheduled Meds:   albuterol sulfate        ciprofloxacin (CIPRO)400mg/200ml D5W IVPB  400 mg Intravenous Q12H    docusate sodium  100 mg Oral BID    enoxaparin  40 mg Subcutaneous Daily    metoclopramide HCl  10 mg Intravenous Q6H    pantoprazole  40 mg Intravenous BID    piperacillin-tazobactam (ZOSYN) IVPB  3.375 g Intravenous Q6H    polyethylene glycol  17 g Oral BID    sodium chloride 0.9%        tamsulosin  0.4 mg Oral Daily    vancomycin (VANCOCIN) IVPB (custom)  15 mg/kg Intravenous Q12H     Continuous Infusions:   amino acid 2.75% - dextrose 5% (CLINIMIX-E) solution with additives (1L provides 27.5 gm AA, 50 gm CHO (170 kcal/L dextrose), Na 35, K 30, Mg 5, Ca 4.5, Acetate 51, Cl 39, Phos 15) 100 mL/hr at 05/05/18 2107     PRN Meds:albuterol, aluminum-magnesium hydroxide-simethicone, bisacodyl, HYDROmorphone, ondansetron, pneumoc 13-lopez conj-dip cr(PF), ramelteon, sodium chloride 0.9%    Review of patient's allergies indicates:  No Known Allergies    Review of Systems  Positive for SOB overnight. Negative for CP.    OBJECTIVE:     Vital Signs (Most Recent)  Temp: 97.5 °F (36.4 °C) (05/06/18 0745)  Pulse: 97 (05/06/18 0945)  Resp: (!) 27 (05/06/18 0945)  BP: (!) 159/74 (05/06/18 0945)  SpO2: 98 % (05/06/18 0945)    Vital Signs Range (Last 24H):  Temp:  [96.9 °F (36.1 °C)-98.1 °F (36.7 °C)]   Pulse:  [60-97]   Resp:  [18-35]   BP: (107-167)/(57-87)   SpO2:  [87 %-100 %]     I & O (Last 24H):  Intake/Output Summary (Last 24 hours) at 05/06/18 0958  Last data filed at 05/06/18 0812   Gross per 24 hour   Intake              425 ml   Output             2495 ml   Net            -2070 ml     Physical Exam:  General appearance: well developed, appears stated age  Lungs:  clear to auscultation  bilaterally and normal respiratory effort  Chest wall: no tenderness  Heart: regular rate and rhythm, S1, S2 normal, no murmur, click, rub or gallop  Abdomen: soft, non-tender non-distented; bowel sounds hypoactive; no masses,  no organomegaly. Abdominal dressings C/D/I.  Extremities: no cyanosis, clubbing. 2+ edema.   Pulses: 2+ and symmetric      Laboratory:  CBC:   Recent Labs  Lab 05/06/18  0323   WBC 21.10*   RBC 3.52*   HGB 11.0*   HCT 32.8*      MCV 93   MCH 31.4*   MCHC 33.6     BMP:   Recent Labs  Lab 05/06/18  0323   *   *   K 4.4      CO2 21*   BUN 10   CREATININE 0.6   CALCIUM 7.8*   MG 1.8     CMP:   Recent Labs  Lab 05/06/18  0323   *   CALCIUM 7.8*   ALBUMIN 1.9*   PROT 4.9*   *   K 4.4   CO2 21*      BUN 10   CREATININE 0.6   ALKPHOS 61   ALT 22   AST 32   BILITOT 1.0       ASSESSMENT/PLAN:                     Aspiration PNA          Transferred to ICU          Now on Vanc/Zosyn          Pulm following.           Bipap           CTA chest pending.     Small Bowel Obstruction s/p exploratory laparotomy, adhesiolysis with extensive adhesions with release of SBO.  Post-op Ileus  GI feels no obstruction at this point. F/u CT abd.  C.diff to be collected.    On IV Clinimix.    Needs aggressive incentive spirometry.  Pain control with PO narcotics and antiemetics as needed.      Yes      Malnutrition of moderate degree [E44.0]  Diet supplementation to be given once PO intake starts. Will encourage PO and monitor closely for weight changes.      Yes    Hepatitis C [B19.20]  Trend LFTs.     UTI  Urine C/S - NTD.   On cipro  UCx NG      Yes    CAD (coronary artery disease) [I25.10]  Patient with known CAD and monitor for S/Sx of angina/ACS. Continue to monitor on telemetry.      Yes    History of hypertension [Z86.79]  Continue chronic medications and monitor for any changes, adjusting as needed.      Not Applicable    Paraplegia [G82.20]  Supportive  care.  Fall precautions.  Skin care.   Yes          DVT prophylaxis: Lovenox 40 mg SQ q day.     Portions of this note were created using Dragon voice recognition software. There may be voice recognition errors found in the text, and attempts were made to correct these errors prior to signature    Collin Palomo MD    Family Medicine  5/6/2018

## 2018-05-06 NOTE — PROGRESS NOTES
ABG results called to Dr. Fletcher, new vent settings received. MD stated to get an ABG 30 mins after changes are made, RT notified of this.    Results for NANCY LANGSTON JR. (MRN [id number]) as of 5/6/2018 18:46   Ref. Range 5/6/2018 18:19   POC PH Latest Ref Range: 7.35 - 7.45  7.306 (L)   POC PCO2 Latest Ref Range: 35 - 45 mmHg 55.4 (H)   POC PO2 Latest Ref Range: 80 - 100 mmHg 79 (L)   POC BE Latest Ref Range: -2 to 2 mmol/L 1   POC HCO3 Latest Ref Range: 24 - 28 mmol/L 27.7   POC SATURATED O2 Latest Ref Range: 95 - 100 % 94 (L)   POC TCO2 Latest Ref Range: 23 - 27 mmol/L 29 (H)   FiO2 Unknown 1.00   Vt Unknown 600   PiP Unknown 32   PEEP Unknown 10   Sample Unknown ARTERIAL   DelSys Unknown Adult Vent   Allens Test Unknown Pass   Site Unknown Robert/UAC   Mode Unknown AC/PRVC   Rate Unknown 16

## 2018-05-06 NOTE — ED PROVIDER NOTES
Yamil Santiago Jr. is a 89 y.o. male patient.    Temp: 96.9 °F (36.1 °C) (05/06/18 0400)  Pulse: 68 (05/06/18 0400)  Resp: (!) 32 (05/06/18 0400)  BP: (!) 156/75 (05/06/18 0330)  SpO2: 99 % (05/06/18 0400)  Weight: 89.6 kg (197 lb 8.5 oz) (05/05/18 2345)  Height: 6' (182.9 cm) (05/02/18 1206)    <PICC>    Temporary Central Line Placement Procedure Note  Nephrology Access Service      Physician: Jared Tejada    Diagnosis: SBO    Procedure: Temporary Central Line Placement  Date: 05/06/2017  Type of Device:  Quad lumen catheter  Location:  [facility]    Indications: Vascular access    Anesthesia: Local  Full Drape Used: Yes    Procedure:   Ultrasound was used for guidance and a finder needle was used to locate the central vein. An 18-gauge hollow needle was used to access the vessel. The over the wire, the vessel was dilated and the catheter was passed into the vessel via the Seldinger technique. The catheter was sutured in place.    Confirmation:  [facility] X-ray: Placement satisfactory with PTX    Complications/Comments:  None    Estimated Blood Loss: 5 cc           Jared Tejada MD  05/06/18 0657

## 2018-05-06 NOTE — PROGRESS NOTES
ABG results below called to Dr. Chance MD states to intubate pt. RT and ER MD notified of need for intubation.    Results for NANCY LANGSTON JR. (MRN [id number]) as of 5/6/2018 18:02   Ref. Range 5/6/2018 17:22   POC PH Latest Ref Range: 7.35 - 7.45  7.276 (LL)   POC PCO2 Latest Ref Range: 35 - 45 mmHg 59.3 (HH)   POC PO2 Latest Ref Range: 80 - 100 mmHg 60 (L)   POC BE Latest Ref Range: -2 to 2 mmol/L 1   POC HCO3 Latest Ref Range: 24 - 28 mmol/L 27.6   POC SATURATED O2 Latest Ref Range: 95 - 100 % 86 (L)   POC TCO2 Latest Ref Range: 23 - 27 mmol/L 29 (H)   FiO2 Unknown 1.00   Sample Unknown ARTERIAL   DelSys Unknown CPAP/BiPAP   Allens Test Unknown Pass   Site Unknown Robert/UAC   Mode Unknown BiPAP   Rate Unknown 10

## 2018-05-06 NOTE — PROVIDER PROGRESS NOTES - EMERGENCY DEPT.
Encounter Date: 4/26/2018    ED Physician Progress Notes        Physician Note:   May 6, 2018   1745   89-year-old who presented several days ago for nausea and vomiting. Called to the patient's bedside by ICU staff for worsening respiratory status despite being on BiPAP.  Hospitalist is requesting intubation.  The patient was preoxygenated with 100% O2 by BiPAP, and given RSI drugs (etomidate and succinylcholine).  He was then intubated on the 1st attempt with a 4.0 mac blade and an 8.0 ET tube.  The cords were visualized during intubation with the tube passing through the cords.  The patient had good color change on a CO2 detector and equally course breath sounds noted on examination with no breath sounds heard over the epigastrium after intubation.  The  ET tube was placed at 24 cm at the lips.  A chest x-ray done post intubation showed the tube to be in good position per my independent interpretation.

## 2018-05-06 NOTE — PROCEDURES
Results for NANCY LANGSTON JR. (MRN [id number]) as of 5/6/2018 02:09   Ref. Range 5/6/2018 01:48   POC PH Latest Ref Range: 7.35 - 7.45  7.346 (L)   POC PCO2 Latest Ref Range: 35 - 45 mmHg 48.8 (H)   POC PO2 Latest Ref Range: 80 - 100 mmHg 107 (H)   POC BE Latest Ref Range: -2 to 2 mmol/L 1   POC HCO3 Latest Ref Range: 24 - 28 mmol/L 26.7   POC SATURATED O2 Latest Ref Range: 95 - 100 % 98   POC TCO2 Latest Ref Range: 23 - 27 mmol/L 28 (H)   FiO2 Unknown 90   Sample Unknown ARTERIAL   DelSys Unknown CPAP/BiPAP   Allens Test Unknown Pass   Site Unknown RR   Mode Unknown BiPAP       Decreased FiO2 to 80%

## 2018-05-07 PROBLEM — J96.01 ACUTE HYPOXEMIC RESPIRATORY FAILURE: Status: ACTIVE | Noted: 2018-01-01

## 2018-05-07 PROBLEM — J80 ARDS (ADULT RESPIRATORY DISTRESS SYNDROME): Status: ACTIVE | Noted: 2018-01-01

## 2018-05-07 PROBLEM — J18.9 PNEUMONIA DUE TO INFECTIOUS ORGANISM: Status: ACTIVE | Noted: 2018-01-01

## 2018-05-07 NOTE — PLAN OF CARE
Problem: Nutrition, Parenteral (Adult)  Intervention: Monitor/Manage Parenteral Nutrition Support  Recommendations    1) Parenteral: Clinimix E 5/15 @ 40 mls/hr for the first 24 hrs then progress to goal of 75 mls/hr with 20% lipids (250 mls). Hold if triglycerides >400. Provides 1778 calories, 90 gms protein, 1800 mls fluid, GIR 2.29.   2) progress to soft/bland diet per pt tolerance   3) Add Boost Plus with meals   Goals: Pt will receive at least 85% EEN within 48 hrs.   Nutrition Goal Status: continues  Communication of RD Recs:  (POC, sticky note, second sign)

## 2018-05-07 NOTE — PROGRESS NOTES
05/07/18 0752   Patient Assessment/Suction   Level of Consciousness (AVPU) responds to voice   All Lung Fields Breath Sounds clear   LLL Breath Sounds diminished   RLL Breath Sounds diminished   Suction Method endotracheal tube;in-line suction catheter (closed)   Sputum Amount small   Sputum Color red-streaked;white-yellow   Sputum Consistency thick   PRE-TX-O2-ETCO2   O2 Device (Oxygen Therapy) ventilator   $ Is the patient on Low Flow Oxygen? Yes   Oxygen Concentration (%) 50   SpO2 100 %   Pulse Oximetry Type Continuous   $ Pulse Oximetry - Multiple Charge Pulse Oximetry - Multiple   ETCO2 (mmHg) 30 mmHg   $ ETCO2 Charge Exhaled CO2 Monitoring   $ ETCO2 Usage Currently wearing   Pulse 66   Resp (!) 26   Temp 98.1 °F (36.7 °C)   Aerosol Therapy   $ Aerosol Therapy Charges PRN treatment not required   Respiratory Treatment Status PRN treatment not required       Airway - Non-Surgical 05/06/18 1759 Endotracheal Tube   Placement Date/Time: 05/06/18 1759   Present Prior to Hospital Arrival?: No  Method of Intubation: Direct laryngoscopy  Inserted by: MD  Airway Device: Endotracheal Tube  Blade: [place] #4  Airway Device Size: 8.0  Style: Cuffed  Cuff Inflation: Min...   Secured at 23 cm   Measured At Lips   Secured Location Center   Secured by Commercial tube avelar   Bite Block none   Site Condition Cool;Dry   Status Intact;Secured;Patent   Site Assessment Clean;Dry   Vent Select   Conventional Vent Y   Ventilator Initiated No   $ Ventilator Subsequent 1   Charged w/in last 24h YES   IHI Ventilator Associated Pneumonia Bundle   Head of Bed Elevated  HOB 30   Preset Conventional Ventilator Settings   Vent Type    Ventilation Type VC   Vent Mode A/C   Humidity HME   Set Rate 26 bmp   Vt Set 490 mL   PEEP/CPAP 15 cmH20   Pressure Support 0 cmH20   Waveform RAMP   Peak Flow 72 L/min   Set Inspiratory Pressure 0 cmH20   Insp Time 0 Sec(s)   Plateau Set/Insp. Hold (sec) 0   Insp Rise Time  0 %   Trigger  Sensitivity Flow/I-Trigger 2 L/min   P High 0 cm H2O   P Low 0 cm H2O   T High 0 sec   T Low 0 sec   Patient Ventilator Parameters   Resp Rate Total 26 br/min   Peak Airway Pressure 32 cmH2O   Mean Airway Pressure 21 cmH20   Plateau Pressure 32 cmH20   Exhaled Vt 506 mL   Total Ve 13.1 mL   Spont Ve 0 L   I:E Ratio Measured 1:2.10   Conventional Ventilator Alarms   Ve High Alarm 27.5 L/min   Resp Rate High Alarm 40 br/min   Press High Alarm 43 cmH2O   Apnea Rate 10   Apnea Volume (mL) 600 mL   Apnea Oxygen Concentration  100   Apnea Flow Rate (L/min) 70   T Apnea 20 sec(s)   Ready to Wean/Extubation Screen   FIO2<=50 (chart decimal) 0.5   MV<16L (chart vol.) 13.1   PEEP <=8 (chart #) (!) 15   Ready to Wean Parameters   F/VT Ratio<105 (RSBI) (!) 51.38

## 2018-05-07 NOTE — PLAN OF CARE
Problem: Patient Care Overview  Goal: Plan of Care Review  Outcome: Ongoing (interventions implemented as appropriate)  Pt remains intubated and sedated on Versed and Fentanyl.  Temp this am 97 esophageal. SB on monitor. [place] positional. Safety measures in place. Rotation bed on.

## 2018-05-07 NOTE — PROGRESS NOTES
Progress Note  PULMONARY    Admit Date: 4/26/2018 05/07/2018      SUBJECTIVE:     May 7, not arousing, sedate on vent and warming blanket.      PFSH and Allergies reviewed.    OBJECTIVE:     Vitals (Most recent):  Vitals:    05/07/18 1210   BP:    Pulse: 62   Resp: (!) 26   Temp: 97.5 °F (36.4 °C)       Vitals (24 hour range):  Temp:  [94.1 °F (34.5 °C)-98.1 °F (36.7 °C)]   Pulse:  [44-79]   Resp:  [2-40]   BP: ()/(46-77)   SpO2:  [86 %-100 %]   Arterial Line BP: ()/()       Intake/Output Summary (Last 24 hours) at 05/07/18 1254  Last data filed at 05/07/18 1210   Gross per 24 hour   Intake           961.71 ml   Output              648 ml   Net           313.71 ml          Physical Exam:  The patient's neuro status (alertness,orientation,cognitive function,motor skills,), pharyngeal exam (oral lesions, hygiene, abn dentition,), Neck (jvd,mass,thyroid,nodes in neck and above/below clavicle),RESPIRATORY(symmetry,effort,fremitus,percussion,auscultation),  Cor(rhythm,heart tones including gallops,perfusion,edema)ABD(distention,hepatic&splenomegaly,tenderness,masses), Skin(rash,cyanosis),Psyc(affect,judgement,).  Exam negative except for these pertinent findings:    Intubated, no distress, chest is symmetric, no distress, normal percussion, normal fremitus and good normal breath sounds - bronchial pattern noted anterior, no edema, not arousing.      Radiographs reviewed: view by direct vision  C/w ards  Results for orders placed during the hospital encounter of 04/26/18   X-Ray Chest 1 View    Narrative EXAMINATION:  XR CHEST 1 VIEW    CLINICAL HISTORY:  et tube placement;    TECHNIQUE:  Single, AP chest radiograph.    COMPARISON:  05/06/2018, 6:48 a.m.    FINDINGS:  ET tube tip projects over the mid trachea.  Right IJ CVL tip projects over the upper SVC.  An enteric tube courses into the abdomen, its tip not included on the study.  Transverse cardiac diameter appears within normal limits.  Severe  mixed alveolar and interstitial opacities are again noted in each lung.  Small pleural effusions are noted.  No radiographically apparent pneumothorax.  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      Impression The ET tube tip projects over the mid trachea.    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    The preliminary and final reports are concordant.      Electronically signed by: Adelita Ortega MD  Date:    05/07/2018  Time:    07:25   ]    Labs       Recent Labs  Lab 05/07/18  0339   WBC 23.80*   HGB 10.1*   HCT 31.3*          Recent Labs  Lab 05/07/18  0339   *   K 4.1   CL 96   CO2 25   BUN 15   CREATININE 0.7   *   CALCIUM 8.2*   MG 1.8   PHOS 2.4*   AST 27   ALT 18   ALKPHOS 49*   BILITOT 0.8   PROT 4.7*   ALBUMIN 2.2*       Recent Labs  Lab 05/07/18  0638   PH 7.407   PCO2 45.6*   PO2 99   HCO3 28.7*     Microbiology Results (last 7 days)     Procedure Component Value Units Date/Time    Blood culture [[id number]] Collected:  05/06/18 1440    Order Status:  Completed Specimen:  Blood from Line, Arterial Updated:  05/07/18 0715     Blood Culture, Routine No Growth to date    Blood culture [[id number]] Collected:  05/06/18 1440    Order Status:  Completed Specimen:  Blood from Antecubital, Left Updated:  05/07/18 0715     Blood Culture, Routine No Growth to date    Culture, Respiratory with Gram Stain [[id number]] Collected:  05/06/18 1809    Order Status:  Completed Specimen:  Respiratory from Sputum, Expectorated Updated:  05/07/18 0342     Gram Stain (Respiratory) <10 epithelial cells per low power field.     Gram Stain (Respiratory) Rare WBC's     Gram Stain (Respiratory) No organisms seen    Clostridium difficile EIA [[id number]]     Order Status:  No result Specimen:  Stool from Stool           Impression:  Active Hospital Problems    Diagnosis  POA    Acute hypoxemic respiratory failure [J96.01]   Clinically Undetermined    ARDS (adult respiratory distress syndrome) [J80]  Yes    Pneumonia due to infectious organism [J18.9]  Yes    SBO (small bowel obstruction) [K56.609]  Yes    Non-intractable vomiting with nausea [R11.2]  Yes    Malnutrition of moderate degree [E44.0]  Yes    Hepatitis C [B19.20]  Yes    CAD (coronary artery disease) [I25.10]  Yes    History of hypertension [Z86.79]  Not Applicable    Paraplegia [G82.20]  Yes      Resolved Hospital Problems    Diagnosis Date Resolved POA   No resolved problems to display.               Plan:   May 7, still septic, and ards moderate by [place].  At 6 ft. Needs tv 466 for 6 cc/kg.  Continue support - was very debilitated prior c4 paraplegic,and hard to evaluate but expect poor recovery.  Cont abx for pneumonia etc...                                      .

## 2018-05-07 NOTE — PROGRESS NOTES
05/06/18 2019   PRE-TX-O2-ETCO2   Oxygen Concentration (%) 65   SpO2 (!) 94 %   ETCO2 (mmHg) 22 mmHg   Pulse (!) 49   Resp (!) 5   Temp (!) 94.1 °F (34.5 °C)   Vent Select   Charged w/in last 24h YES   Preset Conventional Ventilator Settings   Vent Type    Ventilation Type VC   Vent Mode A/C   Humidity HME   Set Rate 26 bmp   Vt Set 490 mL   PEEP/CPAP 15 cmH20   Pressure Support 0 cmH20   Waveform RAMP   Peak Flow 72 L/min   Set Inspiratory Pressure 0 cmH20   Insp Time 0 Sec(s)   Plateau Set/Insp. Hold (sec) 0   Insp Rise Time  0 %   Trigger Sensitivity Flow/I-Trigger 2 L/min   P High 0 cm H2O   P Low 0 cm H2O   T High 0 sec   T Low 0 sec   Patient Ventilator Parameters   Resp Rate Total 26 br/min   Peak Airway Pressure 37 cmH2O   Mean Airway Pressure 23 cmH20   Plateau Pressure 32 cmH20   Exhaled Vt 508 mL   Total Ve 13.2 mL   Spont Ve 0 L   I:E Ratio Measured 1:2.10   Conventional Ventilator Alarms   Ve High Alarm 27.5 L/min   Resp Rate High Alarm 40 br/min   Press High Alarm 43 cmH2O   Apnea Rate 10   Apnea Volume (mL) 600 mL   Apnea Oxygen Concentration  100   Apnea Flow Rate (L/min) 70   T Apnea 20 sec(s)   Ready to Wean/Extubation Screen   FIO2<=50 (chart decimal) (!) 0.65   MV<16L (chart vol.) 13.2   PEEP <=8 (chart #) (!) 15   Ready to Wean Parameters   F/VT Ratio<105 (RSBI) (!) 9.84

## 2018-05-07 NOTE — ASSESSMENT & PLAN NOTE
CT shows no evidence of obstruction or abscess.  Continue pulmonary care.    Will discuss with Dr. Sorensen.

## 2018-05-07 NOTE — CONSULTS
[facility]  Adult Nutrition  Progress Note    SUMMARY       Recommendations    1) Parenteral: Clinimix E 5/15 @ 40 mls/hr for the first 24 hrs then progress to goal of 75 mls/hr with 20% lipids (250 mls). Hold if triglycerides >400. Provides 1778 calories, 90 gms protein, 1800 mls fluid, GIR 2.29.   2) progress to soft/bland diet per pt tolerance   3) Add Boost Plus with meals   Goals: Pt will receive at least 85% EEN within 48 hrs.   Nutrition Goal Status: continues  Communication of RD Recs:  (POC, sticky note, second sign)    Reason for Assessment    Reason for Assessment: consult, TPN  1. Non-intractable vomiting with nausea, unspecified vomiting type    2. Ileus    3. SBO (small bowel obstruction)    4. Coronary artery disease, angina presence unspecified, unspecified vessel or lesion type, unspecified whether native or transplanted heart    5. Chronic hepatitis C without hepatic coma    6. Malnutrition of moderate degree      Past Medical History:   Diagnosis Date    Hepatitis C     Neck injury     Paraplegic spinal paralysis      General Information Comments: Admitted with vomiting x 2 days., constipation. Pt reports . Notes he can eat all of his clear liquids, but it takes a while. <50% estimated energy needs since 4/26/18.    5/7/18: Pt was transferred to ICU over the weekend. Clinimix E 2.75/5 continues. Now on vent. Energy needs adjusted accordingly. Wt gain noted.     Nutrition Risk Screen    Nutrition Risk Screen: dysphagia or difficulty swallowing    Nutrition/Diet History    Patient Reported Diet/Restrictions/Preferences: general  Food Preferences: nno intolerances. does not use ONS at home  Do you have any cultural, spiritual, Adventist conflicts, given your current situation?: jehova's witness: no blood products of any kind  Food Allergies: NKFA    Anthropometrics    Temp: 97.7 °F (36.5 °C)  Height Method: Stated  Height: 6'  Height (inches): 72 in  Weight Method: Bed  Scale  Weight: 89.6 kg (197 lb 8.5 oz)  Weight (lb): 197.53 lb  Ideal Body Weight (IBW), Male: 178 lb  % Ideal Body Weight, Male (lb): 104.9 lb  BMI (Calculated): 25.4  Usual Body Weight (UBW), k.9 kg (per chart review 18)  % Usual Body Weight: 101.16  % Weight Change From Usual Weight: 0.95 %       Lab/Procedures/Meds    Pertinent Labs Reviewed: reviewed  Lab Results   Component Value Date    ALBUMIN 2.2 (L) 2018     No results found for: CRP  Lab Results   Component Value Date    WBC 23.80 (H) 2018     Pertinent Medications Reviewed: reviewed  Scheduled Meds:   ciprofloxacin (CIPRO)400mg/200ml D5W IVPB  400 mg Intravenous Q12H    docusate sodium  100 mg Oral BID    enoxaparin  40 mg Subcutaneous Daily    metoclopramide HCl  10 mg Intravenous Q6H    pantoprazole  40 mg Intravenous BID    piperacillin-tazobactam (ZOSYN) IVPB  3.375 g Intravenous Q6H    polyethylene glycol  17 g Oral BID    tamsulosin  0.4 mg Oral Daily    vancomycin (VANCOCIN) IVPB (custom)  1,500 mg Intravenous Q12H     Continuous Infusions:   amino acid 2.75% - dextrose 5% (CLINIMIX-E) solution with additives (1L provides 27.5 gm AA, 50 gm CHO (170 kcal/L dextrose), Na 35, K 30, Mg 5, Ca 4.5, Acetate 51, Cl 39, Phos 15) 100 mL/hr at 18 2251    fentanyl 50 mcg/hr (18 0049)    midazolam (VERSED) infusion (titrating) 2 mg/hr (18 0312)    norepinephrine bitartrate-D5W         Physical Findings/Assessment    Overall Physical Appearance: loss of subcutaneous fat, loss of muscle mass  Oral/Mouth Cavity: WDL  Skin:  (Ammon score 12)   Edema 3+     Estimated/Assessed Needs    Weight Used For Calorie Calculations: 81.6 kg (179 lb 14.3 oz)     Energy Need Method: [place]: 1788 adjusted 2/2 vent   Kcal/kg  25-30 kcal/[id number]     Weight Used For Protein Calculations: 80.7 kg (178 lb)   1.2-1.5 gm/kg/day:      Fluid Need Method: RDA Method     CHO Requirement: n/a      Nutrition Prescription  Ordered    Current Diet Order: NPO    Evaluation of Received Nutrient/Fluid Intake    Parenteral Calories: Clinimix E 2.75/5@ 100 mls/hr. No lipids. Provides 672 calories, 66 gms protein, 2400 mls fluid, GIR: 1.02    Energy Calories Required: not meeting needs (38%)  Protein Required: not meeting needs (67%)  Fluid Required: per primary team    Intake/Output Summary (Last 24 hours) at 05/07/18 1108  Last data filed at 05/07/18 1000   Gross per 24 hour   Intake           961.71 ml   Output             1468 ml   Net          -506.29 ml     Tolerance: tolerating  % Intake of Estimated Energy Needs: 25 - 50 % % Meal Intake: 0-25 %    Nutrition Risk    Level of Risk/Frequency of Follow-up:  (2 x wkly)     Assessment and Plan    Malnutrition of moderate degree    Contributing Nutrition Diagnosis  Inadequate energy intake    Related to (etiology):   Acuity of illness    Signs and Symptoms (as evidenced by):   1) <50% estimated energy needs >5 days  2) Moderate muscle loss noted around clavicle  3) Moderate fat loss noted in upper arm, orbital  4) Hand , weak    Interventions/Recommendations (treatment strategy):  1) progress to soft/bland diet + Boost Plus with meals 2) If unable to progress, recommend increasing PPN to 125 mls/hr and add lipids VS TPN    Nutrition Diagnosis Status:   continues               Monitor and Evaluation    Food and Nutrient Intake: energy intake  Food and Nutrient Adminstration: diet order  Anthropometric Measurements: weight, weight change  Biochemical Data, Medical Tests and Procedures: inflammatory profile  Nutrition-Focused Physical Findings: overall appearance, skin     Discharge Plan    Soft/bland with ONS for prn use  RD Follow-up?: Yes

## 2018-05-07 NOTE — CONSULTS
Date: 5/7/2018   Yamil Santiago Jr. [id number] is a 89 y.o. male who has been consulted for vancomycin dosing.    The patient has the following labs:     Creatinine (mg/dl)    WBC Count   Serum creatinine: 0.7 mg/dL 05/07/18 0339  Estimated creatinine clearance: 78.5 mL/min Lab Results   Component Value Date    WBC 23.80 (H) 05/07/2018        Current weight is 89.6 kg (197 lb 8.5 oz)      Pt is receiving vancomycin 1500 mg every 12 hours.  Vancomycin trough from 5/7/2018 at 1051 was 17.1 mg/dL.  Target goal is 15-20 mg/dL.    Trough was drawn prior to the 3rd dose and anticipate further accumulation in this 88 yo patient, thus will adjust regimen to 1750mg q18h at this time. The vancomycin trough has been ordered for 5/9 at 1730.  Patient will be followed by pharmacy for changes in renal function, toxicity, and efficacy.    Thank you for allowing us to participate in this patient's care.     Marie Nolasco, TimoteoD

## 2018-05-07 NOTE — PROGRESS NOTES
Teressa called chest xray results, to myself. Dr. Fletcher is already aware as she has independently reviewed the chest xray herself.    Shubham Maxwell RN

## 2018-05-07 NOTE — PLAN OF CARE
Problem: Patient Care Overview  Goal: Plan of Care Review  Outcome: Ongoing (interventions implemented as appropriate)  Remains intubated and sedated. Opens eyes to voice and nods yes/no to questions. No c/o pain. Clinimix orders adjusted to meet caloric needs. Laparoscopic incisions with steri strips intact. Abdomen firm, no BM today. Bilateral soft wrist restraints removed safely this am, no injury noted. Safety maintained.

## 2018-05-07 NOTE — PROCEDURES
Results for NANCY LANGSTON JR. (MRN [id number]) as of 5/6/2018 20:14   Ref. Range 5/6/2018 19:24 5/6/2018 20:02   POC PH Latest Ref Range: 7.35 - 7.45  7.358 7.381   POC PCO2 Latest Ref Range: 35 - 45 mmHg 53.4 (H) 49.9 (H)   POC PO2 Latest Ref Range: 80 - 100 mmHg 95 171 (H)   POC BE Latest Ref Range: -2 to 2 mmol/L 5 4   POC HCO3 Latest Ref Range: 24 - 28 mmol/L 30.0 (H) 29.5 (H)   POC SATURATED O2 Latest Ref Range: 95 - 100 % 97 99   POC TCO2 Latest Ref Range: 23 - 27 mmol/L 32 (H) 31 (H)   FiO2 Unknown 90 70   Vt Unknown 490 490   PiP Unknown 33 30   PEEP Unknown 12 15   Sample Unknown ARTERIAL ARTERIAL   DelSys Unknown Adult Vent Adult Vent   Allens Test Unknown N/A N/A   Site Unknown Robert/UAC Robert/UAC   Mode Unknown AC/PRVC AC/PRVC   Rate Unknown 26 26       Reported to Dr. Fletcher

## 2018-05-07 NOTE — PROGRESS NOTES
Progress Note  Hospital Medicine  Patient Name:Yamil Santiago Jr.  MRN:  [id number]  Patient Class: IP- Inpatient  Admit Date: 4/26/2018  Length of Stay: 10 days  Expected Discharge Date:   Attending Physician: Kimberyl Sorensen MD  Primary Care Provider:  Tima Schuster MD    SUBJECTIVE:     Principal Problem: Vomiting for 2 days and dysuria  Initial history of present illness: Patient is a 89 y.o. male admitted to Hospitalist Service from [facility] Emergency Room with complaint of vomiting for 2 days and dysuria. 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 Part of the history obtained from patient's wife. Patient has been taking PO Keflex for UTI diagnosed 4 days ago.  presents to the ED with vomiting, constipation,frequent urination, decreased urination, decreased appetite with an onset x 1 day PTA. The patient reports that he was recently discharged from the hospital x 5 days ago for a UTI. Patient reports that he has been taking his antibiotics (500mg Keflex)  as directed, but believes they are making him feel badly. He states that he began vomiting yesterday. He denies nausea at this time .  He states that the vomit was clear. Patient states that he has not eaten in two days. He claims that he has not had a bowel movement in 2-3 days. Patient denies cough, swelling,hematuria, hematemesis, bright red blood in stool, or any other symptoms at this time. Family reports that they are concerned that the patient may have diabetes. They report that the patient has not seen his PCP since August 2017. No bed sores reported. Patient denied chest pain, shortness of breath, headache, vision changes, focal neuro-deficits, cough or fever.    PMH/PSH/SH/FH/Meds: reviewed.    Symptoms/Review of Systems: Over the weekend, patient transferred to ICU for respiratory failure and aspiration pneumonia. s/p  exploratory laparotomy, adhesiolysis with extensive adhesions with release of SBO. Patient is sedated and on ventilator.   Diet:  TPN + Lipids. NPO  Activity level: Quadriplegic  Pain:  Patient reports no pain.       OBJECTIVE:   Vital Signs (Most Recent):      Temp: 98.1 °F (36.7 °C) (05/07/18 0752)  Pulse: 67 (05/07/18 0814)  Resp: (!) 26 (05/07/18 0814)  BP: (!) 97/56 (05/07/18 0800)  SpO2: 95 % (05/07/18 0814)       Vital Signs Range (Last 24H):  Temp:  [94.1 °F (34.5 °C)-98.1 °F (36.7 °C)]   Pulse:  [44-97]   Resp:  [2-40]   BP: ()/(46-78)   SpO2:  [86 %-100 %]   Arterial Line BP: ()/()     Weight: 89.6 kg (197 lb 8.5 oz)  Body mass index is 26.79 kg/m².    Intake/Output Summary (Last 24 hours) at 05/07/18 0843  Last data filed at 05/07/18 0814   Gross per 24 hour   Intake          1326.71 ml   Output             2093 ml   Net          -766.29 ml     Physical Examination:  General appearance: well developed, appears stated age, on ventilator  Head: normocephalic, atraumatic  Eyes:  conjunctivae/corneas clear. PERRL.  Nose: Nares normal. Septum midline.  Throat: lips, mucosa, and tongue normal; teeth and gums normal, no throat erythema.  Neck: supple, symmetrical, trachea midline, no JVD and thyroid not enlarged, symmetric, no tenderness/mass/nodules  Lungs:  clear to auscultation bilaterally and normal respiratory effort  Chest wall: no tenderness  Heart: regular rate and rhythm, S1, S2 normal, no murmur, click, rub or gallop  Abdomen: soft, non-tender non-distented; bowel sounds hypoactive; no masses,  no organomegaly. Abdominal dressings C/D/I.  Extremities: no cyanosis, clubbing. 2+ edema.   Pulses: 2+ and symmetric  Skin: Skin color, texture, turgor normal. No rashes or lesions.  Lymph nodes: Cervical, supraclavicular, and axillary nodes normal.  Neurologic: sedated    CBC:    Recent Labs  Lab 05/05/18  2308 05/06/18  0323 05/07/18  0339   WBC 17.40* 21.10* 23.80*   RBC 3.70* 3.52* 3.29*    HGB 11.5* 11.0* 10.1*   HCT 35.1* 32.8* 31.3*    175 173   MCV 95 93 95   MCH 31.1* 31.4* 30.9   MCHC 32.7 33.6 32.4   BMP    Recent Labs  Lab 05/05/18  0501 05/05/18  2308 05/06/18  0323 05/07/18  0339   * 121* 119* 122*   * 127* 127* 127*   K 4.4 4.3 4.4 4.1    99 101 96   CO2 25 24 21* 25   BUN 15 11 10 15   CREATININE 0.7 0.6 0.6 0.7   CALCIUM 7.9* 7.9* 7.8* 8.2*   MG 1.7  --  1.8 1.8      Diagnostic Results:  Microbiology Results (last 7 days)     Procedure Component Value Units Date/Time    Blood culture [[id number]] Collected:  05/06/18 1440    Order Status:  Completed Specimen:  Blood from Line, Arterial Updated:  05/07/18 0715     Blood Culture, Routine No Growth to date    Blood culture [[id number]] Collected:  05/06/18 1440    Order Status:  Completed Specimen:  Blood from Antecubital, Left Updated:  05/07/18 0715     Blood Culture, Routine No Growth to date    Culture, Respiratory with Gram Stain [[id number]] Collected:  05/06/18 1809    Order Status:  Completed Specimen:  Respiratory from Sputum, Expectorated Updated:  05/07/18 0342     Gram Stain (Respiratory) <10 epithelial cells per low power field.     Gram Stain (Respiratory) Rare WBC's     Gram Stain (Respiratory) No organisms seen    Clostridium difficile EIA [[id number]]     Order Status:  No result Specimen:  Stool from Stool          CT abdomen and pelvis with contrast: Probable adynamic ileus with all the small bowel loops containing fluid but without marked distention seen.  The colon contains stool but is not distended.  Severe prostate hypertrophy.  Bilateral small pleural effusions right greater than left and trace ascites around the liver.    KUB: Appropriately positioned nasogastric tube.  Improved bowel gas pattern with no distinct loops of dilated small bowel evident on this exam.    KUB: NG tube removed and interval increase in the air-filled loops of bowel suggesting ileus.    CXR: The ET tube tip projects  over the mid trachea. 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    CXR: Interval increase in alveolar opacity in the left lung consistent with atelectasis given the rapid interval change.  Overall, there has otherwise been an interval improvement in aeration of each lung noting that persistent severe pulmonary disease persists.  Differential considerations include pneumonia, pulmonary edema and ARDS.    LUE venous doppler: No evidence for left upper extremity venous thrombosis.    CT abdomen and pelvis: No evidence for intra-abdominal postoperative complication.  Lung findings compatible with multifocal pneumonia.  Small bilateral pleural effusions. Prostatomegaly, with evidence for chronic bladder outlet obstruction.  A Gonzales catheter is present within the urinary bladder however the urinary bladder remains moderately distended.  New minimal bilateral hydronephrosis is nonspecific however may be attributable to bladder outlet obstruction. Anasarca.    CXR: Appropriate position of support devices. Diffuse bilateral interstitial and airspace infiltrates may represent pneumonia, edema, or ARDS.  No significant change.    CTA chest: No evidence for pulmonary thromboembolic disease. Multifocal pneumonia. Moderate bilateral pleural effusions with compressive lower lobe atelectasis. 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    Assessment/Plan:      Aspiration Pneumonia - multifocal pneumonia  Acute Hypoxic respiratory failure  Continue mechanical ventilator, follow pulmonary recommendations.  Continue beta 2 agonist bronchodilator treatments.   Continue IV antibiotics -Vancomycin, Cipro and Zosyn.  Check sputum GS and Cx.   Continue routine medications as before.     Small Bowel Obstruction s/p exploratory laparotomy, adhesiolysis with extensive adhesions with release of SBO.  Post-op  Ileus  Continue to follow Dr. Peña's recommendations. NPO.  Needs aggressive incentive spirometry.  Follow hemoglobin and hematocrit closely.  Pain control with PO narcotics and antiemetics as needed.      Yes    Malnutrition of moderate degree [E44.0]  Diet supplementation to be given once PO intake starts. Will encourage PO and monitor closely for weight changes.      Yes    Hepatitis C [B19.20]  Trend LFTs.     UTI  Urine C/S - NTD. Continue IV Cipro.      Yes    CAD (coronary artery disease) [I25.10]  Patient with known CAD and monitor for S/Sx of angina/ACS. Continue to monitor on telemetry.      Yes    History of hypertension [Z86.79]  Continue chronic medications and monitor for any changes, adjusting as needed.      Not Applicable    Paraplegia [G82.20]  Supportive care.  Fall precautions.  Skin care.   Yes          DVT prophylaxis: Lovenox 40 mg SQ q day.    Continue PT.     Kimberly Sorensen MD  Department of Hospital Medicine   [facility]

## 2018-05-07 NOTE — PROCEDURES
Results for NANCY LANGSTON JR. (MRN [id number]) as of 5/6/2018 21:35   Ref. Range 5/6/2018 20:02   POC PH Latest Ref Range: 7.35 - 7.45  7.381   POC PCO2 Latest Ref Range: 35 - 45 mmHg 49.9 (H)   POC PO2 Latest Ref Range: 80 - 100 mmHg 171 (H)   POC BE Latest Ref Range: -2 to 2 mmol/L 4   POC HCO3 Latest Ref Range: 24 - 28 mmol/L 29.5 (H)   POC SATURATED O2 Latest Ref Range: 95 - 100 % 99   POC TCO2 Latest Ref Range: 23 - 27 mmol/L 31 (H)   FiO2 Unknown 70   Vt Unknown 490   PiP Unknown 30   PEEP Unknown 15   Sample Unknown ARTERIAL   DelSys Unknown Adult Vent   Allens Test Unknown N/A   Site Unknown Robert/UAC   Mode Unknown AC/PRVC   Rate Unknown 26

## 2018-05-07 NOTE — PROGRESS NOTES
[facility] Surgery  Progress Note    Subjective:     History of Present Illness:  Patient is a 89 y.o. male admitted to Hospitalist Service from [facility] Emergency Room with complaint of vomiting for 2 days and dysuria. 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 Part of the history obtained from patient's wife. Patient has been taking PO Keflex for UTI diagnosed 4 days ago.  presents to the ED with vomiting, constipation,frequent urination, decreased urination, decreased appetite with an onset x 1 day PTA. The patient reports that he was recently discharged from the hospital x 5 days ago for a UTI. Patient reports that he has been taking his antibiotics (500mg Keflex)  as directed, but believes they are making him feel badly. He states that he began vomiting yesterday. He denies nausea at this time .  He states that the vomit was clear. Patient states that he has not eaten in two days. He claims that he has not had a bowel movement in 2-3 days. Patient denies cough, swelling,hematuria, hematemesis, bright red blood in stool, or any other symptoms at this time.  Family reports that they are concerned that the patient may have diabetes. They report that the patient has not seen his PCP since August 2017. No bed sores reported. Patient denied chest pain, shortness of breath, headache, vision changes, focal neuro-deficits, cough or fever.    Post-Op Info:  Procedure(s) (LRB):  EXPLORATORY-LAPAROTOMY (N/A)   8 Days Post-Op     Interval History: Events of weekend noted.  Intubated, sedated.  Abdomen benign.    Medications:  Continuous Infusions:   amino acid 2.75% - dextrose 5% (CLINIMIX-E) solution with additives (1L provides 27.5 gm AA, 50 gm CHO (170 kcal/L dextrose), Na 35, K 30, Mg 5, Ca 4.5, Acetate 51, Cl 39, Phos 15) 100 mL/hr at 05/06/18 2251    fentanyl  50 mcg/hr (05/07/18 0049)    midazolam (VERSED) infusion (titrating) 2 mg/hr (05/07/18 0312)    norepinephrine bitartrate-D5W       Scheduled Meds:   ciprofloxacin (CIPRO)400mg/200ml D5W IVPB  400 mg Intravenous Q12H    docusate sodium  100 mg Oral BID    enoxaparin  40 mg Subcutaneous Daily    metoclopramide HCl  10 mg Intravenous Q6H    pantoprazole  40 mg Intravenous BID    piperacillin-tazobactam (ZOSYN) IVPB  3.375 g Intravenous Q6H    polyethylene glycol  17 g Oral BID    tamsulosin  0.4 mg Oral Daily    vancomycin (VANCOCIN) IVPB (custom)  1,500 mg Intravenous Q12H     PRN Meds:albuterol, aluminum-magnesium hydroxide-simethicone, bisacodyl, HYDROmorphone, ondansetron, pneumoc 13-lopez conj-dip cr(PF), ramelteon, sodium chloride 0.9%     Review of patient's allergies indicates:  No Known Allergies  Objective:     Vital Signs (Most Recent):  Temp: 98.1 °F (36.7 °C) (05/07/18 0752)  Pulse: 67 (05/07/18 0814)  Resp: (!) 26 (05/07/18 0814)  BP: (!) 97/56 (05/07/18 0800)  SpO2: 95 % (05/07/18 0814) Vital Signs (24h Range):  Temp:  [94.1 °F (34.5 °C)-98.1 °F (36.7 °C)] 98.1 °F (36.7 °C)  Pulse:  [44-97] 67  Resp:  [2-40] 26  SpO2:  [86 %-100 %] 95 %  BP: ()/(46-78) 97/56  Arterial Line BP: ()/() 102/48     Weight: 89.6 kg (197 lb 8.5 oz)  Body mass index is 26.79 kg/m².    Intake/Output - Last 3 Shifts       05/05 0700 - 05/06 0659 05/06 0700 - 05/07 0659 05/07 0700 - 05/08 0659    P.O. 425 120     I.V. (mL/kg)  256.7 (2.9)     IV Piggyback  1150     Total Intake(mL/kg) 425 (4.7) 1526.7 (17)     Urine (mL/kg/hr) 2455 (1.1) 2083 (1) 50 (0.2)    Total Output 2455 2083 50    Net -2030 -556.3 -50           Urine Occurrence  1 x     Stool Occurrence 3 x 0 x 0 x          Physical Exam   Constitutional: He is oriented to person, place, and time. He appears well-developed and well-nourished. He is sedated and intubated.   HENT:   Head: Normocephalic and atraumatic.   Right Ear: External ear  normal.   Left Ear: External ear normal.   Eyes: Conjunctivae are normal. Pupils are equal, round, and reactive to light.   Neck: Normal range of motion.   Cardiovascular: Normal rate and regular rhythm.    Pulmonary/Chest: Effort normal. He is intubated. No respiratory distress. He exhibits no tenderness.   Abdominal: Soft. He exhibits no distension and no mass.   Musculoskeletal: He exhibits no edema or tenderness.   Neurological: He is oriented to person, place, and time. He has normal reflexes. No cranial nerve deficit.   Skin: Skin is warm and dry. No rash noted. No erythema. No pallor.   Psychiatric: He has a normal mood and affect. His behavior is normal. Judgment and thought content normal.   Nursing note and vitals reviewed.      Significant Labs:  CBC:   Recent Labs  Lab 05/07/18  0339   WBC 23.80*   RBC 3.29*   HGB 10.1*   HCT 31.3*      MCV 95   MCH 30.9   MCHC 32.4     CMP:   Recent Labs  Lab 05/07/18  0339   *   CALCIUM 8.2*   ALBUMIN 2.2*   PROT 4.7*   *   K 4.1   CO2 25   CL 96   BUN 15   CREATININE 0.7   ALKPHOS 49*   ALT 18   AST 27   BILITOT 0.8     ABGs:   Recent Labs  Lab 05/07/18  0638   PH 7.407   PCO2 45.6*   PO2 99   HCO3 28.7*   POCSATURATED 98   BE 4       Significant Diagnostics:  I have reviewed all pertinent imaging results/findings within the past 24 hours.    Assessment/Plan:     SBO (small bowel obstruction)    CT shows no evidence of obstruction or abscess.  Continue pulmonary care.    Will discuss with Dr. Sorensen.          Non-intractable vomiting with nausea    Appears to have resolved            Morales Peña MD  General Surgery  [facility]

## 2018-05-07 NOTE — PROGRESS NOTES
05/06/18 2000   PRE-TX-O2-ETCO2   Oxygen Concentration (%) 70   SpO2 99 %   ETCO2 (mmHg) 24 mmHg   Pulse (!) 44   Resp (!) 26   BP (!) 104/55   Vent Select   Charged w/in last 24h YES   Preset Conventional Ventilator Settings   Vent Type    Ventilation Type VC   Vent Mode A/C   Humidity HME   Set Rate 26 bmp   Vt Set 490 mL   PEEP/CPAP 15 cmH20   Pressure Support 0 cmH20   Waveform RAMP   Peak Flow 72 L/min   Set Inspiratory Pressure 0 cmH20   Insp Time 0 Sec(s)   Plateau Set/Insp. Hold (sec) 0   Insp Rise Time  0 %   Trigger Sensitivity Flow/I-Trigger 2 L/min   P High 0 cm H2O   P Low 0 cm H2O   T High 0 sec   T Low 0 sec   Patient Ventilator Parameters   Resp Rate Total 26 br/min   Peak Airway Pressure 35 cmH2O   Mean Airway Pressure 22 cmH20   Plateau Pressure 0 cmH20   Exhaled Vt 486 mL   Total Ve 13 mL   Spont Ve 0 L   I:E Ratio Measured 1:2.10   Conventional Ventilator Alarms   Ve High Alarm 27.5 L/min   Resp Rate High Alarm 40 br/min   Press High Alarm 43 cmH2O   Apnea Rate 10   Apnea Volume (mL) 600 mL   Apnea Oxygen Concentration  100   Apnea Flow Rate (L/min) 70   T Apnea 20 sec(s)   Ready to Wean/Extubation Screen   FIO2<=50 (chart decimal) (!) 0.7   MV<16L (chart vol.) 13   PEEP <=8 (chart #) (!) 15   Ready to Wean Parameters   F/VT Ratio<105 (RSBI) (!) 53.5

## 2018-05-07 NOTE — PT/OT/SLP DISCHARGE
Physical Therapy Discharge Summary    Name: Yamil Santiago Jr.  MRN: [id number]   Principal Problem: <principal problem not specified>     Patient Discharged from acute Physical Therapy on 2018  Please refer to prior PT noted date on 2018 for functional status.     Assessment:     Patient transferred to lower level of care secondary to resp distress in ICU on ventilator    Objective:     GOALS:    Physical Therapy Goals        Problem: Physical Therapy Goal    Goal Priority Disciplines Outcome Goal Variances Interventions   Physical Therapy Goal     PT/OT, PT Ongoing (interventions implemented as appropriate)     Description:  Goals to be met by: 2018     Patient will increase functional independence with mobility by performin. Supine to sit with Maximum Assistance  2. Bed to chair transfer with Maximum Assistance using anterior/posterior scooting or lateral technique  3. Sitting at edge of bed x20 minutes with Maximum Assistance  4. Lower extremity exercise program x20 reps passively/stretching                      Reasons for Discontinuation of Therapy Services  pt transferred to ICU      Plan:     Patient Discharged to: ICU.    Rafaela Pickens, PT  2018

## 2018-05-07 NOTE — SUBJECTIVE & OBJECTIVE
Interval History: Events of weekend noted.  Intubated, sedated.  Abdomen benign.    Medications:  Continuous Infusions:   amino acid 2.75% - dextrose 5% (CLINIMIX-E) solution with additives (1L provides 27.5 gm AA, 50 gm CHO (170 kcal/L dextrose), Na 35, K 30, Mg 5, Ca 4.5, Acetate 51, Cl 39, Phos 15) 100 mL/hr at 05/06/18 2251    fentanyl 50 mcg/hr (05/07/18 0049)    midazolam (VERSED) infusion (titrating) 2 mg/hr (05/07/18 0312)    norepinephrine bitartrate-D5W       Scheduled Meds:   ciprofloxacin (CIPRO)400mg/200ml D5W IVPB  400 mg Intravenous Q12H    docusate sodium  100 mg Oral BID    enoxaparin  40 mg Subcutaneous Daily    metoclopramide HCl  10 mg Intravenous Q6H    pantoprazole  40 mg Intravenous BID    piperacillin-tazobactam (ZOSYN) IVPB  3.375 g Intravenous Q6H    polyethylene glycol  17 g Oral BID    tamsulosin  0.4 mg Oral Daily    vancomycin (VANCOCIN) IVPB (custom)  1,500 mg Intravenous Q12H     PRN Meds:albuterol, aluminum-magnesium hydroxide-simethicone, bisacodyl, HYDROmorphone, ondansetron, pneumoc 13-lopez conj-dip cr(PF), ramelteon, sodium chloride 0.9%     Review of patient's allergies indicates:  No Known Allergies  Objective:     Vital Signs (Most Recent):  Temp: 98.1 °F (36.7 °C) (05/07/18 0752)  Pulse: 67 (05/07/18 0814)  Resp: (!) 26 (05/07/18 0814)  BP: (!) 97/56 (05/07/18 0800)  SpO2: 95 % (05/07/18 0814) Vital Signs (24h Range):  Temp:  [94.1 °F (34.5 °C)-98.1 °F (36.7 °C)] 98.1 °F (36.7 °C)  Pulse:  [44-97] 67  Resp:  [2-40] 26  SpO2:  [86 %-100 %] 95 %  BP: ()/(46-78) 97/56  Arterial Line BP: ()/() 102/48     Weight: 89.6 kg (197 lb 8.5 oz)  Body mass index is 26.79 kg/m².    Intake/Output - Last 3 Shifts       05/05 0700 - 05/06 0659 05/06 0700 - 05/07 0659 05/07 0700 - 05/08 0659    P.O. 425 120     I.V. (mL/kg)  256.7 (2.9)     IV Piggyback  1150     Total Intake(mL/kg) 425 (4.7) 1526.7 (17)     Urine (mL/kg/hr) 2455 (1.1) 2083 (1) 50 (0.2)    Total  Output 6768 7423 50    Net -2030 -556.3 -50           Urine Occurrence  1 x     Stool Occurrence 3 x 0 x 0 x          Physical Exam   Constitutional: He is oriented to person, place, and time. He appears well-developed and well-nourished. He is sedated and intubated.   HENT:   Head: Normocephalic and atraumatic.   Right Ear: External ear normal.   Left Ear: External ear normal.   Eyes: Conjunctivae are normal. Pupils are equal, round, and reactive to light.   Neck: Normal range of motion.   Cardiovascular: Normal rate and regular rhythm.    Pulmonary/Chest: Effort normal. He is intubated. No respiratory distress. He exhibits no tenderness.   Abdominal: Soft. He exhibits no distension and no mass.   Musculoskeletal: He exhibits no edema or tenderness.   Neurological: He is oriented to person, place, and time. He has normal reflexes. No cranial nerve deficit.   Skin: Skin is warm and dry. No rash noted. No erythema. No pallor.   Psychiatric: He has a normal mood and affect. His behavior is normal. Judgment and thought content normal.   Nursing note and vitals reviewed.      Significant Labs:  CBC:   Recent Labs  Lab 05/07/18  0339   WBC 23.80*   RBC 3.29*   HGB 10.1*   HCT 31.3*      MCV 95   MCH 30.9   MCHC 32.4     CMP:   Recent Labs  Lab 05/07/18  0339   *   CALCIUM 8.2*   ALBUMIN 2.2*   PROT 4.7*   *   K 4.1   CO2 25   CL 96   BUN 15   CREATININE 0.7   ALKPHOS 49*   ALT 18   AST 27   BILITOT 0.8     ABGs:   Recent Labs  Lab 05/07/18  0638   PH 7.407   PCO2 45.6*   PO2 99   HCO3 28.7*   POCSATURATED 98   BE 4       Significant Diagnostics:  I have reviewed all pertinent imaging results/findings within the past 24 hours.

## 2018-05-07 NOTE — PROGRESS NOTES
05/07/18 0000   PRE-TX-O2-ETCO2   Oxygen Concentration (%) 50   SpO2 98 %   ETCO2 (mmHg) 28 mmHg   Pulse 63   Resp (!) 26   Temp 96.3 °F (35.7 °C)       Airway - Non-Surgical 05/06/18 1759 Endotracheal Tube   Placement Date/Time: 05/06/18 1759   Present Prior to Hospital Arrival?: No  Method of Intubation: Direct laryngoscopy  Inserted by: MD  Airway Device: Endotracheal Tube  Blade: [place] #4  Airway Device Size: 8.0  Style: Cuffed  Cuff Inflation: Min...   Secured at 24 cm   Measured At Lips   Secured Location Center   Secured by Commercial tube avelar   Bite Block none   Site Condition Dry   Status Intact;Secured;Patent   Site Assessment Clean;Dry;No bleeding;No drainage   Cuff Pressure 30 cm H2O       Arterial Line 05/06/18 0954 Left Radial   Placement Date/Time: 05/06/18 0954   Present Prior to Hospital Arrival?: No  Size: 20  Orientation: Left  Location: Radial  Placement directed by: Anatomic Landmarks  Site Prep: Chlorhexidine   Local Anesthetic: None  Inserted by: Respiratory Therapis...   Site Assessment Positional   Line Status Pulsatile blood flow;Positional   Art Line Waveform Dampened   Vent Select   Charged w/in last 24h YES   Preset Conventional Ventilator Settings   Vent Type    Ventilation Type VC   Vent Mode A/C   Humidity HME   Set Rate 26 bmp   Vt Set 490 mL   PEEP/CPAP 12 cmH20   Pressure Support 0 cmH20   Waveform RAMP   Peak Flow 72 L/min   Set Inspiratory Pressure 0 cmH20   Insp Time 0 Sec(s)   Plateau Set/Insp. Hold (sec) 0   Insp Rise Time  0 %   I-Trigger Type  Flow   Trigger Sensitivity Flow/I-Trigger 2 L/min   P High 0 cm H2O   P Low 0 cm H2O   T High 0 sec   T Low 0 sec   Patient Ventilator Parameters   Resp Rate Total 26 br/min   Peak Airway Pressure 30 cmH2O   Mean Airway Pressure 19 cmH20   Plateau Pressure 32 cmH20   Exhaled Vt 503 mL   Total Ve 13 mL   Spont Ve 0 L   I:E Ratio Measured 1:2.10   Conventional Ventilator Alarms   Alarms On Y   Ve High Alarm 27.5 L/min    Resp Rate High Alarm 40 br/min   Press High Alarm 43 cmH2O   Apnea Rate 10   Apnea Volume (mL) 600 mL   Apnea Oxygen Concentration  100   Apnea Flow Rate (L/min) 70   T Apnea 20 sec(s)   Ready to Wean/Extubation Screen   FIO2<=50 (chart decimal) 0.5   MV<16L (chart vol.) 13   PEEP <=8 (chart #) (!) 12   Ready to Wean Parameters   F/VT Ratio<105 (RSBI) (!) 51.69

## 2018-05-08 NOTE — PROGRESS NOTES
Progress Note  PULMONARY    Admit Date: 4/26/2018 05/08/2018      SUBJECTIVE:     May 7, not arousing, sedate on vent and warming blanket.  May 8, partial eye movement with tactile stimuli    PFSH and Allergies reviewed.    OBJECTIVE:     Vitals (Most recent):  Vitals:    05/08/18 1605   BP: (!) 104/58   Pulse: 63   Resp: (!) 30   Temp: (!) 95.9 °F (35.5 °C)       Vitals (24 hour range):  Temp:  [95.9 °F (35.5 °C)-98.4 °F (36.9 °C)]   Pulse:  [62-76]   Resp:  [30]   BP: ()/(49-72)   SpO2:  [97 %-100 %]   Arterial Line BP: ()/(47-95)       Intake/Output Summary (Last 24 hours) at 05/08/18 1751  Last data filed at 05/08/18 1741   Gross per 24 hour   Intake         11002.82 ml   Output             1287 ml   Net          8790.82 ml          Physical Exam:  The patient's neuro status (alertness,orientation,cognitive function,motor skills,), pharyngeal exam (oral lesions, hygiene, abn dentition,), Neck (jvd,mass,thyroid,nodes in neck and above/below clavicle),RESPIRATORY(symmetry,effort,fremitus,percussion,auscultation),  Cor(rhythm,heart tones including gallops,perfusion,edema)ABD(distention,hepatic&splenomegaly,tenderness,masses), Skin(rash,cyanosis),Psyc(affect,judgement,).  Exam negative except for these pertinent findings:    Intubated, no distress, chest is symmetric, no distress, normal percussion, normal fremitus and good normal breath sounds - bronchial pattern noted anterior, no edema, not arousing.      Radiographs reviewed: view by direct vision  C/w ards but better 4/8  Results for orders placed during the hospital encounter of 04/26/18   X-Ray Chest 1 View    Narrative EXAMINATION:  XR CHEST 1 VIEW    CLINICAL HISTORY:  et tube placement;    TECHNIQUE:  Single, AP chest radiograph.    COMPARISON:  05/06/2018, 6:48 a.m.    FINDINGS:  ET tube tip projects over the mid trachea.  Right IJ CVL tip projects over the upper SVC.  An enteric tube courses into the abdomen, its tip not included on the  study.  Transverse cardiac diameter appears within normal limits.  Severe mixed alveolar and interstitial opacities are again noted in each lung.  Small pleural effusions are noted.  No radiographically apparent pneumothorax.  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      Impression The ET tube tip projects over the mid trachea.    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    The preliminary and final reports are concordant.      Electronically signed by: Adelita Ortega MD  Date:    05/07/2018  Time:    07:25   ]    Labs       Recent Labs  Lab 05/08/18  0309   WBC 22.40*   HGB 10.2*   HCT 30.9*   *   BAND 1.0       Recent Labs  Lab 05/08/18  0309   *   K 4.0   CL 95   CO2 26   BUN 21   CREATININE 0.8      CALCIUM 8.1*   MG 1.8   PHOS 2.4*   AST 27   ALT 20   ALKPHOS 55   BILITOT 1.0   PROT 5.1*   ALBUMIN 2.2*     No results for input(s): PH, PCO2, PO2, HCO3 in the last 24 hours.  Microbiology Results (last 7 days)     Procedure Component Value Units Date/Time    Culture, Respiratory with Gram Stain [[id number]] Collected:  05/06/18 1809    Order Status:  Completed Specimen:  Respiratory from Sputum, Expectorated Updated:  05/08/18 1016     Respiratory Culture Normal respiratory jaleel     Gram Stain (Respiratory) <10 epithelial cells per low power field.     Gram Stain (Respiratory) Rare WBC's     Gram Stain (Respiratory) No organisms seen    Blood culture [[id number]] Collected:  05/06/18 1440    Order Status:  Completed Specimen:  Blood from Antecubital, Left Updated:  05/08/18 0612     Blood Culture, Routine No Growth to date     Blood Culture, Routine No Growth to date    Blood culture [[id number]] Collected:  05/06/18 1440    Order Status:  Completed Specimen:  Blood from Line, Arterial Updated:  05/08/18 0612     Blood Culture, Routine No Growth to date     Blood Culture, Routine No Growth to date     Clostridium difficile EIA [[id number]]     Order Status:  No result Specimen:  Stool from Stool           Impression:  Active Hospital Problems    Diagnosis  POA    Acute hypoxemic respiratory failure [J96.01]  Clinically Undetermined    ARDS (adult respiratory distress syndrome) [J80]  Yes    Pneumonia due to infectious organism [J18.9]  Yes    SBO (small bowel obstruction) [K56.609]  Yes    Non-intractable vomiting with nausea [R11.2]  Yes    Malnutrition of moderate degree [E44.0]  Yes    Hepatitis C [B19.20]  Yes    CAD (coronary artery disease) [I25.10]  Yes    History of hypertension [Z86.79]  Not Applicable    Paraplegia [G82.20]  Yes      Resolved Hospital Problems    Diagnosis Date Resolved POA   No resolved problems to display.               Plan:   May 7, still septic, and ards moderate by [place].  At 6 ft. Needs tv 466 for 6 cc/kg.  Continue support - was very debilitated prior c4 paraplegic,and hard to evaluate but expect poor recovery.  Cont abx for pneumonia etc...  May 8, 2018 wbc still up, cxr better with clearer right lung.  Sputum nl jaleel. Cut peep and rate, wean soon ?                                    .

## 2018-05-08 NOTE — PLAN OF CARE
Problem: Patient Care Overview  Goal: Plan of Care Review  Outcome: Ongoing (interventions implemented as appropriate)  Pt remains intubated and sedated on Versed and Fentanyl. Opens eyes to voice. Falls back to sleep easily. Remains on clinemex for nutrition support. Daija positional. Lasix given and urine output increased from earlier. Remains edematous. Granddaughter in and updated on pt condition . Temp normal after oscar huggar use. Safety measures in place. On rotation bed. No falls or injury.

## 2018-05-08 NOTE — PROGRESS NOTES
05/07/18 2000   Patient Assessment/Suction   Level of Consciousness (AVPU) responds to voice   All Lung Fields Breath Sounds diminished   Rhythm/Pattern, Respiratory ventilator assisted   PRE-TX-O2-ETCO2   Oxygen Concentration (%) 40   SpO2 98 %   ETCO2 (mmHg) 28 mmHg   Pulse 67   Resp (!) 30   Temp 96.6 °F (35.9 °C)   BP (!) 88/49   Wound Care   Skin Temperature warm       Airway - Non-Surgical 05/06/18 1759 Endotracheal Tube   Placement Date/Time: 05/06/18 1759   Present Prior to Hospital Arrival?: No  Method of Intubation: Direct laryngoscopy  Inserted by: MD  Airway Device: Endotracheal Tube  Blade: Everton #4  Airway Device Size: 8.0  Style: Cuffed  Cuff Inflation: Min...   Secured at 23 cm   Measured At Lips   Secured Location Center   Secured by Commercial tube avelar   Bite Block none   Site Condition Dry   Status Intact;Secured   Site Assessment Clean;Dry;No bleeding;No drainage   Cuff Pressure 30 cm H2O       Arterial Line 05/06/18 0954 Left Radial   Placement Date/Time: 05/06/18 0954   Present Prior to Hospital Arrival?: No  Size: 20  Orientation: Left  Location: Radial  Placement directed by: Anatomic Landmarks  Site Prep: Chlorhexidine   Local Anesthetic: None  Inserted by: Respiratory Therapis...   Site Assessment Clean;Dry;Intact   Line Status Positional   Art Line Waveform Dampened   Arterial Line Interventions Zeroed and calibrated;Leveled;Flushed per protocol   Color/Movement/Sensation Capillary refill less than 3 sec   Dressing Type Transparent   Dressing Status Clean;Dry;Intact   Vent Select   Charged w/in last 24h YES   Preset Conventional Ventilator Settings   Vent Type    Ventilation Type VC   Vent Mode A/C   Humidity HME   Set Rate 30 bmp   Vt Set 460 mL   PEEP/CPAP 15 cmH20   Pressure Support 0 cmH20   Waveform RAMP   Peak Flow 75 L/min   Set Inspiratory Pressure 0 cmH20   Insp Time 0 Sec(s)   Plateau Set/Insp. Hold (sec) 0   Insp Rise Time  0 %   I-Trigger Type  Flow   Trigger  Sensitivity Flow/I-Trigger 2 L/min   P High 0 cm H2O   P Low 0 cm H2O   T High 0 sec   T Low 0 sec   Patient Ventilator Parameters   Resp Rate Total 30 br/min   Peak Airway Pressure 33 cmH2O   Mean Airway Pressure 22 cmH20   Plateau Pressure 32 cmH20   Exhaled Vt 475 mL   Total Ve 14.2 mL   Spont Ve 0 L   I:E Ratio Measured 1:2.00   Conventional Ventilator Alarms   Alarms On Y   Ve High Alarm 27.5 L/min   Resp Rate High Alarm 40 br/min   Press High Alarm 43 cmH2O   Apnea Rate 10   Apnea Volume (mL) 600 mL   Apnea Oxygen Concentration  100   Apnea Flow Rate (L/min) 70   T Apnea 20 sec(s)   Ready to Wean/Extubation Screen   FIO2<=50 (chart decimal) 0.4   MV<16L (chart vol.) 14.2   PEEP <=8 (chart #) (!) 15   Ready to Wean Parameters   F/VT Ratio<105 (RSBI) (!) 63.16

## 2018-05-08 NOTE — PLAN OF CARE
05/08/18 0734   Patient Assessment/Suction   Level of Consciousness (AVPU) responds to voice   Respiratory Effort Unlabored   Expansion/Accessory Muscles/Retractions no use of accessory muscles   All Lung Fields Breath Sounds diminished   Rhythm/Pattern, Respiratory ventilator assisted   Suction Method endotracheal tube   $ Suction Charges Inline Suction Procedure Stat Charge   Sputum Amount scant   Sputum Color clear   Sputum Consistency thin   PRE-TX-O2-ETCO2   O2 Device (Oxygen Therapy) ventilator   $ Is the patient on Low Flow Oxygen? Yes   Oxygen Concentration (%) 40   SpO2 99 %   Pulse Oximetry Type Continuous   $ Pulse Oximetry - Multiple Charge Pulse Oximetry - Multiple   ETCO2 (mmHg) 28 mmHg   $ ETCO2 Charge Exhaled CO2 Monitoring   $ ETCO2 Usage Currently wearing   Pulse 67   Resp (!) 30   Temp 97.7 °F (36.5 °C)   BP (!) 105/55   Aerosol Therapy   $ Aerosol Therapy Charges PRN treatment not required       Airway - Non-Surgical 05/06/18 1759 Endotracheal Tube   Placement Date/Time: 05/06/18 1759   Present Prior to Hospital Arrival?: No  Method of Intubation: Direct laryngoscopy  Inserted by: MD  Airway Device: Endotracheal Tube  Blade: Everton #4  Airway Device Size: 8.0  Style: Cuffed  Cuff Inflation: Min...   Secured at 23 cm   Measured At Lips   Secured Location Center   Secured by Commercial tube avelar   Bite Block none   Site Condition Cool;Dry   Status Intact;Secured;Patent   Site Assessment Clean;Dry   Cuff Pressure 30 cm H2O   Respiratory Interventions   Airway/Ventilation Management airway patency maintained   Respiratory Support HOB elevated   Vent Select   $ Ventilator Subsequent 1   Charged w/in last 24h YES   IHI Ventilator Associated Pneumonia Bundle   Head of Bed Elevated  HOB 30   Oral Care Mouth suctioned   Preset Conventional Ventilator Settings   Vent Type    Ventilation Type VC   Vent Mode A/C   Humidity HME   Set Rate 30 bmp   Vt Set 460 mL   PEEP/CPAP 15 cmH20   Pressure  Support 0 cmH20   Waveform RAMP   Peak Flow 75 L/min   Set Inspiratory Pressure 0 cmH20   Insp Time 0 Sec(s)   Plateau Set/Insp. Hold (sec) 0   Insp Rise Time  0 %   Trigger Sensitivity Flow/I-Trigger 2 L/min   P High 0 cm H2O   P Low 0 cm H2O   T High 0 sec   T Low 0 sec   Patient Ventilator Parameters   Resp Rate Total 30 br/min   Peak Airway Pressure 32 cmH2O   Mean Airway Pressure 21 cmH20   Plateau Pressure 32 cmH20   Exhaled Vt 470 mL   Total Ve 14.2 mL   Spont Ve 0 L   I:E Ratio Measured 1:2.00   Conventional Ventilator Alarms   Ve High Alarm 27.5 L/min   Resp Rate High Alarm 40 br/min   Press High Alarm 43 cmH2O   Apnea Rate 10   Apnea Volume (mL) 600 mL   Apnea Oxygen Concentration  100   Apnea Flow Rate (L/min) 70   T Apnea 20 sec(s)   Ready to Wean/Extubation Screen   FIO2<=50 (chart decimal) 0.4   MV<16L (chart vol.) 14.2   PEEP <=8 (chart #) (!) 15   Ready to Wean Parameters   F/VT Ratio<105 (RSBI) (!) 63.83

## 2018-05-08 NOTE — PROGRESS NOTES
Spoke with Dr. Sorensen concerning patient status and low UO for the day. Order received and placed on chart for Lasix 20 mg IVP once. Reported to primary RN.

## 2018-05-08 NOTE — PLAN OF CARE
Problem: Patient Care Overview  Goal: Plan of Care Review  Outcome: Ongoing (interventions implemented as appropriate)  Remains intubated and sedated. Opens eyes to voice. TPN re-ordered per MD. Lap sites  To abd with steri strips intact. No BM today. UO better today. Safety maintained.

## 2018-05-08 NOTE — PROGRESS NOTES
Progress Note  Hospital Medicine  Patient Name:Yamil Santiago Jr.  MRN:  [id number]  Patient Class: IP- Inpatient  Admit Date: 4/26/2018  Length of Stay: 11 days  Expected Discharge Date:   Attending Physician: Kimberly Sorensen MD  Primary Care Provider:  Tima Schuster MD    SUBJECTIVE:     Principal Problem: Vomiting for 2 days and dysuria  Initial history of present illness: Patient is a 89 y.o. male admitted to Hospitalist Service from [facility] Emergency Room with complaint of vomiting for 2 days and dysuria. 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 Part of the history obtained from patient's wife. Patient has been taking PO Keflex for UTI diagnosed 4 days ago.  presents to the ED with vomiting, constipation,frequent urination, decreased urination, decreased appetite with an onset x 1 day PTA. The patient reports that he was recently discharged from the hospital x 5 days ago for a UTI. Patient reports that he has been taking his antibiotics (500mg Keflex)  as directed, but believes they are making him feel badly. He states that he began vomiting yesterday. He denies nausea at this time .  He states that the vomit was clear. Patient states that he has not eaten in two days. He claims that he has not had a bowel movement in 2-3 days. Patient denies cough, swelling,hematuria, hematemesis, bright red blood in stool, or any other symptoms at this time. Family reports that they are concerned that the patient may have diabetes. They report that the patient has not seen his PCP since August 2017. No bed sores reported. Patient denied chest pain, shortness of breath, headache, vision changes, focal neuro-deficits, cough or fever.    PMH/PSH/SH/FH/Meds: reviewed.    Symptoms/Review of Systems: In ICU with respiratory failure and aspiration pneumonia. s/p exploratory laparotomy, adhesiolysis with  extensive adhesions with release of SBO. Patient is sedated and on ventilator.   Diet:  TPN + Lipids. NPO  Activity level: Quadriplegic  Pain:  Patient reports no pain.       OBJECTIVE:   Vital Signs (Most Recent):      Temp: 97.9 °F (36.6 °C) (05/08/18 0745)  Pulse: 69 (05/08/18 0745)  Resp: (!) 30 (05/08/18 0745)  BP: (!) 105/55 (05/08/18 0734)  SpO2: 99 % (05/08/18 0745)       Vital Signs Range (Last 24H):  Temp:  [96.4 °F (35.8 °C)-98.4 °F (36.9 °C)]   Pulse:  [61-76]   Resp:  [26-31]   BP: ()/(48-72)   SpO2:  [96 %-100 %]   Arterial Line BP: ()/(43-95)     Weight: 89.6 kg (197 lb 8.5 oz)  Body mass index is 26.79 kg/m².    Intake/Output Summary (Last 24 hours) at 05/08/18 0843  Last data filed at 05/08/18 0732   Gross per 24 hour   Intake          8806.56 ml   Output             1022 ml   Net          7784.56 ml     Physical Examination:  General appearance: well developed, appears stated age, on ventilator  Head: normocephalic, atraumatic  Eyes:  conjunctivae/corneas clear. PERRL.  Nose: Nares normal. Septum midline.  Throat: lips, mucosa, and tongue normal; teeth and gums normal, no throat erythema.  Neck: supple, symmetrical, trachea midline, no JVD and thyroid not enlarged, symmetric, no tenderness/mass/nodules  Lungs:  clear to auscultation bilaterally and normal respiratory effort  Chest wall: no tenderness  Heart: regular rate and rhythm, S1, S2 normal, no murmur, click, rub or gallop  Abdomen: soft, non-tender non-distented; bowel sounds hypoactive; no masses,  no organomegaly. Abdominal dressings C/D/I.  Extremities: no cyanosis, clubbing. 2+ edema.   Pulses: 2+ and symmetric  Skin: Skin color, texture, turgor normal. No rashes or lesions.  Lymph nodes: Cervical, supraclavicular, and axillary nodes normal.  Neurologic: sedated    CBC:    Recent Labs  Lab 05/06/18  0323 05/07/18  0339 05/08/18  0309   WBC 21.10* 23.80* 22.40*   RBC 3.52* 3.29* 3.27*   HGB 11.0* 10.1* 10.2*   HCT 32.8* 31.3*  30.9*    173 146*   MCV 93 95 95   MCH 31.4* 30.9 31.3*   MCHC 33.6 32.4 33.0   BMP    Recent Labs  Lab 05/06/18  0323 05/07/18  0339 05/08/18  0309   * 122* 110   * 127* 127*   K 4.4 4.1 4.0    96 95   CO2 21* 25 26   BUN 10 15 21   CREATININE 0.6 0.7 0.8   CALCIUM 7.8* 8.2* 8.1*   MG 1.8 1.8 1.8      Diagnostic Results:  Microbiology Results (last 7 days)     Procedure Component Value Units Date/Time    Blood culture [[id number]] Collected:  05/06/18 1440    Order Status:  Completed Specimen:  Blood from Antecubital, Left Updated:  05/08/18 0612     Blood Culture, Routine No Growth to date     Blood Culture, Routine No Growth to date    Blood culture [[id number]] Collected:  05/06/18 1440    Order Status:  Completed Specimen:  Blood from Line, Arterial Updated:  05/08/18 0612     Blood Culture, Routine No Growth to date     Blood Culture, Routine No Growth to date    Culture, Respiratory with Gram Stain [[id number]] Collected:  05/06/18 1809    Order Status:  Completed Specimen:  Respiratory from Sputum, Expectorated Updated:  05/07/18 0342     Gram Stain (Respiratory) <10 epithelial cells per low power field.     Gram Stain (Respiratory) Rare WBC's     Gram Stain (Respiratory) No organisms seen    Clostridium difficile EIA [[id number]]     Order Status:  No result Specimen:  Stool from Stool          CT abdomen and pelvis with contrast: Probable adynamic ileus with all the small bowel loops containing fluid but without marked distention seen.  The colon contains stool but is not distended.  Severe prostate hypertrophy.  Bilateral small pleural effusions right greater than left and trace ascites around the liver.    KUB: Appropriately positioned nasogastric tube.  Improved bowel gas pattern with no distinct loops of dilated small bowel evident on this exam.    KUB: NG tube removed and interval increase in the air-filled loops of bowel suggesting ileus.    CXR: The ET tube tip projects over  the mid trachea. 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    CXR: Interval increase in alveolar opacity in the left lung consistent with atelectasis given the rapid interval change.  Overall, there has otherwise been an interval improvement in aeration of each lung noting that persistent severe pulmonary disease persists.  Differential considerations include pneumonia, pulmonary edema and ARDS.    LUE venous doppler: No evidence for left upper extremity venous thrombosis.    CT abdomen and pelvis: No evidence for intra-abdominal postoperative complication.  Lung findings compatible with multifocal pneumonia.  Small bilateral pleural effusions. Prostatomegaly, with evidence for chronic bladder outlet obstruction.  A Gonzales catheter is present within the urinary bladder however the urinary bladder remains moderately distended.  New minimal bilateral hydronephrosis is nonspecific however may be attributable to bladder outlet obstruction. Anasarca.    CXR: Appropriate position of support devices. Diffuse bilateral interstitial and airspace infiltrates may represent pneumonia, edema, or ARDS.  No significant change.    CTA chest: No evidence for pulmonary thromboembolic disease. Multifocal pneumonia. Moderate bilateral pleural effusions with compressive lower lobe atelectasis. 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    Assessment/Plan:      Aspiration Pneumonia - multifocal pneumonia  Acute Hypoxic respiratory failure  ARDS  Continue mechanical ventilator, follow pulmonary recommendations.  Continue beta 2 agonist bronchodilator treatments.   Continue IV antibiotics -Vancomycin, Cipro and Zosyn.  Check sputum GS and Cx.   Continue routine medications as before.     Small Bowel Obstruction s/p exploratory laparotomy, adhesiolysis with extensive adhesions with release of SBO.  Post-op  Ileus  Continue to follow Dr. Pñea's recommendations. NPO.  Needs aggressive incentive spirometry.  Follow hemoglobin and hematocrit closely.  Pain control with PO narcotics and antiemetics as needed.      Yes    Malnutrition of moderate degree [E44.0]  Diet supplementation to be given once PO intake starts. Will encourage PO and monitor closely for weight changes.      Yes    Hepatitis C [B19.20]  Trend LFTs.     UTI  Urine C/S - NTD. Continue IV Cipro.     Hypophosphatemia  Replete phosphorus, follow level.    Hyponatremia - likely related to TPN use  Closely follow BMP.     Yes    CAD (coronary artery disease) [I25.10]  Patient with known CAD and monitor for S/Sx of angina/ACS. Continue to monitor on telemetry.      Yes    History of hypertension [Z86.79]  Continue chronic medications and monitor for any changes, adjusting as needed.      Not Applicable    Paraplegia [G82.20]  Supportive care.  Fall precautions.  Skin care.   Yes          DVT prophylaxis: Lovenox 40 mg SQ q day.     Kimberly Sorensen MD  Department of Hospital Medicine   [facility]

## 2018-05-09 NOTE — CONSULTS
[facility]  Adult Nutrition  Progress Note    SUMMARY       Recommendations    1)  Enteral: Impact Peptide 1.5 @ 10 mls/hr to progress 10 mls Q 6 hrs to goal 50 mls/hr. Flush with 150 mls Q 4 hrs. Provides 1800 calories, 113 gms protein, 168 gms CHO, 924 mls free water.  Hold if residuals > 250 mls.   2) progress to soft/bland diet per pt tolerance   3) Add Boost Plus with meals   Goals: 1) Pt will receive at least 85% EEN within 48 hrs  2) pt will receive at least 85% EEN during admit.   Nutrition Goal Status: 1) progressing/discontinued 2) new  Communication of RD Recs:  (POC, sticky note)    Reason for Assessment    Reason for Assessment: RD follow up  1. Non-intractable vomiting with nausea, unspecified vomiting type    2. Ileus    3. SBO (small bowel obstruction)    4. Coronary artery disease, angina presence unspecified, unspecified vessel or lesion type, unspecified whether native or transplanted heart    5. Chronic hepatitis C without hepatic coma    6. Malnutrition of moderate degree      Past Medical History:   Diagnosis Date    Hepatitis C     Neck injury     Paraplegic spinal paralysis      General Information Comments: Admitted with vomiting x 2 days., constipation. Pt reports . Notes he can eat all of his clear liquids, but it takes a while. <50% estimated energy needs since 4/26/18.    5/7/18: Pt was transferred to ICU over the weekend. Clinimix E 2.75/5 continues. Now on vent. Energy needs adjusted accordingly. Wt gain noted.   5/9/18 Nursing called requesting enteral feed rec as Clinimix E 5/15 @ 40 mls/hr with 20% lipids (250 mls) is being discontinued.      Nutrition Risk Screen    Nutrition Risk Screen: dysphagia or difficulty swallowing    Nutrition/Diet History    Patient Reported Diet/Restrictions/Preferences: general  Food Preferences: nno intolerances. does not use ONS at home  Do you have any cultural, spiritual, Zoroastrian conflicts, given your current  situation?: jehova's witness: no blood products of any kind  Food Allergies: NKFA    Anthropometrics    Temp: 97.6 °F (36.4 °C)  Height Method: Stated  Height: 6'  Height (inches): 72 in  Weight Method: Bed Scale  Weight: 89.6 kg (197 lb 8.5 oz)  Weight (lb): 197.53 lb  Ideal Body Weight (IBW), Male: 178 lb  % Ideal Body Weight, Male (lb): 104.9 lb  BMI (Calculated): 25.4  Usual Body Weight (UBW), k.9 kg (per chart review 18)  % Usual Body Weight: 101.16  % Weight Change From Usual Weight: 0.95 %       Lab/Procedures/Meds    Pertinent Labs Reviewed: reviewed  Lab Results   Component Value Date    ALBUMIN 2.1 (L) 2018     No results found for: CRP  Lab Results   Component Value Date    WBC 15.10 (H) 2018     Pertinent Medications Reviewed: reviewed  Scheduled Meds:   ciprofloxacin (CIPRO)400mg/200ml D5W IVPB  400 mg Intravenous Q12H    docusate sodium  100 mg Oral BID    enoxaparin  40 mg Subcutaneous Daily    magnesium sulfate in water  2 g Intravenous Once    metoclopramide HCl  10 mg Intravenous Q6H    pantoprazole  40 mg Intravenous BID    piperacillin-tazobactam (ZOSYN) IVPB  3.375 g Intravenous Q6H    polyethylene glycol  17 g Oral BID    tamsulosin  0.4 mg Oral Daily    vancomycin (VANCOCIN) IVPB  20 mg/kg Intravenous Q18H     Continuous Infusions:   Amino acid 5% - dextrose 15% (CLINIMIX-E) solution with additives (1L  provides 510 kcal/L dextrose, with 50 gm AA, 150 gm CHO, Na 35, K 30, Mg 5, Ca 4.5, Acetate 80, Cl 39, Phos 15) 45 mL/hr at 18 2158    Amino acid 5% - dextrose 15% (CLINIMIX-E) solution with additives (1L  provides 510 kcal/L dextrose, with 50 gm AA, 150 gm CHO, Na 35, K 30, Mg 5, Ca 4.5, Acetate 80, Cl 39, Phos 15)      fentanyl 50 mcg/hr (18)    midazolam (VERSED) infusion (titrating) 2 mg/hr (18)    norepinephrine bitartrate-D5W         Physical Findings/Assessment    Overall Physical Appearance: loss of subcutaneous fat, loss  of muscle mass  Oral/Mouth Cavity: WDL  Skin:  (Ammon score 12)   Edema 3+     Estimated/Assessed Needs    Weight Used For Calorie Calculations: 81.6 kg (179 lb 14.3 oz)     Energy Need Method: [place]: 1788 adjusted 2/2 vent   Kcal/kg  25-30 kcal/[id number]     Weight Used For Protein Calculations: 80.7 kg (178 lb)   1.2-1.5 gm/kg/day:      Fluid Need Method: RDA Method     CHO Requirement: n/a      Nutrition Prescription Ordered    Current Diet Order: NPO    Evaluation of Received Nutrient/Fluid Intake    Parenteral Calories: Clinimix E 5/15 @ 40 mls/hr with 20% lipids (250 mls). Provides 1182 calories, 48 gms protein, 960 mls fluid, GIR 1.23    Energy Calories Required: not meeting needs (38%)  Protein Required: not meeting needs (67%)  Fluid Required: per primary team    Intake/Output Summary (Last 24 hours) at 18 1402  Last data filed at 18 1225   Gross per 24 hour   Intake          1837.79 ml   Output             2005 ml   Net          -167.21 ml     Tolerance: tolerating  % Intake of Estimated Energy Needs: 50-75 % Meal Intake: 0-25 %    Nutrition Risk    Level of Risk/Frequency of Follow-up:  (2 x wkly)     Assessment and Plan    Malnutrition of moderate degree    Contributing Nutrition Diagnosis  Inadequate energy intake    Related to (etiology):   Acuity of illness    Signs and Symptoms (as evidenced by):   1) <50% estimated energy needs >5 days  2) Moderate muscle loss noted around clavicle  3) Moderate fat loss noted in upper arm, orbital  4) Hand , weak    Interventions/Recommendations (treatment strategy):  1) progress to soft/bland diet + Boost Plus with meals 2) If unable to progress, recommend increasing PPN to 125 mls/hr and add lipids VS TPN (disoncontinue) 3) progress to enteral feeds    Nutrition Diagnosis Status:   improving                Monitor and Evaluation    Food and Nutrient Intake: energy intake  Food and Nutrient Adminstration: diet order  Anthropometric  Measurements: weight, weight change  Biochemical Data, Medical Tests and Procedures: inflammatory profile  Nutrition-Focused Physical Findings: overall appearance, skin     Discharge Plan    Soft/bland with ONS for prn use  RD Follow-up?: Yes

## 2018-05-09 NOTE — PROGRESS NOTES
Progress Note  PULMONARY    Admit Date: 4/26/2018 05/09/2018      SUBJECTIVE:     May 7, not arousing, sedate on vent and warming blanket.  May 8, partial eye movement with tactile stimuli  May9,  Arouses slight      PFSH and Allergies reviewed.    OBJECTIVE:     Vitals (Most recent):  Vitals:    05/09/18 0730   BP: (!) 146/70   Pulse: 72   Resp: (!) 22   Temp: 97.9 °F (36.6 °C)       Vitals (24 hour range):  Temp:  [95.7 °F (35.4 °C)-98.4 °F (36.9 °C)]   Pulse:  [60-76]   Resp:  [20-30]   BP: ()/(50-70)   SpO2:  [98 %-100 %]   Arterial Line BP: ()/(47-90)       Intake/Output Summary (Last 24 hours) at 05/09/18 0938  Last data filed at 05/09/18 0500   Gross per 24 hour   Intake          2471.09 ml   Output             1070 ml   Net          1401.09 ml          Physical Exam:  The patient's neuro status (alertness,orientation,cognitive function,motor skills,), pharyngeal exam (oral lesions, hygiene, abn dentition,), Neck (jvd,mass,thyroid,nodes in neck and above/below clavicle),RESPIRATORY(symmetry,effort,fremitus,percussion,auscultation),  Cor(rhythm,heart tones including gallops,perfusion,edema)ABD(distention,hepatic&splenomegaly,tenderness,masses), Skin(rash,cyanosis),Psyc(affect,judgement,).  Exam negative except for these pertinent findings:    Intubated, no distress, chest is symmetric, no distress, normal percussion, normal fremitus and good normal breath sounds - bronchial pattern noted anterior, no edema, not arousing.      Radiographs reviewed: view by direct vision  Right lower lung near clear, left with persistent effusion and atelectasis      Results for orders placed during the hospital encounter of 04/26/18   X-Ray Chest 1 View    Narrative EXAMINATION:  XR CHEST 1 VIEW    CLINICAL HISTORY:  et tube placement;    TECHNIQUE:  Single, AP chest radiograph.    COMPARISON:  05/06/2018, 6:48 a.m.    FINDINGS:  ET tube tip projects over the mid trachea.  Right IJ CVL tip projects over the upper  SVC.  An enteric tube courses into the abdomen, its tip not included on the study.  Transverse cardiac diameter appears within normal limits.  Severe mixed alveolar and interstitial opacities are again noted in each lung.  Small pleural effusions are noted.  No radiographically apparent pneumothorax.  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      Impression The ET tube tip projects over the mid trachea.    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    The preliminary and final reports are concordant.      Electronically signed by: Adelita Ortega MD  Date:    05/07/2018  Time:    07:25   ]    Labs       Recent Labs  Lab 05/09/18  0305   WBC 15.10*   HGB 9.9*   HCT 30.7*   *   BAND 3.0   METAMYELOCYT 2.0       Recent Labs  Lab 05/09/18  0305   *   K 3.8   CL 95   CO2 26   BUN 17   CREATININE 0.7   *   CALCIUM 8.0*   MG 1.8   PHOS 2.9   AST 29   ALT 20   ALKPHOS 63   BILITOT 1.0   PROT 5.0*   ALBUMIN 2.1*       Recent Labs  Lab 05/09/18  0824   PH 7.324*   PCO2 53.1*   PO2 113*   HCO3 27.6     Microbiology Results (last 7 days)     Procedure Component Value Units Date/Time    Blood culture [[id number]] Collected:  05/06/18 1440    Order Status:  Completed Specimen:  Blood from Antecubital, Left Updated:  05/09/18 0612     Blood Culture, Routine No Growth to date     Blood Culture, Routine No Growth to date     Blood Culture, Routine No Growth to date    Blood culture [[id number]] Collected:  05/06/18 1440    Order Status:  Completed Specimen:  Blood from Line, Arterial Updated:  05/09/18 0612     Blood Culture, Routine No Growth to date     Blood Culture, Routine No Growth to date     Blood Culture, Routine No Growth to date    Culture, Respiratory with Gram Stain [[id number]] Collected:  05/06/18 1809    Order Status:  Completed Specimen:  Respiratory from Sputum, Expectorated Updated:  05/08/18 1016     Respiratory  Culture Normal respiratory jaleel     Gram Stain (Respiratory) <10 epithelial cells per low power field.     Gram Stain (Respiratory) Rare WBC's     Gram Stain (Respiratory) No organisms seen    Clostridium difficile EIA [[id number]]     Order Status:  No result Specimen:  Stool from Stool           Impression:  Active Hospital Problems    Diagnosis  POA    Acute hypoxemic respiratory failure [J96.01]  Clinically Undetermined    ARDS (adult respiratory distress syndrome) [J80]  Yes    Pneumonia due to infectious organism [J18.9]  Yes    SBO (small bowel obstruction) [K56.609]  Yes    Non-intractable vomiting with nausea [R11.2]  Yes    Malnutrition of moderate degree [E44.0]  Yes    Hepatitis C [B19.20]  Yes    CAD (coronary artery disease) [I25.10]  Yes    History of hypertension [Z86.79]  Not Applicable    Paraplegia [G82.20]  Yes      Resolved Hospital Problems    Diagnosis Date Resolved POA   No resolved problems to display.       Plan:   May 7, still septic, and ards moderate by Belmond Citeria.  At 6 ft. Needs tv 466 for 6 cc/kg.  Continue support - was very debilitated prior c4 paraplegic,and hard to evaluate but expect poor recovery.  Cont abx for pneumonia etc...  May 8, 2018 wbc still up, cxr better with clearer right lung.  Sputum nl jaleel. Cut peep and rate, wean soon ?    May 9, arouses slight, seems better but marginal with wean and extubation given abg's on full support.  May need some lasix - dose 20 x 1.  plts low - no transfusion Jehovah Witness per wife noted                                .

## 2018-05-09 NOTE — PLAN OF CARE
Problem: Patient Care Overview  Goal: Plan of Care Review  Outcome: Ongoing (interventions implemented as appropriate)  Remains intubated and sedated on versed and fentanyl remains on peep 15. Vss. McGrady positional . Urine output 650. Edematous with weeping upper extremities. On Tpn for nutrition. On rotation therapy. Safety measures in place.

## 2018-05-09 NOTE — PLAN OF CARE
Problem: Nutrition, Enteral (Adult)  Intervention: Monitor/Manage Nutrition Support  Recommendations    1)  Enteral: Impact Peptide 1.5 @ 10 mls/hr to progress 10 mls Q 6 hrs to goal 50 mls/hr. Flush with 150 mls Q 4 hrs. Provides 1800 calories, 113 gms protein, 168 gms CHO, 924 mls free water.  Hold if residuals > 250 mls.   2) progress to soft/bland diet per pt tolerance   3) Add Boost Plus with meals   Goals: 1) Pt will receive at least 85% EEN within 48 hrs  2) pt will receive at least 85% EEN during admit.   Nutrition Goal Status: 1) progressing/discontinued 2) new  Communication of RD Recs:  (POC, sticky note)

## 2018-05-09 NOTE — PLAN OF CARE
05/09/18 0711   Patient Assessment/Suction   Level of Consciousness (AVPU) responds to voice   Respiratory Effort Unlabored   Expansion/Accessory Muscles/Retractions no retractions;no use of accessory muscles   All Lung Fields Breath Sounds diminished   Rhythm/Pattern, Respiratory ventilator assisted   Cough Frequency infrequent   Suction Method in-line suction catheter (closed)   $ Suction Charges Inline Suction Procedure Stat Charge   Sputum Amount small   Sputum Color carl   Sputum Consistency thin   PRE-TX-O2-ETCO2   O2 Device (Oxygen Therapy) ventilator   $ Is the patient on Low Flow Oxygen? Yes   Oxygen Concentration (%) 40   SpO2 99 %   Pulse Oximetry Type Continuous   $ Pulse Oximetry - Multiple Charge Pulse Oximetry - Multiple   ETCO2 (mmHg) 25 mmHg   $ ETCO2 Charge Exhaled CO2 Monitoring   $ ETCO2 Usage Currently wearing   Pulse 60   Resp (!) 30   Aerosol Therapy   $ Aerosol Therapy Charges PRN treatment not required       Airway - Non-Surgical 05/06/18 1759 Endotracheal Tube   Placement Date/Time: 05/06/18 1759   Present Prior to Hospital Arrival?: No  Method of Intubation: Direct laryngoscopy  Inserted by: MD  Airway Device: Endotracheal Tube  Blade: Everton #4  Airway Device Size: 8.0  Style: Cuffed  Cuff Inflation: Min...   Secured at 23 cm   Measured At Lips   Secured Location Right   Secured by Commercial tube avelar   Bite Block none   Site Condition Cool;Dry   Status Intact;Secured;Patent   Site Assessment Clean;Dry   Cuff Pressure 30 cm H2O   Respiratory Interventions   Airway/Ventilation Management airway patency maintained   Respiratory Support HOB elevated   Vent Select   $ Ventilator Subsequent 1   Charged w/in last 24h YES   IHI Ventilator Associated Pneumonia Bundle   Head of Bed Elevated  HOB 30   Preset Conventional Ventilator Settings   Vent Type    Ventilation Type VC   Vent Mode A/C   Humidity HME   Set Rate 30 bmp   Vt Set 460 mL   PEEP/CPAP 15 cmH20   Pressure Support 0  cmH20   Waveform RAMP   Peak Flow 75 L/min   Set Inspiratory Pressure 0 cmH20   Insp Time 0 Sec(s)   Plateau Set/Insp. Hold (sec) 0   Insp Rise Time  0 %   Trigger Sensitivity Flow/I-Trigger 2 L/min   P High 0 cm H2O   P Low 0 cm H2O   T High 0 sec   T Low 0 sec   Patient Ventilator Parameters   Resp Rate Total 30 br/min   Peak Airway Pressure 31 cmH2O   Mean Airway Pressure 21 cmH20   Plateau Pressure 32 cmH20   Exhaled Vt 481 mL   Total Ve 14.2 mL   Spont Ve 0 L   I:E Ratio Measured 1:2.00   Conventional Ventilator Alarms   Ve High Alarm 27.5 L/min   Resp Rate High Alarm 40 br/min   Press High Alarm 43 cmH2O   Apnea Rate 10   Apnea Volume (mL) 600 mL   Apnea Oxygen Concentration  100   Apnea Flow Rate (L/min) 70   T Apnea 20 sec(s)   Ready to Wean/Extubation Screen   FIO2<=50 (chart decimal) 0.4   MV<16L (chart vol.) 14.2   PEEP <=8 (chart #) (!) 15   Ready to Wean Parameters   F/VT Ratio<105 (RSBI) (!) 62.37

## 2018-05-09 NOTE — CONSULTS
Yamil Santiago Jr. [id number] is a 89 y.o. male who has been consulted for vancomycin dosing.    The patient has the following labs:     Date Creatinine (mg/dl)    BUN WBC Count   5/9/2018 Estimated Creatinine Clearance: 78.5 mL/min (based on SCr of 0.7 mg/dL). Lab Results   Component Value Date    BUN 17 05/09/2018     Lab Results   Component Value Date    WBC 15.10 (H) 05/09/2018        Current weight is 89.6 kg (197 lb 8.5 oz)    Pt is receiving vancomycin 1750 mg every 18 hours.  Vancomycin trough from 05/09 at 1733 was 17.1 mg/dL.  Target trough range is 15-20 mg/dL.   Trough was drawn on time and anticipate it is therapeutic.  Pharmacy will current regimen.   A vancomycin trough would be ordered accordingly.      Patient will be followed by pharmacy for changes in renal function, toxicity, and efficacy.  Thank you for allowing us to participate in this patient's care.     Tian Bajwa, TimoteoD

## 2018-05-09 NOTE — NURSING
Impact Peptide TF started at 10cc hour.  TPN rate decreased to 25cc hour with orders to wean to off.

## 2018-05-09 NOTE — NURSING
Dr. Sorensen on rounds.  Updated. Labs reviewed.  Orders received.  Will do KUB and start tube feedings per dietician recommendations.  D/C TPN once tube feedings started.  Lasix 20mg ivp given

## 2018-05-09 NOTE — PROCEDURES
Results for NANCY LANGSTON JR. (MRN [id number]) as of 5/8/2018 21:16   Ref. Range 5/8/2018 21:10   POC PH Latest Ref Range: 7.35 - 7.45  7.398   POC PCO2 Latest Ref Range: 35 - 45 mmHg 45.3 (H)   POC PO2 Latest Ref Range: 80 - 100 mmHg 143 (H)   POC BE Latest Ref Range: -2 to 2 mmol/L 3   POC HCO3 Latest Ref Range: 24 - 28 mmol/L 27.9   POC SATURATED O2 Latest Ref Range: 95 - 100 % 99   POC TCO2 Latest Ref Range: 23 - 27 mmol/L 29 (H)   FiO2 Unknown 40   PEEP Unknown 15   Sample Unknown ARTERIAL   DelSys Unknown Adult Vent   Allens Test Unknown N/A   Site Unknown Robert/UAC   Mode Unknown AC/PRVC   Rate Unknown 30

## 2018-05-09 NOTE — PROGRESS NOTES
05/08/18 1900   Patient Assessment/Suction   Level of Consciousness (AVPU) responds to voice   Respiratory Effort Unlabored   Expansion/Accessory Muscles/Retractions no use of accessory muscles;no retractions   All Lung Fields Breath Sounds crackles;diminished   Rhythm/Pattern, Respiratory ventilator assisted   Cough Frequency infrequent   Cough Type productive;good   Suction Method required;in-line suction catheter (closed)   $ Suction Charges Inline Suction Procedure Stat Charge   Sputum Amount small   Sputum Color carl   Sputum Consistency thin   PRE-TX-O2-ETCO2   O2 Device (Oxygen Therapy) ventilator   Oxygen Concentration (%) 40   SpO2 98 %   Pulse Oximetry Type Intermittent   $ Pulse Oximetry - Multiple Charge Pulse Oximetry - Multiple   Oximetry Probe Site Intact;No Change Needed   ETCO2 (mmHg) 27 mmHg   $ ETCO2 Charge Exhaled CO2 Monitoring   $ ETCO2 Usage Currently wearing   Pulse 66   Resp (!) 30   Temp (!) 95.7 °F (35.4 °C)   BP (!) 99/55   Aerosol Therapy   $ Aerosol Therapy Charges PRN treatment not required       Airway - Non-Surgical 05/06/18 1759 Endotracheal Tube   Placement Date/Time: 05/06/18 1759   Present Prior to Hospital Arrival?: No  Method of Intubation: Direct laryngoscopy  Inserted by: MD  Airway Device: Endotracheal Tube  Blade: Everton #4  Airway Device Size: 8.0  Style: Cuffed  Cuff Inflation: Min...   Secured at 23 cm   Measured At Lips   Secured Location Right   Secured by Commercial tube avelar   Bite Block none   Site Condition Cool;Dry   Status Intact;Secured;Patent   Site Assessment Clean;Dry;No bleeding;No drainage   Cuff Pressure 30 cm H2O       Arterial Line 05/06/18 0954 Left Radial   Placement Date/Time: 05/06/18 0954   Present Prior to Hospital Arrival?: No  Size: 20  Orientation: Left  Location: Radial  Placement directed by: Anatomic Landmarks  Site Prep: Chlorhexidine   Local Anesthetic: None  Inserted by: Respiratory Therapis...   Line Status Positional   Vent Select    Charged w/in last 24h YES   Preset Conventional Ventilator Settings   Vent Type    Ventilation Type VC   Vent Mode A/C   Humidity HME   Set Rate 30 bmp   Vt Set 460 mL   PEEP/CPAP 15 cmH20   Pressure Support 0 cmH20   Waveform RAMP   Peak Flow 75 L/min   Set Inspiratory Pressure 0 cmH20   Insp Time 0 Sec(s)   Plateau Set/Insp. Hold (sec) 0   Insp Rise Time  0 %   I-Trigger Type  Flow   Trigger Sensitivity Flow/I-Trigger 2 L/min   P High 0 cm H2O   P Low 0 cm H2O   T High 0 sec   T Low 0 sec   Patient Ventilator Parameters   Resp Rate Total 30 br/min   Peak Airway Pressure 34 cmH2O   Mean Airway Pressure 22 cmH20   Plateau Pressure 32 cmH20   Exhaled Vt 518 mL   Total Ve 14.3 mL   Spont Ve 0 L   I:E Ratio Measured 1:2.00   Conventional Ventilator Alarms   Alarms On Y   Ve High Alarm 27.5 L/min   Resp Rate High Alarm 40 br/min   Press High Alarm 43 cmH2O   Apnea Rate 10   Apnea Volume (mL) 600 mL   Apnea Oxygen Concentration  100   Apnea Flow Rate (L/min) 70   T Apnea 20 sec(s)   Ready to Wean/Extubation Screen   FIO2<=50 (chart decimal) 0.4   MV<16L (chart vol.) 14.3   PEEP <=8 (chart #) (!) 15   Ready to Wean Parameters   F/VT Ratio<105 (RSBI) (!) 57.92   Airway Safety   Ambu bag with the patient? Yes, Adult Ambu   Is mask with the patient? Yes, Adult Mask

## 2018-05-09 NOTE — PROGRESS NOTES
Progress Note  Hospital Medicine  Patient Name:Yamil Santiago Jr.  MRN:  [id number]  Patient Class: IP- Inpatient  Admit Date: 4/26/2018  Length of Stay: 12 days  Expected Discharge Date:   Attending Physician: Kimberly Sorensen MD  Primary Care Provider:  Tima Schuster MD    SUBJECTIVE:     Principal Problem: Vomiting for 2 days and dysuria  Initial history of present illness: Patient is a 89 y.o. male admitted to Hospitalist Service from [facility] Room with complaint of vomiting for 2 days and dysuria. 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 Part of the history obtained from patient's wife. Patient has been taking PO Keflex for UTI diagnosed 4 days ago.  presents to the ED with vomiting, constipation,frequent urination, decreased urination, decreased appetite with an onset x 1 day PTA. The patient reports that he was recently discharged from the hospital x 5 days ago for a UTI. Patient reports that he has been taking his antibiotics (500mg Keflex)  as directed, but believes they are making him feel badly. He states that he began vomiting yesterday. He denies nausea at this time .  He states that the vomit was clear. Patient states that he has not eaten in two days. He claims that he has not had a bowel movement in 2-3 days. Patient denies cough, swelling,hematuria, hematemesis, bright red blood in stool, or any other symptoms at this time. Family reports that they are concerned that the patient may have diabetes. They report that the patient has not seen his PCP since August 2017. No bed sores reported. Patient denied chest pain, shortness of breath, headache, vision changes, focal neuro-deficits, cough or fever.    PMH/PSH/SH/FH/Meds: reviewed.    Symptoms/Review of Systems: In ICU with respiratory failure and aspiration pneumonia. s/p exploratory laparotomy, adhesiolysis with  extensive adhesions with release of SBO. Patient is sedated and on ventilator.   Diet:  TPN + Lipids. NPO  Activity level: Quadriplegic  Pain:  Patient reports no pain.       OBJECTIVE:   Vital Signs (Most Recent):      Temp: 97.9 °F (36.6 °C) (05/09/18 0730)  Pulse: 77 (05/09/18 0824)  Resp: (!) 23 (05/09/18 0824)  BP: (!) 146/70 (05/09/18 0730)  SpO2: 98 % (05/09/18 0824)       Vital Signs Range (Last 24H):  Temp:  [95.7 °F (35.4 °C)-98.4 °F (36.9 °C)]   Pulse:  [60-77]   Resp:  [20-30]   BP: ()/(50-70)   SpO2:  [98 %-100 %]   Arterial Line BP: ()/(47-90)     Weight: 89.6 kg (197 lb 8.5 oz)  Body mass index is 26.79 kg/m².    Intake/Output Summary (Last 24 hours) at 05/09/18 1012  Last data filed at 05/09/18 0500   Gross per 24 hour   Intake          2471.09 ml   Output             1070 ml   Net          1401.09 ml     Physical Examination:  General appearance: well developed, appears stated age, on ventilator  Head: normocephalic, atraumatic  Eyes:  conjunctivae/corneas clear. PERRL.  Nose: Nares normal. Septum midline.  Throat: lips, mucosa, and tongue normal; teeth and gums normal, no throat erythema.  Neck: supple, symmetrical, trachea midline, no JVD and thyroid not enlarged, symmetric, no tenderness/mass/nodules  Lungs:  clear to auscultation bilaterally and normal respiratory effort  Chest wall: no tenderness  Heart: regular rate and rhythm, S1, S2 normal, no murmur, click, rub or gallop  Abdomen: soft, non-tender non-distented; bowel sounds hypoactive; no masses,  no organomegaly. Abdominal dressings C/D/I.  Extremities: no cyanosis, clubbing. 2+ edema.   Pulses: 2+ and symmetric  Skin: Skin color, texture, turgor normal. No rashes or lesions.  Lymph nodes: Cervical, supraclavicular, and axillary nodes normal.  Neurologic: sedated    CBC:    Recent Labs  Lab 05/07/18  0339 05/08/18  0309 05/09/18  0305   WBC 23.80* 22.40* 15.10*   RBC 3.29* 3.27* 3.22*   HGB 10.1* 10.2* 9.9*   HCT 31.3* 30.9*  30.7*    146* 105*   MCV 95 95 96   MCH 30.9 31.3* 30.8   MCHC 32.4 33.0 32.2   BMP    Recent Labs  Lab 05/07/18  0339 05/08/18  0309 05/09/18  0305   * 110 114*   * 127* 126*   K 4.1 4.0 3.8   CL 96 95 95   CO2 25 26 26   BUN 15 21 17   CREATININE 0.7 0.8 0.7   CALCIUM 8.2* 8.1* 8.0*   MG 1.8 1.8 1.8      Diagnostic Results:  Microbiology Results (last 7 days)     Procedure Component Value Units Date/Time    Blood culture [[id number]] Collected:  05/06/18 1440    Order Status:  Completed Specimen:  Blood from Antecubital, Left Updated:  05/09/18 0612     Blood Culture, Routine No Growth to date     Blood Culture, Routine No Growth to date     Blood Culture, Routine No Growth to date    Blood culture [[id number]] Collected:  05/06/18 1440    Order Status:  Completed Specimen:  Blood from Line, Arterial Updated:  05/09/18 0612     Blood Culture, Routine No Growth to date     Blood Culture, Routine No Growth to date     Blood Culture, Routine No Growth to date    Culture, Respiratory with Gram Stain [[id number]] Collected:  05/06/18 1809    Order Status:  Completed Specimen:  Respiratory from Sputum, Expectorated Updated:  05/08/18 1016     Respiratory Culture Normal respiratory jaelel     Gram Stain (Respiratory) <10 epithelial cells per low power field.     Gram Stain (Respiratory) Rare WBC's     Gram Stain (Respiratory) No organisms seen    Clostridium difficile EIA [[id number]]     Order Status:  No result Specimen:  Stool from Stool          CT abdomen and pelvis with contrast: Probable adynamic ileus with all the small bowel loops containing fluid but without marked distention seen.  The colon contains stool but is not distended.  Severe prostate hypertrophy.  Bilateral small pleural effusions right greater than left and trace ascites around the liver.    KUB: Appropriately positioned nasogastric tube.  Improved bowel gas pattern with no distinct loops of dilated small bowel evident on this  exam.    KUB: NG tube removed and interval increase in the air-filled loops of bowel suggesting ileus.    CXR: The ET tube tip projects over the mid trachea. 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    CXR: Interval increase in alveolar opacity in the left lung consistent with atelectasis given the rapid interval change.  Overall, there has otherwise been an interval improvement in aeration of each lung noting that persistent severe pulmonary disease persists.  Differential considerations include pneumonia, pulmonary edema and ARDS.    LUE venous doppler: No evidence for left upper extremity venous thrombosis.    CT abdomen and pelvis: No evidence for intra-abdominal postoperative complication.  Lung findings compatible with multifocal pneumonia.  Small bilateral pleural effusions. Prostatomegaly, with evidence for chronic bladder outlet obstruction.  A Gonzales catheter is present within the urinary bladder however the urinary bladder remains moderately distended.  New minimal bilateral hydronephrosis is nonspecific however may be attributable to bladder outlet obstruction. Anasarca.    CXR: Appropriate position of support devices. Diffuse bilateral interstitial and airspace infiltrates may represent pneumonia, edema, or ARDS.  No significant change.    CTA chest: No evidence for pulmonary thromboembolic disease. Multifocal pneumonia. Moderate bilateral pleural effusions with compressive lower lobe atelectasis. 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    CXR: Stable radiographic appearance to the chest.  Findings most consistent with multilobar pneumonia, most pronounced in the left lung base.    KUB: Nonobstructive bowel gas pattern with appropriately positioned NG tube.    Assessment/Plan:      Aspiration Pneumonia - multifocal pneumonia  Acute Hypoxic respiratory  failure  ARDS  Continue mechanical ventilator, follow pulmonary recommendations.  Continue beta 2 agonist bronchodilator treatments.   Continue IV antibiotics -Vancomycin, Cipro and Zosyn.  Check sputum GS and Cx.   Continue routine medications as before.     Small Bowel Obstruction s/p exploratory laparotomy, adhesiolysis with extensive adhesions with release of SBO.  Post-op Ileus  Continue to follow Dr. Peña's recommendations. NPO.  Needs aggressive incentive spirometry.  Follow hemoglobin and hematocrit closely.  Pain control with PO narcotics and antiemetics as needed.      Yes    Malnutrition of moderate degree [E44.0]  Diet supplementation to be given once PO intake starts. Will encourage PO and monitor closely for weight changes.   Wean off TPN as it is contributing to hyponatremia and start enteral tube feeding after KUB is non-obstructive.     Yes    Hepatitis C [B19.20]  Trend LFTs.     UTI  Urine C/S - NTD. Continue IV Cipro.     Hypophosphatemia  Replete phosphorus, follow level.    Hyponatremia - likely related to TPN use  Closely follow BMP.     Yes    CAD (coronary artery disease) [I25.10]  Patient with known CAD and monitor for S/Sx of angina/ACS. Continue to monitor on telemetry.      Yes    History of hypertension [Z86.79]  Continue chronic medications and monitor for any changes, adjusting as needed.      Not Applicable    Paraplegia [G82.20]  Supportive care.  Fall precautions.  Skin care.   Yes          DVT prophylaxis: Lovenox 40 mg SQ q day.     Kimberly Sorensen MD  Department of Hospital Medicine   [facility]

## 2018-05-10 NOTE — PLAN OF CARE
Problem: Patient Care Overview  Goal: Plan of Care Review  Remains intubated and sedated.  Weaned Peep from 15 to 10.  Maintaining O2 sats in the upper 90's. Tube feedings started this afternoon.  Hypo bowel sounds. Will increase as tolerated. Gonzales intact.  Diuresing well after lasix dose today.  Edematous.  BUE weeping.  Rotation bed on.  Safety maintained.

## 2018-05-10 NOTE — PROGRESS NOTES
Progress Note  Hospital Medicine  Patient Name:Yamil Santiago Jr.  MRN:  [id number]  Patient Class: IP- Inpatient  Admit Date: 4/26/2018  Length of Stay: 13 days  Expected Discharge Date:   Attending Physician: Kimberly Sorensen MD  Primary Care Provider:  Tima Schuster MD    SUBJECTIVE:     Principal Problem: Vomiting for 2 days and dysuria  Initial history of present illness: Patient is a 89 y.o. male admitted to Hospitalist Service from [facility] Room with complaint of vomiting for 2 days and dysuria. 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 Part of the history obtained from patient's wife. Patient has been taking PO Keflex for UTI diagnosed 4 days ago.  presents to the ED with vomiting, constipation,frequent urination, decreased urination, decreased appetite with an onset x 1 day PTA. The patient reports that he was recently discharged from the hospital x 5 days ago for a UTI. Patient reports that he has been taking his antibiotics (500mg Keflex)  as directed, but believes they are making him feel badly. He states that he began vomiting yesterday. He denies nausea at this time .  He states that the vomit was clear. Patient states that he has not eaten in two days. He claims that he has not had a bowel movement in 2-3 days. Patient denies cough, swelling,hematuria, hematemesis, bright red blood in stool, or any other symptoms at this time. Family reports that they are concerned that the patient may have diabetes. They report that the patient has not seen his PCP since August 2017. No bed sores reported. Patient denied chest pain, shortness of breath, headache, vision changes, focal neuro-deficits, cough or fever.    PMH/PSH/SH/FH/Meds: reviewed.    Symptoms/Review of Systems: In ICU with respiratory failure and aspiration pneumonia. s/p exploratory laparotomy, adhesiolysis with  extensive adhesions with release of SBO. Patient is sedated and on ventilator.   Diet:  TPN + Lipids. NPO  Activity level: Quadriplegic  Pain:  Patient reports no pain.       OBJECTIVE:   Vital Signs (Most Recent):      Temp: 96.8 °F (36 °C) (05/10/18 0716)  Pulse: 65 (05/10/18 0716)  Resp: 20 (05/10/18 0716)  BP: (!) 108/56 (05/10/18 0600)  SpO2: 96 % (05/10/18 0716)       Vital Signs Range (Last 24H):  Temp:  [96.3 °F (35.7 °C)-97.6 °F (36.4 °C)]   Pulse:  [52-79]   Resp:  [17-24]   BP: ()/(49-86)   SpO2:  [92 %-100 %]   Arterial Line BP: ()/()     Weight: 89.6 kg (197 lb 8.5 oz)  Body mass index is 26.79 kg/m².    Intake/Output Summary (Last 24 hours) at 05/10/18 0827  Last data filed at 05/10/18 0600   Gross per 24 hour   Intake          2197.75 ml   Output             2795 ml   Net          -597.25 ml     Physical Examination:  General appearance: well developed, appears stated age, on ventilator  Head: normocephalic, atraumatic  Eyes:  conjunctivae/corneas clear. PERRL.  Nose: Nares normal. Septum midline.  Throat: lips, mucosa, and tongue normal; teeth and gums normal, no throat erythema.  Neck: supple, symmetrical, trachea midline, no JVD and thyroid not enlarged, symmetric, no tenderness/mass/nodules  Lungs:  clear to auscultation bilaterally and normal respiratory effort  Chest wall: no tenderness  Heart: regular rate and rhythm, S1, S2 normal, no murmur, click, rub or gallop  Abdomen: soft, non-tender non-distented; bowel sounds hypoactive; no masses,  no organomegaly. Abdominal dressings C/D/I.  Extremities: no cyanosis, clubbing. 2+ edema.   Pulses: 2+ and symmetric  Skin: Skin color, texture, turgor normal. No rashes or lesions.  Lymph nodes: Cervical, supraclavicular, and axillary nodes normal.  Neurologic: sedated    CBC:    Recent Labs  Lab 05/08/18  0309 05/09/18  0305 05/10/18  0321   WBC 22.40* 15.10* 8.90   RBC 3.27* 3.22* 3.28*   HGB 10.2* 9.9* 10.0*   HCT 30.9* 30.7* 30.6*    * 105* 40*   MCV 95 96 93   MCH 31.3* 30.8 30.6   MCHC 33.0 32.2 32.8   BMP    Recent Labs  Lab 05/08/18  0309 05/09/18  0305 05/10/18  0321    114* 84   * 126* 129*   K 4.0 3.8 3.9   CL 95 95 97   CO2 26 26 28   BUN 21 17 16   CREATININE 0.8 0.7 0.6   CALCIUM 8.1* 8.0* 8.1*   MG 1.8 1.8 1.7      Diagnostic Results:  Microbiology Results (last 7 days)     Procedure Component Value Units Date/Time    Blood culture [[id number]] Collected:  05/06/18 1440    Order Status:  Completed Specimen:  Blood from Line, Arterial Updated:  05/10/18 0612     Blood Culture, Routine No Growth to date     Blood Culture, Routine No Growth to date     Blood Culture, Routine No Growth to date     Blood Culture, Routine No Growth to date    Blood culture [[id number]] Collected:  05/06/18 1440    Order Status:  Completed Specimen:  Blood from Antecubital, Left Updated:  05/10/18 0612     Blood Culture, Routine No Growth to date     Blood Culture, Routine No Growth to date     Blood Culture, Routine No Growth to date     Blood Culture, Routine No Growth to date    Culture, Respiratory with Gram Stain [[id number]] Collected:  05/06/18 1809    Order Status:  Completed Specimen:  Respiratory from Sputum, Expectorated Updated:  05/09/18 1137     Respiratory Culture Normal respiratory jaleel     Gram Stain (Respiratory) <10 epithelial cells per low power field.     Gram Stain (Respiratory) Rare WBC's     Gram Stain (Respiratory) No organisms seen    Clostridium difficile EIA [[id number]]     Order Status:  No result Specimen:  Stool from Stool          CT abdomen and pelvis with contrast: Probable adynamic ileus with all the small bowel loops containing fluid but without marked distention seen.  The colon contains stool but is not distended.  Severe prostate hypertrophy.  Bilateral small pleural effusions right greater than left and trace ascites around the liver.    KUB: Appropriately positioned nasogastric tube.  Improved  bowel gas pattern with no distinct loops of dilated small bowel evident on this exam.    KUB: NG tube removed and interval increase in the air-filled loops of bowel suggesting ileus.    CXR: The ET tube tip projects over the mid trachea. 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    CXR: Interval increase in alveolar opacity in the left lung consistent with atelectasis given the rapid interval change.  Overall, there has otherwise been an interval improvement in aeration of each lung noting that persistent severe pulmonary disease persists.  Differential considerations include pneumonia, pulmonary edema and ARDS.    LUE venous doppler: No evidence for left upper extremity venous thrombosis.    CT abdomen and pelvis: No evidence for intra-abdominal postoperative complication.  Lung findings compatible with multifocal pneumonia.  Small bilateral pleural effusions. Prostatomegaly, with evidence for chronic bladder outlet obstruction.  A Gonzales catheter is present within the urinary bladder however the urinary bladder remains moderately distended.  New minimal bilateral hydronephrosis is nonspecific however may be attributable to bladder outlet obstruction. Anasarca.    CXR: Appropriate position of support devices. Diffuse bilateral interstitial and airspace infiltrates may represent pneumonia, edema, or ARDS.  No significant change.    CTA chest: No evidence for pulmonary thromboembolic disease. Multifocal pneumonia. Moderate bilateral pleural effusions with compressive lower lobe atelectasis. 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    CXR: Stable radiographic appearance to the chest.  Findings most consistent with multilobar pneumonia, most pronounced in the left lung base.    KUB: Nonobstructive bowel gas pattern with appropriately positioned NG tube.    CXR: Multifocal pneumonia, left  greater than right, and mild worsening on the left. Stable positioning of support devices.  No pneumothorax.    Assessment/Plan:      Aspiration Pneumonia - multifocal pneumonia  Acute Hypoxic respiratory failure  ARDS  Continue mechanical ventilator, follow pulmonary recommendations.  Continue beta 2 agonist bronchodilator treatments.   Continue IV antibiotics -Vancomycin, Cipro and Zosyn.  Check sputum GS and Cx.   Continue routine medications as before.   CXR without much improvement especially left lung is with significant pneumonia.    Small Bowel Obstruction s/p exploratory laparotomy, adhesiolysis with extensive adhesions with release of SBO.  Post-op Ileus  Continue to follow Dr. Peña's recommendations. NPO.  Needs aggressive incentive spirometry.  Follow hemoglobin and hematocrit closely.  Pain control with PO narcotics and antiemetics as needed.      Yes    Malnutrition of moderate degree [E44.0]  Diet supplementation to be given once PO intake starts. Will encourage PO and monitor closely for weight changes.   Wean off TPN as it is contributing to hyponatremia and start enteral tube feeding after KUB is non-obstructive.     Yes    Hepatitis C [B19.20]  Trend LFTs.     UTI  Urine C/S - NTD. Continue IV Cipro.     Hypophosphatemia  Replete phosphorus, follow level.    Hyponatremia - likely related to TPN use  Closely follow BMP.     Yes    CAD (coronary artery disease) [I25.10]  Patient with known CAD and monitor for S/Sx of angina/ACS. Continue to monitor on telemetry.      Yes    History of hypertension [Z86.79]  Continue chronic medications and monitor for any changes, adjusting as needed.      Not Applicable    Paraplegia [G82.20]  Supportive care.  Fall precautions.  Skin care.   Yes          DVT prophylaxis: Lovenox 40 mg SQ q day.    I had a family meeting with patient's wife and grand-daughter. I explained the poor prognosis and possible need for tracheostomy. Patient's wife confirmed, she and  her  discussed this scenario and he told her, he would not like to kept on life support if situation comes. She is leaning towrds extubation and comfort but wants to have kids input included. Time spent in care of the patient, counseling and coordination of care (Greater than 50% spent in direct face to face contact): 35 min. Will proceed with DNR per patient's wishes.     Kimberly Sorensen MD  Department of Hospital Medicine   [facility]

## 2018-05-10 NOTE — PLAN OF CARE
I called the pt's wife, Nicole (ph#[phone]), she confirmed password on chart. Dr Sorensen discussed plan of care and prognosis with her.....JOYCE Mitchell       05/10/18 1041   Discharge Reassessment   Assessment Type Discharge Planning Reassessment

## 2018-05-10 NOTE — PROGRESS NOTES
05/10/18 0716   Patient Assessment/Suction   Level of Consciousness (AVPU) responds to voice   All Lung Fields Breath Sounds coarse;equal bilaterally   Rhythm/Pattern, Respiratory ventilator assisted   PRE-TX-O2-ETCO2   O2 Device (Oxygen Therapy) ventilator   Oxygen Concentration (%) 40   SpO2 96 %   ETCO2 (mmHg) 30 mmHg   Pulse 65   Resp 20   Temp 96.8 °F (36 °C)       Airway - Non-Surgical 05/06/18 1759 Endotracheal Tube   Placement Date/Time: 05/06/18 1759   Present Prior to Hospital Arrival?: No  Method of Intubation: Direct laryngoscopy  Inserted by: MD  Airway Device: Endotracheal Tube  Blade: Everton #4  Airway Device Size: 8.0  Style: Cuffed  Cuff Inflation: Min...   Secured at 24 cm   Measured At Lips   Secured Location Left   Secured by Commercial tube avelar   Bite Block left   Status Intact;Secured;Patent   Site Assessment Clean;Dry   Vent Select   Conventional Vent Y   $ Ventilator Subsequent 1   Charged w/in last 24h YES   Preset Conventional Ventilator Settings   Vent Type    Ventilation Type VC   Vent Mode A/C   Humidity HME   Set Rate 20 bmp   Vt Set 460 mL   PEEP/CPAP 10 cmH20   Pressure Support 0 cmH20   Waveform RAMP   Peak Flow 75 L/min   Set Inspiratory Pressure 0 cmH20   Insp Time 0 Sec(s)   Plateau Set/Insp. Hold (sec) 0   Insp Rise Time  0 %   Trigger Sensitivity Flow/I-Trigger 2 L/min   P High 0 cm H2O   P Low 0 cm H2O   T High 0 sec   T Low 0 sec   Patient Ventilator Parameters   Resp Rate Total 20 br/min   Peak Airway Pressure 29 cmH2O   Mean Airway Pressure 15 cmH20   Plateau Pressure 32 cmH20   Exhaled Vt 473 mL   Total Ve 9.45 mL   Spont Ve 0 L   I:E Ratio Measured 1:3.50   Conventional Ventilator Alarms   Alarms On Y   Ve High Alarm 27.5 L/min   Resp Rate High Alarm 40 br/min   Press High Alarm 43 cmH2O   Apnea Rate 10   Apnea Volume (mL) 600 mL   Apnea Oxygen Concentration  100   Apnea Flow Rate (L/min) 70   T Apnea 20 sec(s)   Ready to Wean/Extubation Screen   FIO2<=50  (chart decimal) 0.4   MV<16L (chart vol.) 9.45   PEEP <=8 (chart #) (!) 10   Ready to Wean Parameters   F/VT Ratio<105 (RSBI) (!) 42.28

## 2018-05-10 NOTE — CONSULTS
Yamil Santiago . [id number] is a 89 y.o. male who has been consulted for vancomycin dosing.    Vancomycin trough will  5/11/18 at 2330.      Patient will be followed by pharmacy for changes in renal function, toxicity, and efficacy.    Thank you for allowing us to participate in this patient's care.     Inderjit Short, Pharmacist

## 2018-05-10 NOTE — PROGRESS NOTES
Progress Note  PULMONARY    Admit Date: 4/26/2018   05/10/2018      SUBJECTIVE:     May 7, not arousing, sedate on vent and warming blanket.  May 8, partial eye movement with tactile stimuli  May9,  Arouses slight  May 10 , bitting et tube with some arousalbility.    PFSH and Allergies reviewed.    OBJECTIVE:     Vitals (Most recent):  Vitals:    05/10/18 0600   BP: (!) 108/56   Pulse: 67   Resp: 20   Temp: 96.4 °F (35.8 °C)       Vitals (24 hour range):  Temp:  [96.3 °F (35.7 °C)-97.6 °F (36.4 °C)]   Pulse:  [52-79]   Resp:  [17-24]   BP: ()/(49-86)   SpO2:  [92 %-100 %]   Arterial Line BP: ()/()       Intake/Output Summary (Last 24 hours) at 05/10/18 0814  Last data filed at 05/10/18 0600   Gross per 24 hour   Intake          2197.75 ml   Output             2795 ml   Net          -597.25 ml          Physical Exam:  The patient's neuro status (alertness,orientation,cognitive function,motor skills,), pharyngeal exam (oral lesions, hygiene, abn dentition,), Neck (jvd,mass,thyroid,nodes in neck and above/below clavicle),RESPIRATORY(symmetry,effort,fremitus,percussion,auscultation),  Cor(rhythm,heart tones including gallops,perfusion,edema)ABD(distention,hepatic&splenomegaly,tenderness,masses), Skin(rash,cyanosis),Psyc(affect,judgement,).  Exam negative except for these pertinent findings:    Intubated, bitting tube distress, chest is symmetric, no distress, normal percussion, normal fremitus and bronchial pattern noted anterior, no edema, arousing.      Radiographs reviewed: view by direct vision  Right lower lung near clear, left with persistent effusion and atelectasis      Results for orders placed during the hospital encounter of 04/26/18   X-Ray Chest 1 View    Narrative EXAMINATION:  XR CHEST 1 VIEW    CLINICAL HISTORY:  et tube placement;    TECHNIQUE:  Single, AP chest radiograph.    COMPARISON:  05/06/2018, 6:48 a.m.    FINDINGS:  ET tube tip projects over the mid trachea.  Right IJ CVL tip  projects over the upper SVC.  An enteric tube courses into the abdomen, its tip not included on the study.  Transverse cardiac diameter appears within normal limits.  Severe mixed alveolar and interstitial opacities are again noted in each lung.  Small pleural effusions are noted.  No radiographically apparent pneumothorax.  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      Impression The ET tube tip projects over the mid trachea.    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    The preliminary and final reports are concordant.      Electronically signed by: Adelita Ortega MD  Date:    05/07/2018  Time:    07:25   ]    Labs       Recent Labs  Lab 05/10/18  0321   WBC 8.90   HGB 10.0*   HCT 30.6*   PLT 40*       Recent Labs  Lab 05/10/18  0321   *   K 3.9   CL 97   CO2 28   BUN 16   CREATININE 0.6   GLU 84   CALCIUM 8.1*   MG 1.7   PHOS 3.1   AST 32   ALT 21   ALKPHOS 58   BILITOT 1.2*   PROT 4.9*   ALBUMIN 2.0*       Recent Labs  Lab 05/09/18  0824   PH 7.324*   PCO2 53.1*   PO2 113*   HCO3 27.6     Microbiology Results (last 7 days)     Procedure Component Value Units Date/Time    Blood culture [[id number]] Collected:  05/06/18 1440    Order Status:  Completed Specimen:  Blood from Line, Arterial Updated:  05/10/18 0612     Blood Culture, Routine No Growth to date     Blood Culture, Routine No Growth to date     Blood Culture, Routine No Growth to date     Blood Culture, Routine No Growth to date    Blood culture [[id number]] Collected:  05/06/18 1440    Order Status:  Completed Specimen:  Blood from Antecubital, Left Updated:  05/10/18 0612     Blood Culture, Routine No Growth to date     Blood Culture, Routine No Growth to date     Blood Culture, Routine No Growth to date     Blood Culture, Routine No Growth to date    Culture, Respiratory with Gram Stain [[id number]] Collected:  05/06/18 6726    Order Status:  Completed  Specimen:  Respiratory from Sputum, Expectorated Updated:  05/09/18 1137     Respiratory Culture Normal respiratory jaleel     Gram Stain (Respiratory) <10 epithelial cells per low power field.     Gram Stain (Respiratory) Rare WBC's     Gram Stain (Respiratory) No organisms seen    Clostridium difficile EIA [[id number]]     Order Status:  No result Specimen:  Stool from Stool           Impression:  Active Hospital Problems    Diagnosis  POA    Acute hypoxemic respiratory failure [J96.01]  Clinically Undetermined    ARDS (adult respiratory distress syndrome) [J80]  Yes    Pneumonia due to infectious organism [J18.9]  Yes    SBO (small bowel obstruction) [K56.609]  Yes    Non-intractable vomiting with nausea [R11.2]  Yes    Malnutrition of moderate degree [E44.0]  Yes    Hepatitis C [B19.20]  Yes    CAD (coronary artery disease) [I25.10]  Yes    History of hypertension [Z86.79]  Not Applicable    Paraplegia [G82.20]  Yes      Resolved Hospital Problems    Diagnosis Date Resolved POA   No resolved problems to display.       Plan:   May 7, still septic, and ards moderate by [place].  At 6 ft. Needs tv 466 for 6 cc/kg.  Continue support - was very debilitated prior c4 paraplegic,and hard to evaluate but expect poor recovery.  Cont abx for pneumonia etc...  May 8, 2018 wbc still up, cxr better with clearer right lung.  Sputum nl jaleel. Cut peep and rate, wean soon ?    May 9, arouses slight, seems better but marginal with wean and extubation given abg's on full support.  May need some lasix - dose 20 x 1.    May  10 , bitting et tube  Needing more sedation.  Left lung awful and diffuse rhonchi.    Suspect trach and min sedation would be best option.  Needs to mobilize if recovery is goal.  Expect may be too debilitated to regain function - at least for severe months.  plt low - jehovah witness.   [place] is dismal.                                .

## 2018-05-11 PROBLEM — J90 PLEURAL EFFUSION: Status: ACTIVE | Noted: 2018-01-01

## 2018-05-11 PROBLEM — R60.1 ANASARCA: Status: ACTIVE | Noted: 2018-01-01

## 2018-05-11 PROBLEM — Z53.1 REFUSAL OF BLOOD TRANSFUSIONS AS PATIENT IS JEHOVAH'S WITNESS: Status: ACTIVE | Noted: 2018-01-01

## 2018-05-11 PROBLEM — G93.41 ENCEPHALOPATHY, METABOLIC: Status: ACTIVE | Noted: 2018-01-01

## 2018-05-11 NOTE — PROGRESS NOTES
Progress Note  Hospital Medicine  Patient Name:Yamil Santiago Jr.  MRN:  [id number]  Patient Class: IP- Inpatient  Admit Date: 4/26/2018  Length of Stay: 14 days  Expected Discharge Date:   Attending Physician: Kimberly Sorensen MD  Primary Care Provider:  Tima Schuster MD    SUBJECTIVE:     Principal Problem: Vomiting for 2 days and dysuria  Initial history of present illness: Patient is a 89 y.o. male admitted to Hospitalist Service from [facility] Emergency Room with complaint of vomiting for 2 days and dysuria. 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 Part of the history obtained from patient's wife. Patient has been taking PO Keflex for UTI diagnosed 4 days ago.  presents to the ED with vomiting, constipation,frequent urination, decreased urination, decreased appetite with an onset x 1 day PTA. The patient reports that he was recently discharged from the hospital x 5 days ago for a UTI. Patient reports that he has been taking his antibiotics (500mg Keflex)  as directed, but believes they are making him feel badly. He states that he began vomiting yesterday. He denies nausea at this time .  He states that the vomit was clear. Patient states that he has not eaten in two days. He claims that he has not had a bowel movement in 2-3 days. Patient denies cough, swelling,hematuria, hematemesis, bright red blood in stool, or any other symptoms at this time. Family reports that they are concerned that the patient may have diabetes. They report that the patient has not seen his PCP since August 2017. No bed sores reported. Patient denied chest pain, shortness of breath, headache, vision changes, focal neuro-deficits, cough or fever.    PMH/PSH/SH/FH/Meds: reviewed.    Symptoms/Review of Systems: In ICU with respiratory failure and aspiration pneumonia. s/p exploratory laparotomy, adhesiolysis with  extensive adhesions with release of SBO. Patient is sedated and on ventilator. Appears to have ansarca.    Diet:  TPN + Lipids. NPO  Activity level: Quadriplegic  Pain:  Patient reports no pain.       OBJECTIVE:   Vital Signs (Most Recent):      Temp: (!) 95.2 °F (35.1 °C) (05/11/18 0724)  Pulse: 79 (05/11/18 0800)  Resp: (!) 24 (05/11/18 0800)  BP: (!) 186/94 (05/11/18 0800)  SpO2: 96 % (05/11/18 0800)       Vital Signs Range (Last 24H):  Temp:  [95.2 °F (35.1 °C)-99.9 °F (37.7 °C)]   Pulse:  [62-89]   Resp:  [20-34]   BP: ()/(53-94)   SpO2:  [92 %-97 %]   Arterial Line BP: ()/(31-76)     Weight: 89.6 kg (197 lb 8.5 oz)  Body mass index is 26.79 kg/m².    Intake/Output Summary (Last 24 hours) at 05/11/18 0847  Last data filed at 05/11/18 0410   Gross per 24 hour   Intake             1709 ml   Output             1015 ml   Net              694 ml     Physical Examination:  General appearance: well developed, appears stated age, on ventilator  Head: normocephalic, atraumatic  Eyes:  conjunctivae/corneas clear. PERRL.  Nose: Nares normal. Septum midline.  Throat: lips, mucosa, and tongue normal; teeth and gums normal, no throat erythema.  Neck: supple, symmetrical, trachea midline, no JVD and thyroid not enlarged, symmetric, no tenderness/mass/nodules  Lungs:  clear to auscultation bilaterally and normal respiratory effort  Chest wall: no tenderness  Heart: regular rate and rhythm, S1, S2 normal, no murmur, click, rub or gallop  Abdomen: soft, non-tender non-distented; bowel sounds hypoactive; no masses,  no organomegaly. Abdominal dressings C/D/I.  Extremities: no cyanosis, clubbing. 2+ edema.   Pulses: 2+ and symmetric  Skin: Skin color, texture, turgor normal. No rashes or lesions.  Lymph nodes: Cervical, supraclavicular, and axillary nodes normal.  Neurologic: sedated    CBC:    Recent Labs  Lab 05/09/18  0305 05/10/18  0321 05/11/18  0330   WBC 15.10* 8.90 10.30   RBC 3.22* 3.28* 3.19*   HGB 9.9* 10.0*  10.0*   HCT 30.7* 30.6* 29.8*   * 40* 32*   MCV 96 93 93   MCH 30.8 30.6 31.2*   MCHC 32.2 32.8 33.5   BMP    Recent Labs  Lab 05/09/18  0305 05/10/18  0321 05/11/18  0330   * 84 111*   * 129* 130*   K 3.8 3.9 4.1   CL 95 97 98   CO2 26 28 28   BUN 17 16 17   CREATININE 0.7 0.6 0.7   CALCIUM 8.0* 8.1* 8.1*   MG 1.8 1.7 1.7      Diagnostic Results:  Microbiology Results (last 7 days)     Procedure Component Value Units Date/Time    Blood culture [[id number]] Collected:  05/06/18 1440    Order Status:  Completed Specimen:  Blood from Antecubital, Left Updated:  05/11/18 0612     Blood Culture, Routine No Growth to date     Blood Culture, Routine No Growth to date     Blood Culture, Routine No Growth to date     Blood Culture, Routine No Growth to date     Blood Culture, Routine No Growth to date    Blood culture [[id number]] Collected:  05/06/18 1440    Order Status:  Completed Specimen:  Blood from Line, Arterial Updated:  05/11/18 0612     Blood Culture, Routine No Growth to date     Blood Culture, Routine No Growth to date     Blood Culture, Routine No Growth to date     Blood Culture, Routine No Growth to date     Blood Culture, Routine No Growth to date    Culture, Respiratory with Gram Stain [[id number]] Collected:  05/06/18 1809    Order Status:  Completed Specimen:  Respiratory from Sputum, Expectorated Updated:  05/09/18 1137     Respiratory Culture Normal respiratory jaleel     Gram Stain (Respiratory) <10 epithelial cells per low power field.     Gram Stain (Respiratory) Rare WBC's     Gram Stain (Respiratory) No organisms seen    Clostridium difficile EIA [[id number]]     Order Status:  No result Specimen:  Stool from Stool          CT abdomen and pelvis with contrast: Probable adynamic ileus with all the small bowel loops containing fluid but without marked distention seen.  The colon contains stool but is not distended.  Severe prostate hypertrophy.  Bilateral small pleural effusions right  greater than left and trace ascites around the liver.    KUB: Appropriately positioned nasogastric tube.  Improved bowel gas pattern with no distinct loops of dilated small bowel evident on this exam.    KUB: NG tube removed and interval increase in the air-filled loops of bowel suggesting ileus.    CXR: The ET tube tip projects over the mid trachea. 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    CXR: Interval increase in alveolar opacity in the left lung consistent with atelectasis given the rapid interval change.  Overall, there has otherwise been an interval improvement in aeration of each lung noting that persistent severe pulmonary disease persists.  Differential considerations include pneumonia, pulmonary edema and ARDS.    LUE venous doppler: No evidence for left upper extremity venous thrombosis.    CT abdomen and pelvis: No evidence for intra-abdominal postoperative complication.  Lung findings compatible with multifocal pneumonia.  Small bilateral pleural effusions. Prostatomegaly, with evidence for chronic bladder outlet obstruction.  A Gonzales catheter is present within the urinary bladder however the urinary bladder remains moderately distended.  New minimal bilateral hydronephrosis is nonspecific however may be attributable to bladder outlet obstruction. Anasarca.    CXR: Appropriate position of support devices. Diffuse bilateral interstitial and airspace infiltrates may represent pneumonia, edema, or ARDS.  No significant change.    CTA chest: No evidence for pulmonary thromboembolic disease. Multifocal pneumonia. Moderate bilateral pleural effusions with compressive lower lobe atelectasis. 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    CXR: Stable radiographic appearance to the chest.  Findings most consistent with multilobar pneumonia, most pronounced in the left lung  base.    KUB: Nonobstructive bowel gas pattern with appropriately positioned NG tube.    CXR: Multifocal pneumonia, left greater than right, and mild worsening on the left. Stable positioning of support devices.  No pneumothorax.    CXR: Large left pleural effusion increased since prior studies with severe atelectasis of the left lung.  Cardiomegaly.  Tubes in good position.    Assessment/Plan:      Aspiration Pneumonia - multifocal pneumonia  Acute Hypoxic respiratory failure  ARDS  Continue mechanical ventilator, follow pulmonary recommendations.  Continue beta 2 agonist bronchodilator treatments.   Continue IV antibiotics -Vancomycin, Cipro and Zosyn.  Check sputum GS and Cx.   Continue routine medications as before.   CXR without much improvement especially left lung is with significant pneumonia.  Give IV Lasix with albumin.    Small Bowel Obstruction s/p exploratory laparotomy, adhesiolysis with extensive adhesions with release of SBO.  Post-op Ileus  Continue to follow Dr. Peña's recommendations. NPO.  Needs aggressive incentive spirometry.  Follow hemoglobin and hematocrit closely.  Pain control with PO narcotics and antiemetics as needed.      Yes    Malnutrition of moderate degree [E44.0]  Diet supplementation to be given once PO intake starts. Will encourage PO and monitor closely for weight changes.   Wean off TPN as it is contributing to hyponatremia and start enteral tube feeding after KUB is non-obstructive.     Yes    Hepatitis C [B19.20]  Trend LFTs.     UTI  Urine C/S - NTD. Continue IV Cipro.     Hypophosphatemia  Replete phosphorus, follow level.    Hyponatremia - likely related to TPN use (better with enteral feeding)  Closely follow BMP.     Yes    CAD (coronary artery disease) [I25.10]  Patient with known CAD and monitor for S/Sx of angina/ACS. Continue to monitor on telemetry.      Yes    History of hypertension [Z86.79]  Continue chronic medications and monitor for any changes,  adjusting as needed.      Not Applicable    Paraplegia [G82.20]  Supportive care.  Fall precautions.  Skin care.   Yes          DVT prophylaxis: Lovenox 40 mg SQ q day.    Awaiting family to arrive this am to discuss further plan of care.     Kimberly Sorensen MD  Department of Hospital Medicine   [facility]

## 2018-05-11 NOTE — PROGRESS NOTES
05/11/18 0724   Patient Assessment/Suction   Level of Consciousness (AVPU) responds to voice   All Lung Fields Breath Sounds coarse   Suction Method endotracheal tube;in-line suction catheter (closed);suction catheter (open)   Sputum Amount large   Sputum Color red-streaked;tan   Sputum Consistency thick   PRE-TX-O2-ETCO2   O2 Device (Oxygen Therapy) ventilator   $ Is the patient on Low Flow Oxygen? Yes   Oxygen Concentration (%) 40   SpO2 (!) 92 %   Pulse Oximetry Type Continuous   $ Pulse Oximetry - Multiple Charge Pulse Oximetry - Multiple   ETCO2 (mmHg) 24 mmHg   $ ETCO2 Charge Exhaled CO2 Monitoring   $ ETCO2 Usage Currently wearing   Pulse 62   Resp (!) 31   Temp (!) 95.2 °F (35.1 °C)       Airway - Non-Surgical 05/06/18 1759 Endotracheal Tube   Placement Date/Time: 05/06/18 1759   Present Prior to Hospital Arrival?: No  Method of Intubation: Direct laryngoscopy  Inserted by: MD  Airway Device: Endotracheal Tube  Blade: [place] #4  Airway Device Size: 8.0  Style: Cuffed  Cuff Inflation: Min...   Secured at 24 cm   Measured At Lips   Secured Location Center   Secured by Commercial tube avelar   Bite Block center   Site Condition Dry   Status Intact;Secured;Patent   Site Assessment Clean;Dry   Cuff Pressure 32 cm H2O   Vent Select   Conventional Vent Y   Ventilator Initiated No   $ Ventilator Subsequent 1   Charged w/in last 24h YES   IHI Ventilator Associated Pneumonia Bundle   Daily Awakening Trials Performed No   Head of Bed Elevated  HOB 30   Preset Conventional Ventilator Settings   Vent Type    Ventilation Type VC   Vent Mode A/C   Humidity HME   Set Rate 20 bmp   Vt Set 460 mL   PEEP/CPAP 10 cmH20   Pressure Support 0 cmH20   Waveform RAMP   Peak Flow 75 L/min   Set Inspiratory Pressure 0 cmH20   Insp Time 0 Sec(s)   Plateau Set/Insp. Hold (sec) 0   Insp Rise Time  0 %   Trigger Sensitivity Flow/I-Trigger 2 L/min   P High 0 cm H2O   P Low 0 cm H2O   T High 0 sec   T Low 0 sec   Patient Ventilator  Parameters   Resp Rate Total 32 br/min   Peak Airway Pressure 34 cmH2O   Mean Airway Pressure 19 cmH20   Plateau Pressure 32 cmH20   Exhaled Vt 367 mL   Total Ve 11.7 mL   Spont Ve 0 L   I:E Ratio Measured 1:1.90   Conventional Ventilator Alarms   Ve High Alarm 27.5 L/min   Resp Rate High Alarm 40 br/min   Press High Alarm 43 cmH2O   Apnea Rate 10   Apnea Volume (mL) 600 mL   Apnea Oxygen Concentration  100   Apnea Flow Rate (L/min) 70   T Apnea 20 sec(s)   Ready to Wean/Extubation Screen   FIO2<=50 (chart decimal) 0.4   MV<16L (chart vol.) 11.7   PEEP <=8 (chart #) (!) 10   Ready to Wean Parameters   F/VT Ratio<105 (RSBI) (!) 84.47     Rec'd pt on vent on documented settings. Alarms on & functioning. Ambu bag @ HOB

## 2018-05-11 NOTE — PLAN OF CARE
Problem: Nutrition, Enteral (Adult)  Intervention: Monitor/Manage Nutrition Support  Recommendations    1)  Enteral: Impact Peptide 1.5 @ 10 mls/hr to progress 10 mls Q 6 hrs to goal 50 mls/hr. Flush with 150 mls Q 4 hrs. Provides 1800 calories, 113 gms protein, 168 gms CHO, 924 mls free water.  Hold if residuals > 250 mls.   Goals: 1) Pt will receive at least 85% EEN within 48 hrs  2) pt will receive at least 85% EEN during admit.   Nutrition Goal Status: 1) progressing/discontinued 2) continues  Communication of RD Recs:  (POC, reviewed with RN)

## 2018-05-11 NOTE — PROGRESS NOTES
[facility]  Adult Nutrition  Progress Note    SUMMARY       Recommendations    1)  Enteral: Impact Peptide 1.5 @ 10 mls/hr to progress 10 mls Q 6 hrs to goal 50 mls/hr. Flush with 150 mls Q 4 hrs. Provides 1800 calories, 113 gms protein, 168 gms CHO, 924 mls free water.  Hold if residuals > 250 mls.   Goals: 1) Pt will receive at least 85% EEN within 48 hrs  2) pt will receive at least 85% EEN during admit.   Nutrition Goal Status: 1) progressing/discontinued 2) continues  Communication of RD Recs:  (POC, reviewed with RN)    Reason for Assessment    Reason for Assessment: RD follow up  1. Non-intractable vomiting with nausea, unspecified vomiting type    2. Ileus    3. SBO (small bowel obstruction)    4. Coronary artery disease, angina presence unspecified, unspecified vessel or lesion type, unspecified whether native or transplanted heart    5. Chronic hepatitis C without hepatic coma    6. Malnutrition of moderate degree      Past Medical History:   Diagnosis Date    Hepatitis C     Neck injury     Paraplegic spinal paralysis      General Information Comments: Admitted with vomiting x 2 days., constipation. Pt reports . Notes he can eat all of his clear liquids, but it takes a while. <50% estimated energy needs since 4/26/18.    5/7/18: Pt was transferred to ICU over the weekend. Clinimix E 2.75/5 continues. Now on vent. Energy needs adjusted accordingly. Wt gain noted.   5/9/18 Nursing called requesting enteral feed rec as Clinimix E 5/15 @ 40 mls/hr with 20% lipids (250 mls) is being discontinued.    5/11/18 Pt not tolerating enteral feeds. Per report/chart review pt's prognosis is poor.     Nutrition Risk Screen    Nutrition Risk Screen: dysphagia or difficulty swallowing    Nutrition/Diet History    Patient Reported Diet/Restrictions/Preferences: general  Food Preferences: nno intolerances. does not use ONS at home  Do you have any cultural, spiritual, Gnosticism conflicts, given  your current situation?: ebonie's witness: no blood products of any kind  Food Allergies: NKFA    Anthropometrics    Temp: 97.9 °F (36.6 °C)  Height Method: Stated  Height: 6'  Height (inches): 72 in  Weight Method: Bed Scale  Weight: 89.6 kg (197 lb 8.5 oz)  Weight (lb): 197.53 lb  Ideal Body Weight (IBW), Male: 178 lb  % Ideal Body Weight, Male (lb): 104.9 lb  BMI (Calculated): 25.4  Usual Body Weight (UBW), k.9 kg (per chart review 18)  % Usual Body Weight: 101.16  % Weight Change From Usual Weight: 0.95 %       Lab/Procedures/Meds    Pertinent Labs Reviewed: reviewed  Lab Results   Component Value Date    ALBUMIN 1.8 (L) 2018     No results found for: CRP  Lab Results   Component Value Date    WBC 10.30 2018     Pertinent Medications Reviewed: reviewed  Scheduled Meds:   ciprofloxacin (CIPRO)400mg/200ml D5W IVPB  400 mg Intravenous Q12H    cyanocobalamin  1,000 mcg Per NG tube Daily    docusate sodium  100 mg Oral BID    folic acid  1 mg Per NG tube Daily    furosemide  20 mg Intravenous Q8H    metoclopramide HCl  10 mg Intravenous Q6H    pantoprazole  40 mg Intravenous BID    phytonadione (vitamin K1)  10 mg Per NG tube Daily    piperacillin-tazobactam (ZOSYN) IVPB  3.375 g Intravenous Q6H    polyethylene glycol  17 g Oral BID    sodium phosphate IVPB  30 mmol Intravenous Once    tamsulosin  0.4 mg Oral Daily    vancomycin (VANCOCIN) IVPB  20 mg/kg Intravenous Q18H     Continuous Infusions:   fentanyl 100 mcg/hr (18 1022)    midazolam (VERSED) infusion (titrating) 4 mg/hr (18 1020)    norepinephrine bitartrate-D5W      propofol         Physical Findings/Assessment    Overall Physical Appearance: loss of subcutaneous fat, loss of muscle mass  Oral/Mouth Cavity: WDL  Skin:  (Ammon score 12)   Edema 3+     Estimated/Assessed Needs    Weight Used For Calorie Calculations: 81.6 kg (179 lb 14.3 oz)     Energy Need Method: [place]: 1788 adjusted 2/2 vent    Kcal/kg  25-30 kcal/[id number]     Weight Used For Protein Calculations: 80.7 kg (178 lb)   1.2-1.5 gm/kg/day:      Fluid Need Method: RDA Method     CHO Requirement: n/a      Nutrition Prescription Ordered    Current Diet Order: NPO    Evaluation of Received Nutrient/Fluid Intake    Enteral: Impact Peptide 1.5 @ 30 mls/hr provides 1080 calories 68 gms protein, 106 gms CHO, 554 mls free water  Energy Calories Required: not meeting needs   Protein Required: not meeting needs   Fluid Required: per primary team    Intake/Output Summary (Last 24 hours) at 18 1418  Last data filed at 18 0410   Gross per 24 hour   Intake             1709 ml   Output             1015 ml   Net              694 ml     Tolerance: not tolerating  % Intake of Estimated Energy Needs: 50-75 % Meal Intake: 50-75 %    Nutrition Risk    Level of Risk/Frequency of Follow-up:  (2 x wkly)     Assessment and Plan    Malnutrition of moderate degree    Contributing Nutrition Diagnosis  Inadequate energy intake    Related to (etiology):   Acuity of illness    Signs and Symptoms (as evidenced by):   1) <50% estimated energy needs >5 days  2) Moderate muscle loss noted around clavicle  3) Moderate fat loss noted in upper arm, orbital  4) Hand , weak    Interventions/Recommendations (treatment strategy):  1) progress to soft/bland diet + Boost Plus with meals (discontinue)  2) If unable to progress, recommend increasing PPN to 125 mls/hr and add lipids VS TPN (disoncontinue)   3) continue enteral feeds    Nutrition Diagnosis Status:   continues                Monitor and Evaluation    Food and Nutrient Intake: energy intake  Food and Nutrient Adminstration: diet order  Anthropometric Measurements: weight, weight change  Biochemical Data, Medical Tests and Procedures: inflammatory profile  Nutrition-Focused Physical Findings: overall appearance, skin     Discharge Plan    Soft/bland with ONS for prn use  RD Follow-up?: Yes

## 2018-05-11 NOTE — PROGRESS NOTES
Progress Note  PULMONARY    Admit Date: 4/26/2018 05/11/2018      SUBJECTIVE:     May 7, not arousing, sedate on vent and warming blanket.  May 8, partial eye movement with tactile stimuli  May9,  Arouses slight  May 10 , bitting et tube with some arousalbility.  May 11, calm now on sedation.       PFSH and Allergies reviewed.    OBJECTIVE:     Vitals (Most recent):  Vitals:    05/11/18 1122   BP:    Pulse: 80   Resp: 20   Temp: 96.6 °F (35.9 °C)       Vitals (24 hour range):  Temp:  [94.8 °F (34.9 °C)-99.9 °F (37.7 °C)]   Pulse:  [62-89]   Resp:  [20-34]   BP: (108-186)/(53-94)   SpO2:  [92 %-96 %]   Arterial Line BP: ()/(42-76)       Intake/Output Summary (Last 24 hours) at 05/11/18 1318  Last data filed at 05/11/18 0410   Gross per 24 hour   Intake             1709 ml   Output             1015 ml   Net              694 ml          Physical Exam:  The patient's neuro status (alertness,orientation,cognitive function,motor skills,), pharyngeal exam (oral lesions, hygiene, abn dentition,), Neck (jvd,mass,thyroid,nodes in neck and above/below clavicle),RESPIRATORY(symmetry,effort,fremitus,percussion,auscultation),  Cor(rhythm,heart tones including gallops,perfusion,edema)ABD(distention,hepatic&splenomegaly,tenderness,masses), Skin(rash,cyanosis),Psyc(affect,judgement,).  Exam negative except for these pertinent findings:    Intubated, no bitting tube distress, chest is symmetric, no distress, normal percussion, normal fremitus and bronchial pattern noted anterior,diffuse lower ext edema, not arousing.      Radiographs reviewed: view by direct vision  Right lower lung near clear, left with persistent large effusion and atelectasis      Results for orders placed during the hospital encounter of 04/26/18   X-Ray Chest 1 View    Narrative EXAMINATION:  XR CHEST 1 VIEW    CLINICAL HISTORY:  et tube placement;    TECHNIQUE:  Single, AP chest radiograph.    COMPARISON:  05/06/2018, 6:48 a.m.    FINDINGS:  ET tube tip  projects over the mid trachea.  Right IJ CVL tip projects over the upper SVC.  An enteric tube courses into the abdomen, its tip not included on the study.  Transverse cardiac diameter appears within normal limits.  Severe mixed alveolar and interstitial opacities are again noted in each lung.  Small pleural effusions are noted.  No radiographically apparent pneumothorax.  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      Impression The ET tube tip projects over the mid trachea.    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    The preliminary and final reports are concordant.      Electronically signed by: Adelita Ortega MD  Date:    05/07/2018  Time:    07:25   ]    Labs       Recent Labs  Lab 05/11/18 0330   WBC 10.30   HGB 10.0*   HCT 29.8*   PLT 32*       Recent Labs  Lab 05/11/18 0330   *   K 4.1   CL 98   CO2 28   BUN 17   CREATININE 0.7   *   CALCIUM 8.1*   MG 1.7   PHOS 2.6*   AST 48*   ALT 26   ALKPHOS 94   BILITOT 1.2*   PROT 4.8*   ALBUMIN 1.8*     No results for input(s): PH, PCO2, PO2, HCO3 in the last 24 hours.  Microbiology Results (last 7 days)     Procedure Component Value Units Date/Time    Blood culture [[id number]] Collected:  05/06/18 1440    Order Status:  Completed Specimen:  Blood from Antecubital, Left Updated:  05/11/18 0612     Blood Culture, Routine No Growth to date     Blood Culture, Routine No Growth to date     Blood Culture, Routine No Growth to date     Blood Culture, Routine No Growth to date     Blood Culture, Routine No Growth to date    Blood culture [[id number]] Collected:  05/06/18 1440    Order Status:  Completed Specimen:  Blood from Line, Arterial Updated:  05/11/18 0612     Blood Culture, Routine No Growth to date     Blood Culture, Routine No Growth to date     Blood Culture, Routine No Growth to date     Blood Culture, Routine No Growth to date     Blood Culture, Routine No  Growth to date    Culture, Respiratory with Gram Stain [[id number]] Collected:  05/06/18 1809    Order Status:  Completed Specimen:  Respiratory from Sputum, Expectorated Updated:  05/09/18 1137     Respiratory Culture Normal respiratory jaleel     Gram Stain (Respiratory) <10 epithelial cells per low power field.     Gram Stain (Respiratory) Rare WBC's     Gram Stain (Respiratory) No organisms seen    Clostridium difficile EIA [[id number]]     Order Status:  No result Specimen:  Stool from Stool           Impression:  Active Hospital Problems    Diagnosis  POA    Pleural effusion [J90]  Clinically Undetermined    Encephalopathy, metabolic [G93.41]  Yes    Anasarca [R60.1]  No    Refusal of blood transfusions as patient is Zoroastrian [Z53.1]  Not Applicable    Acute hypoxemic respiratory failure [J96.01]  Clinically Undetermined    ARDS (adult respiratory distress syndrome) [J80]  Yes    Pneumonia due to infectious organism [J18.9]  Yes    SBO (small bowel obstruction) [K56.609]  Yes    Non-intractable vomiting with nausea [R11.2]  Yes    Malnutrition of moderate degree [E44.0]  Yes    Hepatitis C [B19.20]  Yes    CAD (coronary artery disease) [I25.10]  Yes    History of hypertension [Z86.79]  Not Applicable    Paraplegia [G82.20]  Yes      Resolved Hospital Problems    Diagnosis Date Resolved POA   No resolved problems to display.       Plan:   May 7, still septic, and ards moderate by [place].  At 6 ft. Needs tv 466 for 6 cc/kg.  Continue support - was very debilitated prior c4 paraplegic,and hard to evaluate but expect poor recovery.  Cont abx for pneumonia etc...  May 8, 2018 wbc still up, cxr better with clearer right lung.  Sputum nl jaleel. Cut peep and rate, wean soon ?    May 9, arouses slight, seems better but marginal with wean and extubation given abg's on full support.  May need some lasix - dose 20 x 1.    May  10 , bitting et tube  Needing more sedation.  Left lung awful and  diffuse rhonchi.    Suspect trach and min sedation would be best option.  Needs to mobilize if recovery is goal.  Expect may be too debilitated to regain function - at least for severe months.  plt low - jehovah witness.   [place] is dismal.      May 11, dnr and withdrawal being considered.  Volume overload noted- dose lasix.  Pt tolerated cpap psv at bedside- just short term.  Pt likely will ventilate extubated but hard to figure.  Lasix now.  plt lower.      If somehow gets by extubated- b12, folic, vit k, reasonable.will need to hold feeds to extubate ?    Addendum- discussed with wife and did not get impression that withdrawal of support eminent.  Will do wean, dose lasix, try to min sedation, give b12/folic/vit k.      The following were evaluated and adjusted as needed: mechanical ventilator settings and weaning status, intravenous fluids and nutritional status, sedation and neurologic status, antibiotics, support tubes and access lines and invasive monitoring, acid base balance and oxygenation needs, input and output and renal status and CODE STATUS/OUTLOOK DISCUSSED WITH AVAILABLE NEXT OF  KIN       Critical Care  - THE PATIENT HAS A HIGH PROBABILITY OF IMMINENT OR LIFE THREATENING DETERIORATION.  Over 50%time of encounter was in direct care at bedside.  Time was 30 to 74 minutes required for patient care.  Time needed for all of the above totaled 39 minutes.                    .                       .

## 2018-05-11 NOTE — CONSULTS
Yamil Santiago Jr. [id number] is a 89 y.o. male who has been consulted for vancomycin dosing.    Vancomycin trough has been changed to 5/12 at 2000.      Patient will be followed by pharmacy for changes in renal function, toxicity, and efficacy.    Thank you for allowing us to participate in this patient's care.     Marie Nolasco, PharmD

## 2018-05-11 NOTE — PLAN OF CARE
05/10/18 1927   PRE-TX-O2-ETCO2   Oxygen Concentration (%) 40   SpO2 95 %   ETCO2 (mmHg) 34 mmHg   Pulse 86   Resp 20   Temp 98.6 °F (37 °C)   Vent Select   Conventional Vent Y   Charged w/in last 24h YES   Preset Conventional Ventilator Settings   Vent Type    Ventilation Type VC   Vent Mode A/C   Set Rate 20 bmp   Vt Set 460 mL   PEEP/CPAP 10 cmH20   Pressure Support 0 cmH20   Waveform RAMP   Peak Flow 75 L/min   Set Inspiratory Pressure 0 cmH20   Insp Time 0 Sec(s)   Plateau Set/Insp. Hold (sec) 0   Insp Rise Time  0 %   Trigger Sensitivity Flow/I-Trigger 2 L/min   P High 0 cm H2O   P Low 0 cm H2O   T High 0 sec   T Low 0 sec   Patient Ventilator Parameters   Resp Rate Total 20 br/min   Peak Airway Pressure 31 cmH2O   Mean Airway Pressure 15 cmH20   Plateau Pressure 32 cmH20   Exhaled Vt 465 mL   Total Ve 9.51 mL   Spont Ve 0 L   I:E Ratio Measured 1:3.50   Conventional Ventilator Alarms   Ve High Alarm 27.5 L/min   Resp Rate High Alarm 40 br/min   Press High Alarm 43 cmH2O   Apnea Rate 10   Apnea Volume (mL) 600 mL   Apnea Oxygen Concentration  100   Apnea Flow Rate (L/min) 70   T Apnea 20 sec(s)   Ready to Wean/Extubation Screen   FIO2<=50 (chart decimal) 0.4   MV<16L (chart vol.) 9.51   PEEP <=8 (chart #) (!) 10   Ready to Wean Parameters   F/VT Ratio<105 (RSBI) (!) 43.01

## 2018-05-12 NOTE — PLAN OF CARE
Problem: Patient Care Overview  Goal: Plan of Care Review  Outcome: Unable to achieve outcome(s) by discharge  Heart rate declined until asystole.  All vital signs ceased.  Dr. Rodarte notified.  Dr. Rodarte at bedside and pronounced demise at 1430.  Family at bedside and aware.    [facility] and 's office notified and body released by both.    (Patient is a , but wife does not want the Code Red, White, and Blue, since they are Jehovah Witnesses.)

## 2018-05-12 NOTE — PROGRESS NOTES
Progress Note  Hospital Medicine  Patient Name:Yamil Santiago Jr.  MRN:  [id number]  Patient Class: IP- Inpatient  Admit Date: 4/26/2018  Length of Stay: 15 days  Expected Discharge Date:   Attending Physician: Kimberly Sorensen MD  Primary Care Provider:  Tima Schuster MD    SUBJECTIVE:     Principal Problem: Vomiting for 2 days and dysuria  Initial history of present illness: Patient is a 89 y.o. male admitted to Hospitalist Service from [facility] Emergency Room with complaint of vomiting for 2 days and dysuria. 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 Part of the history obtained from patient's wife. Patient has been taking PO Keflex for UTI diagnosed 4 days ago. He presented to the ED with vomiting, constipation,frequent urination, decreased urination, decreased appetite with an onset x 1 day PTA. He began vomiting the day prior to arrival. Patient denied cough, swelling,hematuria, hematemesis, bright red blood in stool, or any other symptoms at this time. Family reports that the patient has not seen his PCP since August 2017. No bed sores reported. Patient denied chest pain, shortness of breath, headache, vision changes, focal neuro-deficits, cough or fever.    PMH/PSH/SH/FH/Meds: reviewed.    Symptoms/Review of Systems: In ICU with respiratory failure and aspiration pneumonia. s/p exploratory laparotomy, adhesiolysis with extensive adhesions with release of SBO. Weaning efforts have not been successful. Patient unresponsive on my evaluation this morning.     Diet:  TPN + Lipids. NPO  Activity level: Quadriplegic    OBJECTIVE:   Vital Signs (Most Recent):      Temp: 97 °F (36.1 °C) (05/12/18 0900)  Pulse: (!) 57 (05/12/18 0900)  Resp: 20 (05/12/18 0900)  BP: (!) 110/59 (05/12/18 0900)  SpO2: 97 % (05/12/18 0900)       Vital Signs Range (Last 24H):  Temp:  [96.6 °F (35.9 °C)-98.4 °F (36.9  °C)]   Pulse:  [57-84]   Resp:  [20-37]   BP: ()/(52-67)   SpO2:  [92 %-99 %]   Arterial Line BP: ()/(40-56)     Weight: 89.6 kg (197 lb 8.5 oz)  Body mass index is 26.79 kg/m².    Intake/Output Summary (Last 24 hours) at 05/12/18 1111  Last data filed at 05/12/18 0600   Gross per 24 hour   Intake          3007.46 ml   Output             3900 ml   Net          -892.54 ml     Physical Examination:  General appearance: well developed, appears stated age, on ventilator  Head: normocephalic, atraumatic  Eyes: Pupils unresponsive to light.  Nose: Nares normal. Septum midline.  Throat: lips, mucosa, and tongue normal;  Neck: supple, symmetrical, trachea midline, no JVD and thyroid not enlarged, symmetric  Lungs: Occasional rhonchi bilaterally, intubated.  Heart: regular rate and rhythm, S1, S2 normal, no murmur, click, rub or gallop  Abdomen: soft, non-tender non-distented; bowel sounds hypoactive Abdominal dressings C/D/I.  Extremities: no cyanosis, clubbing. 2+ edema.   Skin: Skin color, texture, turgor normal. No rashes or lesions.  Neurologic: sedated, unresponsive.    CBC:    Recent Labs  Lab 05/10/18  0321 05/11/18  0330 05/12/18  0337   WBC 8.90 10.30 9.60   RBC 3.28* 3.19* 3.14*   HGB 10.0* 10.0* 9.8*   HCT 30.6* 29.8* 29.4*   PLT 40* 32* 16*   MCV 93 93 94   MCH 30.6 31.2* 31.1*   MCHC 32.8 33.5 33.2   BMP    Recent Labs  Lab 05/10/18  0321 05/11/18  0330 05/12/18  0337   GLU 84 111* 91   * 130* 132*   K 3.9 4.1 3.5   CL 97 98 98   CO2 28 28 28   BUN 16 17 18   CREATININE 0.6 0.7 0.7   CALCIUM 8.1* 8.1* 7.8*   MG 1.7 1.7 1.6      Diagnostic Results:  Microbiology Results (last 7 days)     Procedure Component Value Units Date/Time    Blood culture [[id number]] Collected:  05/06/18 1440    Order Status:  Completed Specimen:  Blood from Line, Arterial Updated:  05/12/18 0612     Blood Culture, Routine No growth after 5 days.    Blood culture [[id number]] Collected:  05/06/18 1440    Order Status:   Completed Specimen:  Blood from Antecubital, Left Updated:  05/12/18 0612     Blood Culture, Routine No growth after 5 days.    Culture, Respiratory with Gram Stain [[id number]] Collected:  05/06/18 1809    Order Status:  Completed Specimen:  Respiratory from Sputum, Expectorated Updated:  05/09/18 1137     Respiratory Culture Normal respiratory jaleel     Gram Stain (Respiratory) <10 epithelial cells per low power field.     Gram Stain (Respiratory) Rare WBC's     Gram Stain (Respiratory) No organisms seen    Clostridium difficile EIA [[id number]]     Order Status:  No result Specimen:  Stool from Stool          CT abdomen and pelvis with contrast: Probable adynamic ileus with all the small bowel loops containing fluid but without marked distention seen.  The colon contains stool but is not distended.  Severe prostate hypertrophy.  Bilateral small pleural effusions right greater than left and trace ascites around the liver.    KUB: Appropriately positioned nasogastric tube.  Improved bowel gas pattern with no distinct loops of dilated small bowel evident on this exam.    KUB: NG tube removed and interval increase in the air-filled loops of bowel suggesting ileus.    CXR: The ET tube tip projects over the mid trachea. 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    CXR: Interval increase in alveolar opacity in the left lung consistent with atelectasis given the rapid interval change.  Overall, there has otherwise been an interval improvement in aeration of each lung noting that persistent severe pulmonary disease persists.  Differential considerations include pneumonia, pulmonary edema and ARDS.    LUE venous doppler: No evidence for left upper extremity venous thrombosis.    CT abdomen and pelvis: No evidence for intra-abdominal postoperative complication.  Lung findings compatible with multifocal pneumonia.  Small bilateral pleural effusions.  Prostatomegaly, with evidence for chronic bladder outlet obstruction.  A Gonzales catheter is present within the urinary bladder however the urinary bladder remains moderately distended.  New minimal bilateral hydronephrosis is nonspecific however may be attributable to bladder outlet obstruction. Anasarca.    CXR: Appropriate position of support devices. Diffuse bilateral interstitial and airspace infiltrates may represent pneumonia, edema, or ARDS.  No significant change.    CTA chest: No evidence for pulmonary thromboembolic disease. Multifocal pneumonia. Moderate bilateral pleural effusions with compressive lower lobe atelectasis. 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    CXR: Stable radiographic appearance to the chest.  Findings most consistent with multilobar pneumonia, most pronounced in the left lung base.    KUB: Nonobstructive bowel gas pattern with appropriately positioned NG tube.    CXR: Multifocal pneumonia, left greater than right, and mild worsening on the left. Stable positioning of support devices.  No pneumothorax.    CXR: Large left pleural effusion increased since prior studies with severe atelectasis of the left lung.  Cardiomegaly.  Tubes in good position.    Assessment/Plan:      Aspiration Pneumonia - multifocal pneumonia  Acute Hypoxic respiratory failure  ARDS  After a discussion with the family, the patient was made DNR. Weaning efforts have been unsuccessful. I spoke to the family today on two separate occasions. Wife did not want a tracheostomy and currently given the low platelets it carries prohibitive risk. The plan is to extubate the patient and to have him on comfort care if his respiratory status is to deteriorate while extubated. Will give an additional 40 mg of Furosemide I/v prior to extubation. Dr Lucas aware of plan and his input is appreciated.    Small Bowel Obstruction s/p exploratory laparotomy, adhesiolysis with  extensive adhesions with release of SBO.  Post-op Ileus  - Symptomatic management for now.      Yes    Malnutrition of moderate degree [E44.0]  Chronic issue. Has been on TPN.     Yes    Hepatitis C [B19.20]  Trend LFTs.     UTI  Urine C/S - NTD. Continue IV Cipro.     Hypophosphatemia  Replete phosphorus, follow level.    Hyponatremia - likely related to TPN use (better with enteral feeding)  Closely follow BMP.     Yes    CAD (coronary artery disease) [I25.10]  Patient with known CAD and monitor for S/Sx of angina/ACS. Continue to monitor on telemetry.      Yes    History of hypertension [Z86.79]  Continue chronic medications and monitor for any changes, adjusting as needed.      Not Applicable    Paraplegia [G82.20]  Supportive care.  Fall precautions.  Skin care.   Yes        Thrombocytopenia - Platelets down to 16 today. Patient has not been transfused as the patient is a [employer].     Plan discussed with the patient's wife and daughter on separate occasions and they are in agreement.    Bib Rodarte MD  Department of Hospital Medicine   [facility]

## 2018-05-12 NOTE — PLAN OF CARE
05/11/18 1944   Patient Assessment/Suction   Level of Consciousness (AVPU) unresponsive   Respiratory Effort Normal;Unlabored   Expansion/Accessory Muscles/Retractions expansion symmetric;no retractions;no use of accessory muscles   VICENTA Breath Sounds clear   LLL Breath Sounds clear   RUL Breath Sounds diminished   RML Breath Sounds diminished   RLL Breath Sounds diminished   Suction Method (RN suctioned)   PRE-TX-O2-ETCO2   O2 Device (Oxygen Therapy) ventilator   Oxygen Concentration (%) 40   SpO2 (!) 94 %   Pulse Oximetry Type Continuous   ETCO2 (mmHg) 23 mmHg   Pulse 67   Resp (!) 34   Temp 97.5 °F (36.4 °C)   Aerosol Therapy   $ Aerosol Therapy Charges Aerosol Treatment   Respiratory Treatment Status given   SVN/Inhaler Treatment Route in-line;with oxygen   Position During Treatment HOB at 45 degrees   Patient Tolerance good   Post-Treatment   Post-treatment Heart Rate (beats/min) 67   Post-treatment Resp Rate (breaths/min) 20   All Fields Breath Sounds unchanged   Vent Select   Conventional Vent Y   Charged w/in last 24h YES   Preset Conventional Ventilator Settings   Vent Type    Ventilation Type VC   Vent Mode A/C   Humidity HME   Set Rate 20 bmp   Vt Set 460 mL   PEEP/CPAP 10 cmH20   Pressure Support 0 cmH20   Waveform RAMP   Peak Flow 75 L/min   Set Inspiratory Pressure 0 cmH20   Insp Time 0 Sec(s)   Plateau Set/Insp. Hold (sec) 0   Insp Rise Time  0 %   Trigger Sensitivity Flow/I-Trigger 2 L/min   P High 0 cm H2O   P Low 0 cm H2O   T High 0 sec   T Low 0 sec   Patient Ventilator Parameters   Resp Rate Total 33 br/min   Peak Airway Pressure 29 cmH2O   Mean Airway Pressure 17 cmH20   Plateau Pressure 32 cmH20   Exhaled Vt 328 mL   Total Ve 10.5 mL   Spont Ve 0 L   I:E Ratio Measured 1:1.70   Conventional Ventilator Alarms   Ve High Alarm 27.5 L/min   Resp Rate High Alarm 40 br/min   Press High Alarm 43 cmH2O   Apnea Rate 10   Apnea Volume (mL) 600 mL   Apnea Oxygen Concentration  100   Apnea Flow Rate  (L/min) 70   T Apnea 20 sec(s)   Ready to Wean/Extubation Screen   FIO2<=50 (chart decimal) 0.4   MV<16L (chart vol.) 10.5   PEEP <=8 (chart #) (!) 10   Ready to Wean Parameters   F/VT Ratio<105 (RSBI) (!) 103.66

## 2018-05-12 NOTE — PLAN OF CARE
Problem: Patient Care Overview  Goal: Plan of Care Review  Outcome: Revised  Extubated at 1200.  Placed on NC.  VS sustaining for now.  Family at bedside.  Monitor off in room, monitor at nurses' station observed.

## 2018-05-12 NOTE — PLAN OF CARE
Problem: Patient Care Overview  Goal: Plan of Care Review  Outcome: Ongoing (interventions implemented as appropriate)  Pulse rate down into 20's now.  Continue to monitor.

## 2018-05-12 NOTE — PT/OT/SLP DISCHARGE
Occupational Therapy Discharge Summary    Yamil Santiago Jr.  MRN: [id number]   Principal Problem: <principal problem not specified>      Patient Discharged from acute Occupational Therapy on 5/12/2018.  Please refer to prior OT note dated 5/3/2018 for functional status.    Assessment:      Patient transferred to lower level of care secondary to resp distress. Pt is in the ICU.    Objective:     GOALS:    Occupational Therapy Goals        Problem: Occupational Therapy Goal    Goal Priority Disciplines Outcome Interventions   Occupational Therapy Goal     OT, PT/OT Ongoing (interventions implemented as appropriate)    Description:  Goals to be met by: 5/17/2018    Patient will increase functional independence with ADLs by performing:    Feeding with Supervision.  Grooming while seated with Minimal Assistance.  Sitting at edge of bed x10 minutes with Moderate Assistance.  Supine to sit with Maximum Assistance.  Toilet transfer to bedside commode with Maximum Assistance.                      Reasons for Discontinuation of Therapy Services  Pt transferred to ICU.      Plan:     Patient Discharged to: ICU    Nico Fortune, OT  5/12/2018

## 2018-05-12 NOTE — PLAN OF CARE
Problem: Patient Care Overview  Goal: Plan of Care Review  Outcome: Ongoing (interventions implemented as appropriate)  Patient free of falls and injuries this shift. Remains sedated on vent. Over all body swollen with bilateral forearms weeping. Sinus on monitor. Bath given.

## 2018-05-12 NOTE — PROGRESS NOTES
05/12/18 0749   Patient Assessment/Suction   Level of Consciousness (AVPU) unresponsive   All Lung Fields Breath Sounds clear   Suction Method endotracheal tube;in-line suction catheter (closed)   Sputum Amount scant   Sputum Color clear   Sputum Consistency thin   PRE-TX-O2-ETCO2   O2 Device (Oxygen Therapy) ventilator   $ Is the patient on Low Flow Oxygen? Yes   Oxygen Concentration (%) 40   SpO2 98 %   Pulse Oximetry Type Continuous   $ Pulse Oximetry - Multiple Charge Pulse Oximetry - Multiple   ETCO2 (mmHg) 33 mmHg   $ ETCO2 Charge Exhaled CO2 Monitoring   $ ETCO2 Usage Currently wearing   Pulse 60   Resp 20   Temp 97.2 °F (36.2 °C)       Airway - Non-Surgical 05/06/18 1759 Endotracheal Tube   Placement Date/Time: 05/06/18 1759   Present Prior to Hospital Arrival?: No  Method of Intubation: Direct laryngoscopy  Inserted by: MD  Airway Device: Endotracheal Tube  Blade: [place]ton #4  Airway Device Size: 8.0  Style: Cuffed  Cuff Inflation: Min...   Secured at 25 cm   Measured At Lips   Secured Location Center   Secured by Commercial tube avelar   Bite Block none   Site Condition Dry   Status Intact;Secured;Patent   Site Assessment Clean;Dry   Cuff Pressure 30 cm H2O   Vent Select   Conventional Vent Y   Ventilator Initiated No   $ Ventilator Subsequent 1   Charged w/in last 24h YES   IHI Ventilator Associated Pneumonia Bundle   Daily Awakening Trials Performed No   Preset Conventional Ventilator Settings   Vent Type    Ventilation Type VC   Vent Mode A/C   Humidity HME   Set Rate 20 bmp   Vt Set 460 mL   PEEP/CPAP 10 cmH20   Pressure Support 0 cmH20   Waveform RAMP   Peak Flow 65 L/min   Set Inspiratory Pressure 0 cmH20   Insp Time 0 Sec(s)   Plateau Set/Insp. Hold (sec) 0   Insp Rise Time  0 %   Trigger Sensitivity Flow/I-Trigger 2 L/min   P High 0 cm H2O   P Low 0 cm H2O   T High 0 sec   T Low 0 sec   Patient Ventilator Parameters   Resp Rate Total 20 br/min   Peak Airway Pressure 27 cmH2O   Mean Airway  Pressure 15 cmH20   Plateau Pressure 32 cmH20   Exhaled Vt 457 mL   Total Ve 9.17 mL   Spont Ve 0 L   I:E Ratio Measured 1:2.80   Conventional Ventilator Alarms   Ve High Alarm 37.5 L/min   Resp Rate High Alarm 40 br/min   Press High Alarm 43 cmH2O   Apnea Rate 10   Apnea Volume (mL) 600 mL   Apnea Oxygen Concentration  100   Apnea Flow Rate (L/min) 70   T Apnea 20 sec(s)   Ready to Wean/Extubation Screen   FIO2<=50 (chart decimal) 0.4   MV<16L (chart vol.) 9.17   PEEP <=8 (chart #) (!) 10   Ready to Wean Parameters   F/VT Ratio<105 (RSBI) (!) 43.76     Rec'd pt on vent on documented settings. Alarms on & functioning. Ambu bag @ HOB

## 2018-05-12 NOTE — PLAN OF CARE
Problem: Patient Care Overview  Goal: Plan of Care Review  Outcome: Revised  Family members beginning to assemble.  All sedation has been off since 0900, with no change in VS:  Continues with HR in upper 50's, BP continues in same range as it was prior to stopping sedation, not overbreathing the vent, pupils pinpoint and nonreactive, unresponsive.

## 2018-05-13 LAB — PA IGG BLD QL FC: NEGATIVE

## 2018-05-14 NOTE — DISCHARGE SUMMARY
Discharge Summary  Hospital Medicine    Admit Date: 2018    Date and Time: 20185:40 PM    Discharge Attending Physician: Kimberly Sorensen MD    Primary Care Physician: Tima Schuster MD    Diagnoses:  Active Hospital Problems    Diagnosis  POA    Acute hypoxemic respiratory failure [J96.01]  ARDS (adult respiratory distress syndrome) [J80]  Pneumonia due to infectious organism [J18.9]  Pleural effusion [J90]  Yes    Encephalopathy, metabolic [G93.41]  Yes    Anasarca [R60.1]  No    Refusal of blood transfusions as patient is Muslim [Z53.1]  Not Applicable    UTI                  SBO (small bowel obstruction) [K56.609] s/p exploratory laparotomy, adhesiolysis with extensive adhesions with release of SBO.  Yes    Non-intractable vomiting with nausea [R11.2]  Yes    Malnutrition of moderate degree [E44.0]  Yes    Hepatitis C [B19.20]  Yes    CAD (coronary artery disease) [I25.10]  Yes    History of hypertension [Z86.79]  Not Applicable    Paraplegia [G82.20]  Yes        Discharged Condition:     Hospital Course:   Patient was a 89 y.o. male admitted to Hospitalist Service from [facility] Emergency Room with complaint of vomiting for 2 days and dysuria. Patient reportedly has past medical history significant for Chronic paraplegia due to C4 cervical spinal injury since  and chronic hepatitis C infection. Part of the history obtained from patient's wife. Patient has been taking PO Keflex for UTI diagnosed 4 days ago. Patient presented to the ED with vomiting, constipation,frequent urination, decreased urination, decreased appetite with an onset x 1 day PTA. The patient reported that he was recently discharged from the hospital x 5 days ago for a UTI. Patient reported that he had been taking his antibiotics (500mg Keflex)  as directed, but believed they were making him feel badly. He stated that he began vomiting. He denied nausea at this time. He stated that the vomit  was clear. Patient stated that he had not eaten in two days. He claimed that he had not had a bowel movement in 2-3 days. Patient denied cough, swelling,hematuria, hematemesis, bright red blood in stool, or any other symptoms at this time. Family reported that they are concerned that the patient may have diabetes. They reported that the patient has not seen his PCP since 2017. No bed sores reported. Patient denied chest pain, shortness of breath, headache, vision changes, focal neuro-deficits, cough or fever. Patient was admitted to Hospitalist medicine service. Patient was evaluated by Dr. Peña. Patient was initially treated conservatively for SBO. Needed NGT with LIS. Despite measure taken, symptoms did not improve. Patient underwent s/p exploratory laparotomy, adhesiolysis with extensive adhesions with release of SBO. Subsequently, patient developed respiratory distress, transferred to ICU. Possibly thought to have aspiration pneumonitis and multi-focal pneumonia. Patient required ventilatory support. Dr. Lucas closely followed the patient. Patient required use of IV TPN and later NGT feeding. Patient was unable to get of ventilatory support. Patient's wife declined Tracheostomy tube placement according to patient's wishes and made patient DNR code status. Patient was a Restorationist and did not wish blood products. Patient extubated at family request and provided comfort care. Patient  and pronounced dead by Dr. Rodarte at 2:30 PM.    Consults: Dr. Peña, Dr. Lucas    Significant Diagnostic Studies:   CT abdomen and pelvis with contrast: Probable adynamic ileus with all the small bowel loops containing fluid but without marked distention seen.  The colon contains stool but is not distended.  Severe prostate hypertrophy.  Bilateral small pleural effusions right greater than left and trace ascites around the liver.     KUB: Appropriately positioned nasogastric tube.  Improved bowel gas pattern with no  distinct loops of dilated small bowel evident on this exam.     KUB: NG tube removed and interval increase in the air-filled loops of bowel suggesting ileus.     CXR: The ET tube tip projects over the mid trachea. 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     CXR: Interval increase in alveolar opacity in the left lung consistent with atelectasis given the rapid interval change.  Overall, there has otherwise been an interval improvement in aeration of each lung noting that persistent severe pulmonary disease persists.  Differential considerations include pneumonia, pulmonary edema and ARDS.     LUE venous doppler: No evidence for left upper extremity venous thrombosis.     CT abdomen and pelvis: No evidence for intra-abdominal postoperative complication.  Lung findings compatible with multifocal pneumonia.  Small bilateral pleural effusions. Prostatomegaly, with evidence for chronic bladder outlet obstruction.  A Gonzales catheter is present within the urinary bladder however the urinary bladder remains moderately distended.  New minimal bilateral hydronephrosis is nonspecific however may be attributable to bladder outlet obstruction. Anasarca.     CXR: Appropriate position of support devices. Diffuse bilateral interstitial and airspace infiltrates may represent pneumonia, edema, or ARDS.  No significant change.     CTA chest: No evidence for pulmonary thromboembolic disease. Multifocal pneumonia. Moderate bilateral pleural effusions with compressive lower lobe atelectasis. 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     CXR: Stable radiographic appearance to the chest.  Findings most consistent with multilobar pneumonia, most pronounced in the left lung base.     KUB: Nonobstructive bowel gas pattern with appropriately positioned NG tube.     CXR: Multifocal pneumonia, left greater than  right, and mild worsening on the left. Stable positioning of support devices.  No pneumothorax.     CXR: Large left pleural effusion increased since prior studies with severe atelectasis of the left lung.  Cardiomegaly.  Tubes in good position.    Microbiology Results (last 7 days)     Procedure Component Value Units Date/Time    Blood culture [[id number]] Collected:  18 1440    Order Status:  Completed Specimen:  Blood from Line, Arterial Updated:  18 06     Blood Culture, Routine No growth after 5 days.    Blood culture [[id number]] Collected:  18 1440    Order Status:  Completed Specimen:  Blood from Antecubital, Left Updated:  18 0612     Blood Culture, Routine No growth after 5 days.    Culture, Respiratory with Gram Stain [[id number]] Collected:  18 1809    Order Status:  Completed Specimen:  Respiratory from Sputum, Expectorated Updated:  18 1137     Respiratory Culture Normal respiratory jaleel     Gram Stain (Respiratory) <10 epithelial cells per low power field.     Gram Stain (Respiratory) Rare WBC's     Gram Stain (Respiratory) No organisms seen    Clostridium difficile EIA [[id number]]     Order Status:  Canceled Specimen:  Stool from Stool         Special Treatments/Procedures: as above  Disposition:

## 2020-12-06 NOTE — PLAN OF CARE
Problem: Patient Care Overview  Goal: Plan of Care Review  Unable to discuss goals with patient due to level of consciousness.    Comments: Safety maintained, no pain assessed this shift, remains sedated and intact to ventilator with settings per respiratory therapy. Bath completed , tolerated well   the EKG is unchanged from prior EKG

## 2023-06-14 NOTE — ED NOTES
[facility], Miguel, called; will be another 30-35 minutes for patient transport.   No care due was identified.  [facility] Embedded Care Due Messages. Reference number: [id number].   6/14/2023 5:18:54 PM CDT
